# Patient Record
Sex: FEMALE | Race: WHITE
[De-identification: names, ages, dates, MRNs, and addresses within clinical notes are randomized per-mention and may not be internally consistent; named-entity substitution may affect disease eponyms.]

---

## 2017-11-15 ENCOUNTER — HOSPITAL ENCOUNTER (EMERGENCY)
Dept: HOSPITAL 92 - ERS | Age: 72
Discharge: HOME | End: 2017-11-15
Payer: MEDICARE

## 2017-11-15 DIAGNOSIS — E78.5: ICD-10-CM

## 2017-11-15 DIAGNOSIS — I10: ICD-10-CM

## 2017-11-15 DIAGNOSIS — N39.0: ICD-10-CM

## 2017-11-15 DIAGNOSIS — Z79.82: ICD-10-CM

## 2017-11-15 DIAGNOSIS — K21.9: ICD-10-CM

## 2017-11-15 DIAGNOSIS — K59.00: Primary | ICD-10-CM

## 2017-11-15 DIAGNOSIS — Z79.899: ICD-10-CM

## 2017-11-15 LAB
ALP SERPL-CCNC: 126 U/L (ref 40–150)
ALT SERPL W P-5'-P-CCNC: 10 U/L (ref 8–55)
ANION GAP SERPL CALC-SCNC: 9 MMOL/L (ref 10–20)
AST SERPL-CCNC: 14 U/L (ref 5–34)
BACTERIA UR QL AUTO: (no result) HPF
BASOPHILS # BLD AUTO: 0.1 THOU/UL (ref 0–0.2)
BASOPHILS NFR BLD AUTO: 0.7 % (ref 0–1)
BILIRUB SERPL-MCNC: 0.2 MG/DL (ref 0.2–1.2)
BUN SERPL-MCNC: 15 MG/DL (ref 9.8–20.1)
CALCIUM SERPL-MCNC: 8.6 MG/DL (ref 7.8–10.44)
CHLORIDE SERPL-SCNC: 104 MMOL/L (ref 98–107)
CO2 SERPL-SCNC: 30 MMOL/L (ref 23–31)
CREAT CL PREDICTED SERPL C-G-VRATE: 0 ML/MIN (ref 70–130)
EOSINOPHIL # BLD AUTO: 0.1 THOU/UL (ref 0–0.7)
EOSINOPHIL NFR BLD AUTO: 1 % (ref 0–10)
GLOBULIN SER CALC-MCNC: 4 G/DL (ref 2.4–3.5)
HCT VFR BLD CALC: 26.4 % (ref 36–47)
HYALINE CASTS #/AREA URNS LPF: (no result) LPF
LIPASE SERPL-CCNC: 15 U/L (ref 8–78)
LYMPHOCYTES # BLD: 2.3 THOU/UL (ref 1.2–3.4)
LYMPHOCYTES NFR BLD AUTO: 25.3 % (ref 21–51)
MICROCYTES BLD QL SMEAR: (no result) (100X)
MONOCYTES # BLD AUTO: 0.9 THOU/UL (ref 0.11–0.59)
MONOCYTES NFR BLD AUTO: 9.5 % (ref 0–10)
NEUTROPHILS # BLD AUTO: 5.7 THOU/UL (ref 1.4–6.5)
OVALOCYTES BLD QL SMEAR: (no result) (100X)
POLYCHROMASIA BLD QL SMEAR: (no result) (100X)
RBC # BLD AUTO: 3.86 MILL/UL (ref 4.2–5.4)
TROPONIN I SERPL DL<=0.01 NG/ML-MCNC: (no result) NG/ML (ref ?–0.03)
WBC # BLD AUTO: 9 THOU/UL (ref 4.8–10.8)

## 2017-11-15 PROCEDURE — 84484 ASSAY OF TROPONIN QUANT: CPT

## 2017-11-15 PROCEDURE — 87186 SC STD MICRODIL/AGAR DIL: CPT

## 2017-11-15 PROCEDURE — 87077 CULTURE AEROBIC IDENTIFY: CPT

## 2017-11-15 PROCEDURE — 74177 CT ABD & PELVIS W/CONTRAST: CPT

## 2017-11-15 PROCEDURE — 81001 URINALYSIS AUTO W/SCOPE: CPT

## 2017-11-15 PROCEDURE — 83605 ASSAY OF LACTIC ACID: CPT

## 2017-11-15 PROCEDURE — 51701 INSERT BLADDER CATHETER: CPT

## 2017-11-15 PROCEDURE — 82553 CREATINE MB FRACTION: CPT

## 2017-11-15 PROCEDURE — 85025 COMPLETE CBC W/AUTO DIFF WBC: CPT

## 2017-11-15 PROCEDURE — 87086 URINE CULTURE/COLONY COUNT: CPT

## 2017-11-15 PROCEDURE — 83690 ASSAY OF LIPASE: CPT

## 2017-11-15 PROCEDURE — 80053 COMPREHEN METABOLIC PANEL: CPT

## 2017-11-15 PROCEDURE — A4353 INTERMITTENT URINARY CATH: HCPCS

## 2017-11-15 PROCEDURE — 93005 ELECTROCARDIOGRAM TRACING: CPT

## 2017-11-15 NOTE — CT
CT ABDOMEN AND PELVIS WITH CONTRAST:

 

HISTORY: 

Abdominal pain.  Diarrhea.  History of bowel obstruction 6 months ago.

 

COMPARISON: 

CT abdomen and pelvis 4/19/17.

 

FINDINGS: 

There are some atelectatic changes in the lung bases.  No pericardial effusion.

 

Cholelithiasis is present.  There is extensive stool throughout the colon.  The rectum is enlarged me
asuring nearly 10 cm in size.  The wall is very thin with some areas that do have concern for pneumat
osis.  Moderate diverticular disease of the sigmoid colon without active inflammation.

 

IVC filter is in place.  No free intraperitoneal gas or fluid.  Hypodensity in the liver segment 4A a
nd B is similar as well as hepatic segment 2.

 

Portal vein is patent.  There is abnormal urothelial thickening of the right renal pelvis with a calc
ulus in the regional pelvis measuring less than 2 mm.  Bones are osteopenic.  Atrophy of the right gl
uteus musculature.  Multiple superior end plate deformities throughout the lumbar spine.

 

There is some ossification along the bilateral ischiofemoral space with a suggestion of ischial femor
al impingement with atrophy of the quadratus femoris muscle.  This is bilateral.

 

There is a healing posterior right 11th, 10th, rib fracture.  No acute fracture is appreciated.

 

IMPRESSION: 

1.  Very similar examination to 4/19/17.  There is severe distention of the rectal vault to approxima
tely 10 cm with some thinning of the wall and possible areas of pneumatosis, although there is no nishi
e intraperitoneal gas or fluid.  Disimpaction may be necessary.

2.  Abnormal thickening of the urothelium of the right renal pelvis with a punctate interpolar calcul
us.  Followup CT urogram may be beneficial to exclude a urothelial-based neoplasm.  Urinalysis correl
ation is also recommended.  This can be sequelae of pyelitis and chronic inflammation from calculi.

 

POS: [place]

## 2018-01-04 ENCOUNTER — HOSPITAL ENCOUNTER (INPATIENT)
Dept: HOSPITAL 92 - ERS | Age: 73
LOS: 5 days | Discharge: HOME | DRG: 394 | End: 2018-01-09
Attending: INTERNAL MEDICINE | Admitting: INTERNAL MEDICINE
Payer: MEDICARE

## 2018-01-04 VITALS — BODY MASS INDEX: 34.8 KG/M2

## 2018-01-04 DIAGNOSIS — K57.30: ICD-10-CM

## 2018-01-04 DIAGNOSIS — E78.5: ICD-10-CM

## 2018-01-04 DIAGNOSIS — Z79.82: ICD-10-CM

## 2018-01-04 DIAGNOSIS — E87.6: ICD-10-CM

## 2018-01-04 DIAGNOSIS — E66.9: ICD-10-CM

## 2018-01-04 DIAGNOSIS — Z74.01: ICD-10-CM

## 2018-01-04 DIAGNOSIS — Z87.820: ICD-10-CM

## 2018-01-04 DIAGNOSIS — I25.10: ICD-10-CM

## 2018-01-04 DIAGNOSIS — D50.9: ICD-10-CM

## 2018-01-04 DIAGNOSIS — K55.20: Primary | ICD-10-CM

## 2018-01-04 DIAGNOSIS — K56.41: ICD-10-CM

## 2018-01-04 DIAGNOSIS — N30.00: ICD-10-CM

## 2018-01-04 DIAGNOSIS — Z66: ICD-10-CM

## 2018-01-04 DIAGNOSIS — I10: ICD-10-CM

## 2018-01-04 LAB
ACANTHOCYTES BLD QL SMEAR: (no result) (100X)
ALBUMIN SERPL BCG-MCNC: 3.5 G/DL (ref 3.4–4.8)
ALP SERPL-CCNC: 120 U/L (ref 40–150)
ALT SERPL W P-5'-P-CCNC: 9 U/L (ref 8–55)
ANION GAP SERPL CALC-SCNC: 13 MMOL/L (ref 10–20)
ANISOCYTOSIS BLD QL SMEAR: (no result) (100X)
AST SERPL-CCNC: 15 U/L (ref 5–34)
BASOPHILS # BLD AUTO: 0.1 THOU/UL (ref 0–0.2)
BASOPHILS NFR BLD AUTO: 0.8 % (ref 0–1)
BILIRUB SERPL-MCNC: 0.3 MG/DL (ref 0.2–1.2)
BUN SERPL-MCNC: 11 MG/DL (ref 9.8–20.1)
CALCIUM SERPL-MCNC: 8.9 MG/DL (ref 7.8–10.44)
CHLORIDE SERPL-SCNC: 104 MMOL/L (ref 98–107)
CO2 SERPL-SCNC: 28 MMOL/L (ref 23–31)
CREAT CL PREDICTED SERPL C-G-VRATE: 0 ML/MIN (ref 70–130)
EOSINOPHIL # BLD AUTO: 0.1 THOU/UL (ref 0–0.7)
EOSINOPHIL NFR BLD AUTO: 0.8 % (ref 0–10)
GLOBULIN SER CALC-MCNC: 3.8 G/DL (ref 2.4–3.5)
GLUCOSE SERPL-MCNC: 105 MG/DL (ref 83–110)
HGB BLD-MCNC: 6.7 G/DL (ref 12–16)
LYMPHOCYTES NFR BLD AUTO: 20 % (ref 21–51)
MCH RBC QN AUTO: 18 PG (ref 27–31)
MCV RBC AUTO: 63.4 FL (ref 81–99)
MDIFF COMPLETE?: YES
MICROCYTES BLD QL SMEAR: (no result) (100X)
MONOCYTES # BLD AUTO: 0.8 THOU/UL (ref 0.11–0.59)
MONOCYTES NFR BLD AUTO: 9.2 % (ref 0–10)
NEUTROPHILS # BLD AUTO: 5.7 THOU/UL (ref 1.4–6.5)
NEUTROPHILS NFR BLD AUTO: 69.2 % (ref 42–75)
OVALOCYTES BLD QL SMEAR: (no result) (100X)
PLATELET # BLD AUTO: 429 THOU/UL (ref 130–400)
PLATELET BLD QL SMEAR: (no result)
POTASSIUM SERPL-SCNC: 3.7 MMOL/L (ref 3.5–5.1)
RBC # BLD AUTO: 3.69 MILL/UL (ref 4.2–5.4)
SODIUM SERPL-SCNC: 141 MMOL/L (ref 136–145)
WBC # BLD AUTO: 8.2 THOU/UL (ref 4.8–10.8)

## 2018-01-04 PROCEDURE — 96374 THER/PROPH/DIAG INJ IV PUSH: CPT

## 2018-01-04 PROCEDURE — 85060 BLOOD SMEAR INTERPRETATION: CPT

## 2018-01-04 PROCEDURE — 82274 ASSAY TEST FOR BLOOD FECAL: CPT

## 2018-01-04 PROCEDURE — 74177 CT ABD & PELVIS W/CONTRAST: CPT

## 2018-01-04 PROCEDURE — S0028 INJECTION, FAMOTIDINE, 20 MG: HCPCS

## 2018-01-04 PROCEDURE — 36430 TRANSFUSION BLD/BLD COMPNT: CPT

## 2018-01-04 PROCEDURE — 36415 COLL VENOUS BLD VENIPUNCTURE: CPT

## 2018-01-04 PROCEDURE — 96361 HYDRATE IV INFUSION ADD-ON: CPT

## 2018-01-04 PROCEDURE — 80053 COMPREHEN METABOLIC PANEL: CPT

## 2018-01-04 PROCEDURE — P9016 RBC LEUKOCYTES REDUCED: HCPCS

## 2018-01-04 PROCEDURE — 86850 RBC ANTIBODY SCREEN: CPT

## 2018-01-04 PROCEDURE — 86901 BLOOD TYPING SEROLOGIC RH(D): CPT

## 2018-01-04 PROCEDURE — 86900 BLOOD TYPING SEROLOGIC ABO: CPT

## 2018-01-04 PROCEDURE — 85025 COMPLETE CBC W/AUTO DIFF WBC: CPT

## 2018-01-04 PROCEDURE — 80048 BASIC METABOLIC PNL TOTAL CA: CPT

## 2018-01-04 PROCEDURE — A4216 STERILE WATER/SALINE, 10 ML: HCPCS

## 2018-01-04 PROCEDURE — 74022 RADEX COMPL AQT ABD SERIES: CPT

## 2018-01-04 NOTE — CT
EXAM:

ABDOMEN CT WITH CONTRAST

PELVIC CT WITH CONTRAST

1/4/18

 

HISTORY: 

Abdominal pain. Abdominal distention. Possible constipation. 

 

COMPARISON:  

11/15/17

 

TECHNIQUE:  

An abdomen and pelvic CT are performed with IV contrast. Enteric contrast was not administered. Coron
al reformatted images are submitted for interpretation. 

 

FINDINGS:  

 

ABDOMEN CT:

Chronic changes in the lung bases. Heart size is within normal limits. No pericardial effusion. The d
escending thoracic aorta and abdominal aorta have a normal caliber. No periaortic fat stranding. Intr
a and extrahepatic portal vein is patent. 

 

CT evidence of cholelithiasis. Findings are equivocal cholecystitis. Gallbladder ultrasound if clinic
ally warranted. Visualized common bile duct is unremarkable. 

 

Stable hypodensity in the liver, compatible with a cyst. Hepatic parenchyma is otherwise unremarkable
. Possible small cyst in the hepatic dome is also noted. 

 

Spleen, pancreas and adrenal glands have stable enhancement. Mild pancreatic atrophy.

 

Symmetric enhancement of the kidneys. Bilaterally, no obstructive uropathy. Symmetric attenuation of 
the psoas muscles.

 

Note is made of an IVC filter.  No gastrohepatic, retrocrural or periportal lymphadenopathy.

 

No mesenteric mass, lymphadenopathy, free air or free fluid. 

 

Limited evaluation of the alimentary canal due to the lack of oral contrast administration and due to
 motion degradation. Grossly, the gastric mucosa and duodenum are unremarkable. Multiple normal calib
er small bowel loops. The expected region of the ileocecal junction is unremarkable. Cecal apex appea
rs to be unremarkable for inflammatory change. The visualized appendix is unremarkable. There is mini
mal fecal material in the colon. The majority of the colon is opacified with fluid attenuation. Mild 
colonic distention. There is fluid attenuation involving the sigmoid colon and rectum. There is air i
n the nondependent portion of the distal sigmoid colon and rectum. Small amount of air is noted on th
e left lateral wall of the rectum. Significant of this air is uncertain. Possibility of pneumatosis c
annot be completely excluded given thinning of the rectal vault and mild distention. The rectal vault
 measures 10.9 x 10.0 cm. 

 

PELVIC CT:

Urinary bladder is unremarkable. No pelvic mass, lymphadenopathy, free air or free fluid. 

 

No lytic or blastic lesion in the osseous structures. 

 

IMPRESSION:  

1.      No evidence of constipation. The majority of the colon has a fluid attenuation. 

2.      There is distention of the distal sigmoid colon and rectum with thinning of the rectal wall. 
A small foci of air noted along the left lateral aspect. Possibility of pneumatosis cannot be evaluat
ion. No evidence if free air in the pelvis or abdomen. 

3.      Normal caliber appendix. 

 

 

POS: [facility]

## 2018-01-04 NOTE — RAD
EXAM:

ONE VIEW CHEST

ABDOMEN TWO VIEWS

1/4/18

 

HISTORY: 

Evaluate for bowel obstruction. Constipation and abdominal distention. 

 

COMPARISON:  

One view abdomen 4/21/17.

 

FINDINGS:  

Chest one view. 

 

Slight elongation of the aorta. Normal cardiac silhouette. Pulmonary vessels and hilum are normal. Co
stophrenic angles are clear. Linear opacities in the left lung base likely represent atelectasis. No 
consolidation or mass. No pneumothorax. No osseous abnormalities.

 

ABDOMEN TWO VIEWS: 

An abdomen radiograph demonstrates multiple nondistended air filled loops of small bowel. There is ai
r attenuation throughout the entire colon without definite colonic distention. Air attenuation is not
ed down to the level of the rectum. Minimal fecal material is present. IVC filter is redemonstrated. 


 

IMPRESSION:  

1.      No acute cardiopulmonary process.

2.      Scarring atelectasis in the left lung base. 

3.      Air attenuation throughout a nondistended, nondilated colon. No evidence of constipation. 

4.      Left lateral decubitus views are suboptimal to assess for pneumoperitoneum. An additional lef
t lateral decubitus  view has been requested. Report will be addended when that becomes available. 

 

 

 

POS: [facility]

## 2018-01-05 LAB
ACANTHOCYTES BLD QL SMEAR: (no result) (100X)
ANISOCYTOSIS BLD QL SMEAR: (no result) (100X)
BASOPHILS # BLD AUTO: 0.1 THOU/UL (ref 0–0.2)
BASOPHILS NFR BLD AUTO: 0.5 % (ref 0–1)
EOSINOPHIL # BLD AUTO: 0 THOU/UL (ref 0–0.7)
EOSINOPHIL NFR BLD AUTO: 0.4 % (ref 0–10)
HGB BLD-MCNC: 6.5 G/DL (ref 12–16)
LYMPHOCYTES # BLD: 2 THOU/UL (ref 1.2–3.4)
LYMPHOCYTES NFR BLD AUTO: 19.4 % (ref 21–51)
MCH RBC QN AUTO: 18.1 PG (ref 27–31)
MCV RBC AUTO: 63.5 FL (ref 81–99)
MDIFF COMPLETE?: YES
MONOCYTES # BLD AUTO: 1.1 THOU/UL (ref 0.11–0.59)
MONOCYTES NFR BLD AUTO: 10.2 % (ref 0–10)
NEUTROPHILS # BLD AUTO: 7.2 THOU/UL (ref 1.4–6.5)
NEUTROPHILS NFR BLD AUTO: 69.4 % (ref 42–75)
PLATELET # BLD AUTO: 435 THOU/UL (ref 130–400)
PLATELET BLD QL SMEAR: (no result)
RBC # BLD AUTO: 3.58 MILL/UL (ref 4.2–5.4)
TARGETS BLD QL SMEAR: (no result) (100X)
WBC # BLD AUTO: 10.4 THOU/UL (ref 4.8–10.8)

## 2018-01-05 RX ADMIN — FAMOTIDINE SCH MG: 10 INJECTION, SOLUTION INTRAVENOUS at 21:45

## 2018-01-05 RX ADMIN — FAMOTIDINE SCH: 10 INJECTION, SOLUTION INTRAVENOUS at 17:21
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
## 2018-01-06 LAB
ALBUMIN SERPL BCG-MCNC: 3 G/DL (ref 3.4–4.8)
ALP SERPL-CCNC: 109 U/L (ref 40–150)
ALT SERPL W P-5'-P-CCNC: 8 U/L (ref 8–55)
ANION GAP SERPL CALC-SCNC: 17 MMOL/L (ref 10–20)
AST SERPL-CCNC: 16 U/L (ref 5–34)
BASOPHILS # BLD AUTO: 0 THOU/UL (ref 0–0.2)
BASOPHILS NFR BLD AUTO: 0.4 % (ref 0–1)
BILIRUB SERPL-MCNC: 0.6 MG/DL (ref 0.2–1.2)
BUN SERPL-MCNC: 6 MG/DL (ref 9.8–20.1)
CALCIUM SERPL-MCNC: 8.3 MG/DL (ref 7.8–10.44)
CHLORIDE SERPL-SCNC: 107 MMOL/L (ref 98–107)
CO2 SERPL-SCNC: 24 MMOL/L (ref 23–31)
CREAT CL PREDICTED SERPL C-G-VRATE: 112 ML/MIN (ref 70–130)
EOSINOPHIL # BLD AUTO: 0.2 THOU/UL (ref 0–0.7)
EOSINOPHIL NFR BLD AUTO: 2.9 % (ref 0–10)
GLOBULIN SER CALC-MCNC: 3.4 G/DL (ref 2.4–3.5)
GLUCOSE SERPL-MCNC: 127 MG/DL (ref 83–110)
HGB BLD-MCNC: 7.6 G/DL (ref 12–16)
LYMPHOCYTES # BLD: 2.2 THOU/UL (ref 1.2–3.4)
LYMPHOCYTES NFR BLD AUTO: 26.9 % (ref 21–51)
MCH RBC QN AUTO: 19.8 PG (ref 27–31)
MCV RBC AUTO: 66.5 FL (ref 81–99)
MDIFF COMPLETE?: YES
MICROCYTES BLD QL SMEAR: (no result) (100X)
MONOCYTES # BLD AUTO: 1 THOU/UL (ref 0.11–0.59)
MONOCYTES NFR BLD AUTO: 11.8 % (ref 0–10)
NEUTROPHILS # BLD AUTO: 4.8 THOU/UL (ref 1.4–6.5)
NEUTROPHILS NFR BLD AUTO: 58.1 % (ref 42–75)
PLATELET # BLD AUTO: 415 THOU/UL (ref 130–400)
PLATELET BLD QL SMEAR: (no result)
POTASSIUM SERPL-SCNC: 3.6 MMOL/L (ref 3.5–5.1)
RBC # BLD AUTO: 3.83 MILL/UL (ref 4.2–5.4)
SODIUM SERPL-SCNC: 144 MMOL/L (ref 136–145)
WBC # BLD AUTO: 8.3 THOU/UL (ref 4.8–10.8)

## 2018-01-06 RX ADMIN — FAMOTIDINE SCH: 10 INJECTION, SOLUTION INTRAVENOUS at 08:20

## 2018-01-06 RX ADMIN — FAMOTIDINE SCH: 10 INJECTION, SOLUTION INTRAVENOUS at 20:11

## 2018-01-06 NOTE — OP
PREOPERATIVE DIAGNOSES:

1.  Iron deficiency anemia.

2.  Chronic constipation.

3.  Abnormal CT scan.

 

PROCEDURE IN DETAIL:  After informed consent was obtained, the patient was placed in the left lateral
 decubitus position.  Anesthesia was administered per the Anesthesia Department.  Forward-viewing end
oscope was inserted into the esophagus under direct visualization with ease and passed to the second 
portion of the duodenum with ease.  In the second portion of the duodenum, there was an AVM.  This AV
M was ablated with a 7 Setswana BICAP electrocautery probe at a setting of 20 ly 2 seconds.  Good he
mostasis was achieved.  The remainder of the duodenum was normal.  The duodenal bulb, pylorus, antrum
, body, fundus, and cardia were all normal.  Retroflexion in the stomach was normal.  Esophagus was n
ormal throughout.

 

ASSESSMENT:

1.  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

2.  Otherwise normal esophagogastroduodenoscopy.

 

RECOMMENDATIONS:

1.  Proton pump inhibitor x2 weeks.

2.  Proceed with colonoscopy.

 

DESCRIPTION OF PROCEDURE:  After informed consent was obtained, the patient was placed in the left la
teral decubitus position.  Anesthesia was administered per the Anesthesia Department.  Forward-viewin
g endoscope was inserted into the rectum after perianal inspection and rectal exam were normal.  It w
as passed to the cecum with much difficulty secondary to looping.  The cecum, ileocecal valve, and ap
pendiceal orifice were normal.  The prep was good.  In the ascending, 2 lipomas were noted.  Diffuse 
diverticula noted as well.  The remainder of the ascending, transverse, descending, sigmoid and rectu
m were normal except for some small slightly dilated colon.

 

ASSESSMENT:

1.  Diffusely dilated colon.

2.  Diffuse diverticulosis coli.

3.  Two ascending colon lipomas.

4.  Otherwise normal colonoscopy.

 

RECOMMENDATION:

1.  Dulcolax suppositories daily with digital examination.

2.  Stable from GI standpoint for discharge.

3.  Iron replacement therapy.

## 2018-01-06 NOTE — PDOC.PN
- Subjective


Encounter Start Date: 01/06/18


Encounter Start Time: 11:27





Patient seen and examined, family at bedside, all questions answered.





- Objective


Resuscitation Status: 


 











Resuscitation Status           FULL:Full Resuscitation














Vital Signs & Weight: 


 Vital Signs (12 hours)











  Temp Pulse Pulse Resp BP BP Pulse Ox


 


 01/06/18 10:58  97.4 F L  77   18   138/81  95


 


 01/06/18 08:00  97.3 F L  77   18   


 


 01/06/18 04:00  97.3 F L  77   18   176/79 H  94 L


 


 01/06/18 00:20  97.5 F L   78  18  164/77 H   93 L


 


 01/06/18 00:00  97.4 F L  93   20   144/65 H  94 L








 Weight











Weight                         215 lb 11.2 oz














I&O: 


 











 01/05/18 01/06/18 01/07/18





 06:59 06:59 06:59


 


Intake Total  4862 


 


Output Total  400 


 


Balance  4462 











Result Diagrams: 


 01/06/18 05:28





 01/06/18 04:34





Phys Exam





- Physical Examination


Constitutional: NAD


HEENT: PERRLA, moist MMs


Neck: no nodes, no JVD


Respiratory: no wheezing, no rales


Cardiovascular: RRR, no significant murmur


Gastrointestinal: soft, non-tender, no distention


Musculoskeletal: no edema, pulses present


Neurological: non-focal, normal sensation





Dx/Plan


(1) Anemia


Code(s): D64.9 - ANEMIA, UNSPECIFIED   Status: Acute   





(2) GI bleed


Code(s): K92.2 - GASTROINTESTINAL HEMORRHAGE, UNSPECIFIED   Status: Acute   





(3) Fecal impaction


Code(s): K56.41 - FECAL IMPACTION   Status: Acute   





(4) HTN (hypertension)


Code(s): I10 - ESSENTIAL (PRIMARY) HYPERTENSION   Status: Chronic   


Qualifiers: 


 





(5) Obesity (BMI 30.0-34.9)


Code(s): E66.9 - OBESITY, UNSPECIFIED   Status: Chronic   





- Plan





* Endoscopy for today


* will monitor Hgb over the next 24 hours


* DC plans in AM if Hgb stable and ok from GI perspective


* continue all other medical management for now without change


* case and plan d/w patient's family at length, they understand and agree with 

this plan

## 2018-01-07 LAB
BASOPHILS # BLD AUTO: 0 THOU/UL (ref 0–0.2)
BASOPHILS NFR BLD AUTO: 0.3 % (ref 0–1)
EOSINOPHIL # BLD AUTO: 0.2 THOU/UL (ref 0–0.7)
EOSINOPHIL NFR BLD AUTO: 3.1 % (ref 0–10)
HGB BLD-MCNC: 6.9 G/DL (ref 12–16)
LYMPHOCYTES # BLD: 2.4 THOU/UL (ref 1.2–3.4)
LYMPHOCYTES NFR BLD AUTO: 29.8 % (ref 21–51)
MCH RBC QN AUTO: 19.6 PG (ref 27–31)
MCV RBC AUTO: 67.6 FL (ref 81–99)
MONOCYTES # BLD AUTO: 0.7 THOU/UL (ref 0.11–0.59)
MONOCYTES NFR BLD AUTO: 9 % (ref 0–10)
NEUTROPHILS # BLD AUTO: 4.6 THOU/UL (ref 1.4–6.5)
NEUTROPHILS NFR BLD AUTO: 57.8 % (ref 42–75)
PLATELET # BLD AUTO: 414 THOU/UL (ref 130–400)
RBC # BLD AUTO: 3.54 MILL/UL (ref 4.2–5.4)
WBC # BLD AUTO: 8 THOU/UL (ref 4.8–10.8)

## 2018-01-07 NOTE — PRG
DATE OF SERVICE:  01/07/2018

 

SUBJECTIVE:  The patient is without complaints.  She denies any abdominal pain.  She is eating well, 
ate 100% of her breakfast.

 

OBJECTIVE:

VITAL SIGNS:  Temperature 98.6, pulse 69, respiratory rate 18, blood pressure 126/74.

CHEST:  Clear.

CARDIOVASCULAR:  Regular rate and rhythm.

ABDOMEN:  Distended, nontender, tympanitic diffusely.

 

LABORATORY DATA:  Shows hemoglobin 6.9, hematocrit 23.9, white blood cell count of 8.0.  Chemistries 
show a BUN of 6, glucose 127.  Albumin 3.0.

 

ASSESSMENT:

1.  Duodenal arteriovenous malformation.

2.  Chronic pseudo-obstruction - fairly asymptomatic at this time.

3.  Anemia.

 

RECOMMENDATIONS:

1.  Continue oral iron.

2.  Consider transfusion.

3.  Continue proton-pump inhibitor.

4.  Daily Dulcolax with digital stimulation.
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
## 2018-01-07 NOTE — PDOC.PN
- Subjective


Encounter Start Date: 01/07/18


Encounter Start Time: 12:16





Patient seen and examined, no new issues, all questions answered. No family at 

bedside. 





- Objective


Resuscitation Status: 


 











Resuscitation Status           FULL:Full Resuscitation














Vital Signs & Weight: 


 Vital Signs (12 hours)











  Temp Pulse Resp BP Pulse Ox


 


 01/07/18 08:00  98.1 F  77  20  141/87 H  94 L


 


 01/07/18 04:00  98.6 F  69  18  126/74  92 L








 Weight











Weight                         215 lb 11.2 oz














I&O: 


 











 01/06/18 01/07/18 01/08/18





 06:59 06:59 06:59


 


Intake Total 4862 240 180


 


Output Total 400  


 


Balance 4462 240 180











Result Diagrams: 


 01/07/18 04:40





 01/06/18 04:34





Phys Exam





- Physical Examination


Constitutional: NAD


HEENT: PERRLA, moist MMs


Neck: no nodes, no JVD


Respiratory: no wheezing, no rales, no rhonchi


Cardiovascular: RRR, no significant murmur


Gastrointestinal: soft, non-tender, no distention


Musculoskeletal: no edema, pulses present


Neurological: non-focal, normal sensation





Dx/Plan


(1) Anemia


Code(s): D64.9 - ANEMIA, UNSPECIFIED   Status: Acute   





(2) GI bleed


Code(s): K92.2 - GASTROINTESTINAL HEMORRHAGE, UNSPECIFIED   Status: Acute   





(3) Fecal impaction


Code(s): K56.41 - FECAL IMPACTION   Status: Acute   





(4) HTN (hypertension)


Code(s): I10 - ESSENTIAL (PRIMARY) HYPERTENSION   Status: Chronic   


Qualifiers: 


 





(5) Obesity (BMI 30.0-34.9)


Code(s): E66.9 - OBESITY, UNSPECIFIED   Status: Chronic   





- Plan





* Hgb 6.9, will transfuse 1 unit of blood


* venofer 300mg IV x 1 dose as well


* repeat Hgb in AM


* DC plans in AM if Hgb stable, will need po iron as well at time of discharge


* case and plan d/w patient at length, she understands and agrees with this plan

## 2018-01-08 LAB
ALBUMIN SERPL BCG-MCNC: 2.8 G/DL (ref 3.4–4.8)
ALP SERPL-CCNC: 81 U/L (ref 40–150)
ALT SERPL W P-5'-P-CCNC: 9 U/L (ref 8–55)
ANION GAP SERPL CALC-SCNC: 11 MMOL/L (ref 10–20)
AST SERPL-CCNC: 13 U/L (ref 5–34)
BASOPHILS # BLD AUTO: 0.1 THOU/UL (ref 0–0.2)
BASOPHILS NFR BLD AUTO: 0.8 % (ref 0–1)
BILIRUB SERPL-MCNC: 0.3 MG/DL (ref 0.2–1.2)
BUN SERPL-MCNC: 5 MG/DL (ref 9.8–20.1)
CALCIUM SERPL-MCNC: 8.1 MG/DL (ref 7.8–10.44)
CHLORIDE SERPL-SCNC: 106 MMOL/L (ref 98–107)
CO2 SERPL-SCNC: 30 MMOL/L (ref 23–31)
CREAT CL PREDICTED SERPL C-G-VRATE: 131 ML/MIN (ref 70–130)
EOSINOPHIL # BLD AUTO: 0.1 THOU/UL (ref 0–0.7)
EOSINOPHIL NFR BLD AUTO: 1.5 % (ref 0–10)
GLOBULIN SER CALC-MCNC: 2.8 G/DL (ref 2.4–3.5)
GLUCOSE SERPL-MCNC: 96 MG/DL (ref 83–110)
HGB BLD-MCNC: 6.7 G/DL (ref 12–16)
LYMPHOCYTES # BLD: 1.7 THOU/UL (ref 1.2–3.4)
LYMPHOCYTES NFR BLD AUTO: 23.8 % (ref 21–51)
MCH RBC QN AUTO: 19.6 PG (ref 27–31)
MCV RBC AUTO: 66.6 FL (ref 81–99)
MDIFF COMPLETE?: YES
MICROCYTES BLD QL SMEAR: (no result) (100X)
MONOCYTES # BLD AUTO: 0.7 THOU/UL (ref 0.11–0.59)
MONOCYTES NFR BLD AUTO: 10.1 % (ref 0–10)
NEUTROPHILS # BLD AUTO: 4.5 THOU/UL (ref 1.4–6.5)
NEUTROPHILS NFR BLD AUTO: 63.8 % (ref 42–75)
OVALOCYTES BLD QL SMEAR: (no result) (100X)
PLATELET # BLD AUTO: 397 THOU/UL (ref 130–400)
POLYCHROMASIA BLD QL SMEAR: (no result) (100X)
POTASSIUM SERPL-SCNC: 3 MMOL/L (ref 3.5–5.1)
RBC # BLD AUTO: 3.43 MILL/UL (ref 4.2–5.4)
SODIUM SERPL-SCNC: 144 MMOL/L (ref 136–145)
WBC # BLD AUTO: 7 THOU/UL (ref 4.8–10.8)

## 2018-01-08 PROCEDURE — 30233N1 TRANSFUSION OF NONAUTOLOGOUS RED BLOOD CELLS INTO PERIPHERAL VEIN, PERCUTANEOUS APPROACH: ICD-10-PCS | Performed by: INTERNAL MEDICINE

## 2018-01-08 NOTE — PRG
DATE OF SERVICE:  01/08/2018

 

SUBJECTIVE:  The patient is doing about the same.  No specific GI complaints at this time.

 

OBJECTIVE:

VITAL SIGNS:  Shows temperature 97.9, pulse 79, respiratory 16, blood pressure 112/69.

CHEST:  Clear.

CARDIOVASCULAR:  Regular rate and rhythm.

ABDOMEN:  Slightly distended, slightly tympanitic, but nontender and soft.

 

LABORATORY DATA:  Shows hemoglobin of 6.7, hematocrit 22.9.  Chemistries show potassium 3.0, albumin 
2.8.

 

ASSESSMENT:

1.  Anemia.

2.  Small bowel arteriovenous malformations.

3.  Hypokalemia.

 

RECOMMENDATIONS:

1.  Agree with transfusion.

2.  The patient has received iron infusion.

3.  Continue present treatment.

4.  We will sign off.

## 2018-01-08 NOTE — PDOC.PN
- Subjective


Encounter Start Date: 01/08/18


Encounter Start Time: 08:40


-: old records requested/rev





Patient seen and examined. No new complaints. No overnight events





- Objective


MAR Reviewed: Yes


Vital Signs & Weight: 


 Vital Signs (12 hours)











  Temp Pulse Resp BP Pulse Ox


 


 01/08/18 08:03  97.9 F  79  16  112/69  97


 


 01/08/18 07:39  98.2 F  80  18  











I&O: 


 











 01/07/18 01/08/18 01/09/18





 06:59 06:59 06:59


 


Intake Total   180


 


Balance   180











Result Diagrams: 


 01/08/18 08:41





 01/08/18 08:41





Phys Exam





- Physical Examination


Constitutional: NAD


HEENT: PERRLA, moist MMs, sclera anicteric


Neck: no JVD, supple


Respiratory: no wheezing, no rales, no rhonchi


Cardiovascular: RRR, no significant murmur, no rub


Gastrointestinal: soft, non-tender, no distention, positive bowel sounds


Musculoskeletal: no edema, pulses present


Neurological: non-focal, normal sensation


Lymphatic: no nodes


Psychiatric: normal affect


Skin: no rash, normal turgor





Dx/Plan


(1) Anemia


Code(s): D64.9 - ANEMIA, UNSPECIFIED   Status: Acute   





(2) GI bleed


Code(s): K92.2 - GASTROINTESTINAL HEMORRHAGE, UNSPECIFIED   Status: Acute   





(3) Fecal impaction


Code(s): K56.41 - FECAL IMPACTION   Status: Acute   





(4) UTI (urinary tract infection)


Status: Acute   


Qualifiers: 


   Urinary tract infection type: acute cystitis   Hematuria presence: without 

hematuria   Qualified Code(s): N30.00 - Acute cystitis without hematuria   





(5) Dyslipidemia


Code(s): E78.5 - HYPERLIPIDEMIA, UNSPECIFIED   Status: Chronic   





(6) H/O traumatic brain injury


Code(s): Z87.820 - PERSONAL HISTORY OF TRAUMATIC BRAIN INJURY   Status: Chronic

   





(7) HTN (hypertension)


Code(s): I10 - ESSENTIAL (PRIMARY) HYPERTENSION   Status: Chronic   


Qualifiers: 


 





(8) Obesity (BMI 30.0-34.9)


Code(s): E66.9 - OBESITY, UNSPECIFIED   Status: Chronic   





(9) Nausea & vomiting


Code(s): R11.2 - NAUSEA WITH VOMITING, UNSPECIFIED   Status: Resolved   


Qualifiers: 


   Vomiting type: unspecified 





(10) Hypokalemia


Code(s): E87.6 - HYPOKALEMIA   Status: Acute   





- Plan


cont current plan of care





* replace potassium


* transfuse 1 unit prbc


* medication reviewed as below


* symptomatic treatment


* repeat labs tomorrow.








Review of Systems





- Review of Systems


ENT: negative: Ear Pain, Ear Discharge, Nose Pain, Nose Discharge, Nose 

Congestion, Mouth Pain, Mouth Swelling, Throat Pain, Throat Swelling, Other


Respiratory: negative: Cough, Dry, Shortness of Breath, Hemoptysis, SOB with 

Excertion, Pleuritic Pain, Sputum, Wheezing


Cardiovascular: negative: chest pain, palpitations, orthopnea, paroxysmal 

nocturnal dyspnea, edema, light headedness, other


Gastrointestinal: negative: Nausea, Vomiting, Abdominal Pain, Diarrhea, 

Constipation, Melena, Hematochezia, Other


Genitourinary: negative: Dysuria, Frequency, Incontinence, Hematuria, Retention

, Other


Musculoskeletal: negative: Neck Pain, Shoulder Pain, Arm Pain, Back Pain, Hand 

Pain, Leg Pain, Foot Pain, Other


Skin: negative: Rash, Lesions, Everton, Bruising, Other





- Medications/Allergies


Allergies/Adverse Reactions: 


 Allergies











Allergy/AdvReac Type Severity Reaction Status Date / Time


 


No Known Drug Allergies Allergy   Verified 08/09/16 10:46











Medications: 


 Current Medications





Acetaminophen (Tylenol)  650 mg PO Q4H PRN


   PRN Reason: Headache/Fever or Pain


   Last Admin: 01/08/18 02:08 Dose:  650 mg


Artificial Tears (Tears Naturale)  0 drop EA EYE PRN PRN


   PRN Reason: Dry Eyes


Bisacodyl (Dulcolax)  10 mg [place] DAILY [facility]


   Last Admin: 01/08/18 09:00 Dose:  10 mg


Cholecalciferol (Vitamin D3)  1,000 units PO DAILY [facility]


   Last Admin: 01/08/18 09:00 Dose:  1,000 units


Famotidine (Pepcid)  20 mg PO BID [facility]


   Last Admin: 01/08/18 09:00 Dose:  20 mg


[facility] ([facility])  [facility]


Guaifenesin (Robitussin Sf)  200 mg PO Q4H PRN


   PRN Reason: Cough


Hydralazine HCl (Apresoline)  10 mg SLOW IVP Q4H PRN


   PRN Reason: Systolic BP > 180


Loratadine (Claritin)  10 mg PO DAILYPRN PRN


   PRN Reason: Sinus Symptoms


Magnesium Hydroxide (Milk Of Magnesium)  30 ml PO DAILYPRN PRN


   PRN Reason: Constipation


Mineral Oil/White Petrolatum (Eucerin Cream)  0 gm TOP BIDPRN PRN


   PRN Reason: Dry Skin


Ondansetron HCl (Zofran Odt)  4 mg PO Q6H PRN


   PRN Reason: Nausea/Vomiting


Ondansetron HCl (Zofran)  4 mg IVP Q6H PRN


   PRN Reason: Nausea/Vomiting


   Last Admin: 01/06/18 01:48 Dose:  4 mg


Polyethylene Glycol (Miralax)  17 gm PO DAILY [facility]


   Last Admin: 01/08/18 08:59 Dose:  17 gm


Simvastatin (Zocor)  10 mg PO HS [facility]


   Last Admin: 01/07/18 20:07 Dose:  10 mg


Sodium Chloride (Ocean Nasal Spray 0.65%)  0 ml EA NARE QIDPRN PRN


   PRN Reason: Nasal Congestion

## 2018-01-09 VITALS — DIASTOLIC BLOOD PRESSURE: 77 MMHG | TEMPERATURE: 98 F | SYSTOLIC BLOOD PRESSURE: 136 MMHG

## 2018-01-09 LAB
ANION GAP SERPL CALC-SCNC: 12 MMOL/L (ref 10–20)
ANISOCYTOSIS BLD QL SMEAR: (no result) (100X)
BASOPHILS # BLD AUTO: 0 THOU/UL (ref 0–0.2)
BASOPHILS NFR BLD AUTO: 0.3 % (ref 0–1)
BUN SERPL-MCNC: 7 MG/DL (ref 9.8–20.1)
CALCIUM SERPL-MCNC: 8.5 MG/DL (ref 7.8–10.44)
CHLORIDE SERPL-SCNC: 108 MMOL/L (ref 98–107)
CO2 SERPL-SCNC: 30 MMOL/L (ref 23–31)
CREAT CL PREDICTED SERPL C-G-VRATE: 127 ML/MIN (ref 70–130)
EOSINOPHIL # BLD AUTO: 0.3 THOU/UL (ref 0–0.7)
EOSINOPHIL NFR BLD AUTO: 3.6 % (ref 0–10)
GLUCOSE SERPL-MCNC: 97 MG/DL (ref 83–110)
HGB BLD-MCNC: 8.1 G/DL (ref 12–16)
LYMPHOCYTES # BLD: 2.4 THOU/UL (ref 1.2–3.4)
LYMPHOCYTES NFR BLD AUTO: 34.2 % (ref 21–51)
MCH RBC QN AUTO: 20.7 PG (ref 27–31)
MCV RBC AUTO: 70.4 FL (ref 81–99)
MDIFF COMPLETE?: YES
MICROCYTES BLD QL SMEAR: (no result) (100X)
MONOCYTES # BLD AUTO: 0.8 THOU/UL (ref 0.11–0.59)
MONOCYTES NFR BLD AUTO: 11.2 % (ref 0–10)
NEUTROPHILS # BLD AUTO: 3.6 THOU/UL (ref 1.4–6.5)
NEUTROPHILS NFR BLD AUTO: 50.7 % (ref 42–75)
OVALOCYTES BLD QL SMEAR: (no result) (100X)
PLATELET # BLD AUTO: 385 THOU/UL (ref 130–400)
PLATELET BLD QL SMEAR: (no result)
POLYCHROMASIA BLD QL SMEAR: (no result) (100X)
POTASSIUM SERPL-SCNC: 3.7 MMOL/L (ref 3.5–5.1)
RBC # BLD AUTO: 3.92 MILL/UL (ref 4.2–5.4)
SODIUM SERPL-SCNC: 146 MMOL/L (ref 136–145)
WBC # BLD AUTO: 7 THOU/UL (ref 4.8–10.8)

## 2018-01-09 PROCEDURE — 0DJD8ZZ INSPECTION OF LOWER INTESTINAL TRACT, VIA NATURAL OR ARTIFICIAL OPENING ENDOSCOPIC: ICD-10-PCS

## 2018-01-09 PROCEDURE — 0D598ZZ DESTRUCTION OF DUODENUM, VIA NATURAL OR ARTIFICIAL OPENING ENDOSCOPIC: ICD-10-PCS

## 2018-01-09 NOTE — DIS
DATE OF ADMISSION:  01/04/2018

 

DATE OF DISCHARGE:  01/09/2018

 

PRIMARY CARE PHYSICIAN:  Mekhi Bradley M.D.

 

DISCHARGE DISPOSITION:  Home.

 

PRIMARY DISCHARGE DIAGNOSES:  Symptomatic iron deficiency anemia, abdominal distention.

 

SECONDARY DISCHARGE DIAGNOSES:  Chronic constipation, hypertension, coronary artery disease, obesity,
 history of traumatic brain injury, degenerative joint disease, physical deconditioning, dyslipidemia
, and nephrolithiasis.

 

PRIMARY PROCEDURE/OPERATION:  The patient underwent upper endoscopy which showed duodenal AV malforma
tions, status post ablation.  The patient underwent colonoscopy which showed diverticulosis and ascen
ding colon lipoma.

 

SIGNIFICANT LABORATORY DATA:  Hemoglobin 8.1, creatinine 0.60.

 

RADIOLOGICAL INVESTIGATION:  Abdomen and pelvis CT scan.

 

DISCHARGE MEDICATIONS:  Aspirin 81 mg p.o. daily, Colace 100 mg p.o. b.i.d., ferrous sulfate 325 mg p
.o. b.i.d., probiotics 1 capsule p.o. b.i.d., Protonix 40 mg p.o. daily, pravastatin 20 mg p.o. at be
dtime.

 

CONTRAINDICATIONS:  None.

 

CODE STATUS:  DNR.

 

INPATIENT CONSULTANTS:  Dr. Taylor was consulted while in hospital.  Dr. Goldstein was consulted while in
 hospital.

 

TEST RESULTS PENDING ON DISCHARGE:  None.

 

ALLERGIES:  No known drug allergy.

 

DISCHARGE PLAN:  Post hospital, the patient will follow up with primary care physician.

 

HOSPITAL COURSE:  A 72-year-old female who was admitted by me.  Please see my HPI for further details
.  This patient was having abdominal distention, that abdominal distention was related with dilatatio
n of sigmoid and rectal colon.  We consulted GI and the patient underwent upper and lower endoscopic 
evaluation.  This patient also had symptomatic anemia.  During this admission, she was given blood tr
ansfusion of 2 units.  She was also given iron infusion and upon discharge, we prescribed oral iron s
upplement and her upper endoscopy showed AV malformation in the duodenum, which was ablated and colon
oscopy showed diverticulosis and colon lipomas.

 

Patient was hydrated with IV fluids while in hospital.  She was treated with stool softener and we co
ntinued all her home medication and on the day of discharge, the patient was tolerating p.o. well.  H
er hemoglobin was stable and the patient was up to her baseline level.  The patient is seen and exami
wanda at bedside today.  Please see my progress note from today for further details.

## 2018-01-09 NOTE — PDOC.PN
- Subjective


Encounter Start Date: 01/09/18


Encounter Start Time: 07:25





Patient seen and examined. No new complaints. No overnight events





- Objective


MAR Reviewed: Yes


Vital Signs & Weight: 


 Vital Signs (12 hours)











  Temp Pulse Resp BP Pulse Ox


 


 01/09/18 08:00  98 F  70  16  136/77  92 L


 


 01/09/18 07:32  98 F  70  16  136/77  92 L


 


 01/09/18 01:39      93 L











I&O: 


 











 01/08/18 01/09/18 01/10/18





 06:59 06:59 06:59


 


Intake Total  1020 


 


Output Total  4 


 


Balance  1016 











Result Diagrams: 


 01/09/18 06:00





 01/09/18 06:00





Phys Exam





- Physical Examination


Constitutional: NAD


HEENT: PERRLA, moist MMs, sclera anicteric


Neck: no JVD, supple


Respiratory: no wheezing, no rales, no rhonchi


Cardiovascular: RRR, no significant murmur, no rub


Gastrointestinal: soft, non-tender, no distention, positive bowel sounds


Musculoskeletal: no edema, pulses present


residual weakness


Lymphatic: no nodes


Psychiatric: normal affect


Skin: no rash, normal turgor





Dx/Plan


(1) Anemia


Code(s): D64.9 - ANEMIA, UNSPECIFIED   Status: Acute   





(2) GI bleed


Code(s): K92.2 - GASTROINTESTINAL HEMORRHAGE, UNSPECIFIED   Status: Acute   





(3) Fecal impaction


Code(s): K56.41 - FECAL IMPACTION   Status: Acute   





(4) UTI (urinary tract infection)


Status: Acute   


Qualifiers: 


   Urinary tract infection type: acute cystitis   Hematuria presence: without 

hematuria   Qualified Code(s): N30.00 - Acute cystitis without hematuria   





(5) Dyslipidemia


Code(s): E78.5 - HYPERLIPIDEMIA, UNSPECIFIED   Status: Chronic   





(6) H/O traumatic brain injury


Code(s): Z87.820 - PERSONAL HISTORY OF TRAUMATIC BRAIN INJURY   Status: Chronic

   





(7) HTN (hypertension)


Code(s): I10 - ESSENTIAL (PRIMARY) HYPERTENSION   Status: Chronic   


Qualifiers: 


 





(8) Obesity (BMI 30.0-34.9)


Code(s): E66.9 - OBESITY, UNSPECIFIED   Status: Chronic   





(9) Hypokalemia


Code(s): E87.6 - HYPOKALEMIA   Status: Acute   





- Plan


cont current plan of care





* medication reviewed as below


* medically stable for discharge


* see discharge summery


* outpt follow up.








Review of Systems





- Review of Systems


ENT: negative: Ear Pain, Ear Discharge, Nose Pain, Nose Discharge, Nose 

Congestion, Mouth Pain, Mouth Swelling, Throat Pain, Throat Swelling, Other


Respiratory: negative: Cough, Dry, Shortness of Breath, Hemoptysis, SOB with 

Excertion, Pleuritic Pain, Sputum, Wheezing


Cardiovascular: negative: chest pain, palpitations, orthopnea, paroxysmal 

nocturnal dyspnea, edema, light headedness, other


Gastrointestinal: negative: Nausea, Vomiting, Abdominal Pain, Diarrhea, 

Constipation, Melena, Hematochezia, Other


Genitourinary: negative: Dysuria, Frequency, Incontinence, Hematuria, Retention

, Other


Musculoskeletal: negative: Neck Pain, Shoulder Pain, Arm Pain, Back Pain, Hand 

Pain, Leg Pain, Foot Pain, Other


Skin: negative: Rash, Lesions, Everton, Bruising, Other


Other: 





not reliable due to traumatic brain injury in past





- Medications/Allergies


Allergies/Adverse Reactions: 


 Allergies











Allergy/AdvReac Type Severity Reaction Status Date / Time


 


No Known Drug Allergies Allergy   Verified 08/09/16 10:46











Medications: 


 Current Medications





Acetaminophen (Tylenol)  650 mg PO Q4H PRN


   PRN Reason: Headache/Fever or Pain


   Last Admin: 01/08/18 20:16 Dose:  650 mg


Hydrocodone Bitart/Acetaminophen (Norco 5/325)  1 tab PO Q4H PRN


   PRN Reason: Moderate Pain (4-6)


Al Hydroxide/Mg Hydroxide (Maalox)  15 ml PO Q4H PRN


   PRN Reason: Heartburn  or Indigestion


Artificial Tears (Tears Naturale)  0 drop EA EYE PRN PRN


   PRN Reason: Dry Eyes


Bisacodyl (Dulcolax)  10 mg [place] DAILY [facility]


   Last Admin: 01/09/18 07:56 Dose:  Not Given


Calcium Carbonate (Tums)  1,000 mg PO Q4H PRN


   PRN Reason: Heartburn  or Indigestion


Cholecalciferol (Vitamin D3)  1,000 units PO DAILY [facility]


   Last Admin: 01/09/18 07:55 Dose:  1,000 units


Famotidine (Pepcid)  20 mg PO BID [facility]


   Last Admin: 01/09/18 07:56 Dose:  20 mg


Ferrous Sulfate (Feosol)  325 mg PO BID[facility]


   Last Admin: 01/09/18 07:56 Dose:  325 mg


Guaifenesin (Robitussin Sf)  200 mg PO Q4H PRN


   PRN Reason: Cough


Hydralazine HCl (Apresoline)  10 mg SLOW IVP Q4H PRN


   PRN Reason: Systolic BP > 180


Loperamide HCl (Imodium)  2 mg PO PRN PRN


   PRN Reason: Diarrhea/Loose Stools


Loratadine (Claritin)  10 mg PO DAILYPRN PRN


   PRN Reason: Sinus Symptoms


Magnesium Hydroxide (Milk Of Magnesium)  30 ml PO DAILYPRN PRN


   PRN Reason: Constipation


Mineral Oil/White Petrolatum (Eucerin Cream)  0 gm TOP BIDPRN PRN


   PRN Reason: Dry Skin


Ondansetron HCl (Zofran Odt)  4 mg PO Q6H PRN


   PRN Reason: Nausea/Vomiting


Ondansetron HCl (Zofran)  4 mg IVP Q6H PRN


   PRN Reason: Nausea/Vomiting


   Last Admin: 01/06/18 01:48 Dose:  4 mg


Phenol (Chloraseptic Spray 180 Ml Bot)  0 ml PO PRN PRN


   PRN Reason: Sore Throat


Polyethylene Glycol (Miralax)  17 gm PO DAILY [facility]


   Last Admin: 01/09/18 07:56 Dose:  17 gm


Simvastatin (Zocor)  10 mg PO [facility]


   Last Admin: 01/08/18 20:15 Dose:  10 mg


Sodium Biphosphate/Sodium Phosphate (Fleet Enema)  133 ml [place] ONE PRN


   PRN Reason: Constipation


   Stop: 01/11/18 12:19


Sodium Chloride (Ocean Nasal Spray 0.65%)  0 ml EA NARE QIDPRN PRN


   PRN Reason: Nasal Congestion


Temazepam (Restoril)  15 mg PO HSPRN PRN


   PRN Reason: Insomnia

## 2018-07-15 ENCOUNTER — HOSPITAL ENCOUNTER (EMERGENCY)
Dept: HOSPITAL 92 - SCSER | Age: 73
Discharge: HOME | End: 2018-07-15
Payer: MEDICARE

## 2018-07-15 DIAGNOSIS — I25.2: ICD-10-CM

## 2018-07-15 DIAGNOSIS — Z79.82: ICD-10-CM

## 2018-07-15 DIAGNOSIS — K21.9: ICD-10-CM

## 2018-07-15 DIAGNOSIS — N30.01: Primary | ICD-10-CM

## 2018-07-15 DIAGNOSIS — B02.9: ICD-10-CM

## 2018-07-15 DIAGNOSIS — E78.5: ICD-10-CM

## 2018-07-15 DIAGNOSIS — Z79.899: ICD-10-CM

## 2018-07-15 DIAGNOSIS — G81.91: ICD-10-CM

## 2018-07-15 DIAGNOSIS — I10: ICD-10-CM

## 2018-07-15 LAB
ALBUMIN SERPL BCG-MCNC: 3.4 G/DL (ref 3.4–4.8)
ALP SERPL-CCNC: 77 U/L (ref 40–150)
ALT SERPL W P-5'-P-CCNC: 12 U/L (ref 8–55)
ANION GAP SERPL CALC-SCNC: 15 MMOL/L (ref 10–20)
AST SERPL-CCNC: 26 U/L (ref 5–34)
BACTERIA UR QL AUTO: (no result) HPF
BILIRUB SERPL-MCNC: 0.5 MG/DL (ref 0.2–1.2)
BUN SERPL-MCNC: 17 MG/DL (ref 9.8–20.1)
CALCIUM SERPL-MCNC: 8.5 MG/DL (ref 7.8–10.44)
CHLORIDE SERPL-SCNC: 110 MMOL/L (ref 98–107)
CO2 SERPL-SCNC: 23 MMOL/L (ref 23–31)
CREAT CL PREDICTED SERPL C-G-VRATE: 0 ML/MIN (ref 70–130)
GLOBULIN SER CALC-MCNC: 3.5 G/DL (ref 2.4–3.5)
GLUCOSE SERPL-MCNC: 113 MG/DL (ref 83–110)
HGB BLD-MCNC: 13.4 G/DL (ref 12–16)
HYALINE CASTS #/AREA URNS LPF: (no result) LPF
LIPASE SERPL-CCNC: 23 U/L (ref 8–78)
MCH RBC QN AUTO: 30.3 PG (ref 27–31)
MCV RBC AUTO: 87.4 FL (ref 78–98)
MDIFF COMPLETE?: YES
PLATELET # BLD AUTO: 232 THOU/UL (ref 130–400)
POTASSIUM SERPL-SCNC: 4.5 MMOL/L (ref 3.5–5.1)
RBC # BLD AUTO: 4.42 MILL/UL (ref 4.2–5.4)
RBC UR QL AUTO: (no result) HPF (ref 0–3)
SODIUM SERPL-SCNC: 143 MMOL/L (ref 136–145)
SP GR UR STRIP: 1.02 (ref 1–1.03)
WBC # BLD AUTO: 10 THOU/UL (ref 4.8–10.8)

## 2018-07-15 PROCEDURE — 87186 SC STD MICRODIL/AGAR DIL: CPT

## 2018-07-15 PROCEDURE — A4353 INTERMITTENT URINARY CATH: HCPCS

## 2018-07-15 PROCEDURE — 85025 COMPLETE CBC W/AUTO DIFF WBC: CPT

## 2018-07-15 PROCEDURE — 51701 INSERT BLADDER CATHETER: CPT

## 2018-07-15 PROCEDURE — 83690 ASSAY OF LIPASE: CPT

## 2018-07-15 PROCEDURE — 81003 URINALYSIS AUTO W/O SCOPE: CPT

## 2018-07-15 PROCEDURE — 83605 ASSAY OF LACTIC ACID: CPT

## 2018-07-15 PROCEDURE — 87086 URINE CULTURE/COLONY COUNT: CPT

## 2018-07-15 PROCEDURE — 80053 COMPREHEN METABOLIC PANEL: CPT

## 2018-07-15 PROCEDURE — 81015 MICROSCOPIC EXAM OF URINE: CPT

## 2018-07-15 PROCEDURE — 87077 CULTURE AEROBIC IDENTIFY: CPT

## 2018-07-15 PROCEDURE — 96365 THER/PROPH/DIAG IV INF INIT: CPT

## 2018-11-06 ENCOUNTER — HOSPITAL ENCOUNTER (INPATIENT)
Dept: HOSPITAL 92 - ERS | Age: 73
LOS: 3 days | Discharge: HOME HEALTH | DRG: 854 | End: 2018-11-09
Attending: FAMILY MEDICINE | Admitting: FAMILY MEDICINE
Payer: MEDICARE

## 2018-11-06 VITALS — BODY MASS INDEX: 31.9 KG/M2

## 2018-11-06 DIAGNOSIS — I10: ICD-10-CM

## 2018-11-06 DIAGNOSIS — E66.9: ICD-10-CM

## 2018-11-06 DIAGNOSIS — I25.2: ICD-10-CM

## 2018-11-06 DIAGNOSIS — A41.51: Primary | ICD-10-CM

## 2018-11-06 DIAGNOSIS — N20.2: ICD-10-CM

## 2018-11-06 DIAGNOSIS — Z87.820: ICD-10-CM

## 2018-11-06 DIAGNOSIS — Z79.899: ICD-10-CM

## 2018-11-06 DIAGNOSIS — K21.9: ICD-10-CM

## 2018-11-06 DIAGNOSIS — R41.0: ICD-10-CM

## 2018-11-06 DIAGNOSIS — Z87.442: ICD-10-CM

## 2018-11-06 DIAGNOSIS — E78.5: ICD-10-CM

## 2018-11-06 LAB
ALBUMIN SERPL BCG-MCNC: 3.5 G/DL (ref 3.4–4.8)
ALP SERPL-CCNC: 103 U/L (ref 40–150)
ALT SERPL W P-5'-P-CCNC: 7 U/L (ref 8–55)
ANION GAP SERPL CALC-SCNC: 11 MMOL/L (ref 10–20)
AST SERPL-CCNC: 9 U/L (ref 5–34)
BACTERIA UR QL AUTO: (no result) HPF
BASOPHILS # BLD AUTO: 0 THOU/UL (ref 0–0.2)
BASOPHILS NFR BLD AUTO: 0.1 % (ref 0–1)
BILIRUB SERPL-MCNC: 0.5 MG/DL (ref 0.2–1.2)
BUN SERPL-MCNC: 14 MG/DL (ref 9.8–20.1)
CALCIUM SERPL-MCNC: 8.7 MG/DL (ref 7.8–10.44)
CHLORIDE SERPL-SCNC: 107 MMOL/L (ref 98–107)
CO2 SERPL-SCNC: 26 MMOL/L (ref 23–31)
CREAT CL PREDICTED SERPL C-G-VRATE: 0 ML/MIN (ref 70–130)
CRYSTAL-AUWI FLAG: 0.3 (ref 0–15)
EOSINOPHIL # BLD AUTO: 0.1 THOU/UL (ref 0–0.7)
EOSINOPHIL NFR BLD AUTO: 0.9 % (ref 0–10)
GLOBULIN SER CALC-MCNC: 3.4 G/DL (ref 2.4–3.5)
GLUCOSE SERPL-MCNC: 114 MG/DL (ref 83–110)
HEV IGM SER QL: 0.3 (ref 0–7.99)
HGB BLD-MCNC: 13.8 G/DL (ref 12–16)
HYALINE CASTS #/AREA URNS LPF: (no result) LPF
LIPASE SERPL-CCNC: 19 U/L (ref 8–78)
LYMPHOCYTES # BLD: 2.2 THOU/UL (ref 1.2–3.4)
LYMPHOCYTES NFR BLD AUTO: 18.4 % (ref 21–51)
MCH RBC QN AUTO: 30.2 PG (ref 27–31)
MCV RBC AUTO: 95 FL (ref 78–98)
MONOCYTES # BLD AUTO: 0.8 THOU/UL (ref 0.11–0.59)
MONOCYTES NFR BLD AUTO: 6.4 % (ref 0–10)
NEUTROPHILS # BLD AUTO: 8.7 THOU/UL (ref 1.4–6.5)
NEUTROPHILS NFR BLD AUTO: 74.2 % (ref 42–75)
PATHC CAST-AUWI FLAG: 9.25 (ref 0–2.49)
PLATELET # BLD AUTO: 313 THOU/UL (ref 130–400)
POTASSIUM SERPL-SCNC: 3.5 MMOL/L (ref 3.5–5.1)
PROT UR STRIP.AUTO-MCNC: 100 MG/DL
RBC # BLD AUTO: 4.57 MILL/UL (ref 4.2–5.4)
RBC UR QL AUTO: (no result) HPF (ref 0–3)
SODIUM SERPL-SCNC: 140 MMOL/L (ref 136–145)
SP GR UR STRIP: 1.02 (ref 1–1.04)
SPERM-AUWI FLAG: 0 (ref 0–9.9)
WBC # BLD AUTO: 11.7 THOU/UL (ref 4.8–10.8)
YEAST-AUWI FLAG: 107.4 (ref 0–25)

## 2018-11-06 PROCEDURE — 87186 SC STD MICRODIL/AGAR DIL: CPT

## 2018-11-06 PROCEDURE — 93005 ELECTROCARDIOGRAM TRACING: CPT

## 2018-11-06 PROCEDURE — C1758 CATHETER, URETERAL: HCPCS

## 2018-11-06 PROCEDURE — 74177 CT ABD & PELVIS W/CONTRAST: CPT

## 2018-11-06 PROCEDURE — 51701 INSERT BLADDER CATHETER: CPT

## 2018-11-06 PROCEDURE — 74420 UROGRAPHY RTRGR +-KUB: CPT

## 2018-11-06 PROCEDURE — 83605 ASSAY OF LACTIC ACID: CPT

## 2018-11-06 PROCEDURE — 96365 THER/PROPH/DIAG IV INF INIT: CPT

## 2018-11-06 PROCEDURE — 80053 COMPREHEN METABOLIC PANEL: CPT

## 2018-11-06 PROCEDURE — 87077 CULTURE AEROBIC IDENTIFY: CPT

## 2018-11-06 PROCEDURE — G0008 ADMIN INFLUENZA VIRUS VAC: HCPCS

## 2018-11-06 PROCEDURE — A4353 INTERMITTENT URINARY CATH: HCPCS

## 2018-11-06 PROCEDURE — 96375 TX/PRO/DX INJ NEW DRUG ADDON: CPT

## 2018-11-06 PROCEDURE — 90662 IIV NO PRSV INCREASED AG IM: CPT

## 2018-11-06 PROCEDURE — 36415 COLL VENOUS BLD VENIPUNCTURE: CPT

## 2018-11-06 PROCEDURE — 81015 MICROSCOPIC EXAM OF URINE: CPT

## 2018-11-06 PROCEDURE — 87086 URINE CULTURE/COLONY COUNT: CPT

## 2018-11-06 PROCEDURE — 90471 IMMUNIZATION ADMIN: CPT

## 2018-11-06 PROCEDURE — 83690 ASSAY OF LIPASE: CPT

## 2018-11-06 PROCEDURE — 85025 COMPLETE CBC W/AUTO DIFF WBC: CPT

## 2018-11-06 PROCEDURE — 81001 URINALYSIS AUTO W/SCOPE: CPT

## 2018-11-06 PROCEDURE — 87040 BLOOD CULTURE FOR BACTERIA: CPT

## 2018-11-06 NOTE — CT
CT OF THE ABDOMEN AND PELVIS WITH IV CONTRAST:

11/6/18

 

INDICATION:

History of abdominal pain with kidney stones.

 

FINDINGS:  

There is a 1 cm stone within the proximal right ureter. There is prominent enhancement of the urothel
ium of the mid to upper right ureter extending into the right pelvis. There are numerous stones seen 
stacked within the right renal pelvis extending into an inferior calyx of the right kidney. There is 
mild right hydronephrosis. 

 

There is a prominent amount of retained stool within the rectal vault and distal sigmoid colon. The b
ladder is decompressed. No left sided ureteral or renal calculus is evident. There is an infrarenal I
VC filter. There are numerous gallstones. 

 

The adrenal glands, pancreas and spleen appear within normal limits. There is diffuse osteopenia. The
re is scattered degenerative and osteoarthritic changes. There is stable mild superior end plate comp
ression abnormalities of L4, L2, and L1. 

 

IMPRESSION:  

1.      Interval development of numerous stacked stones within the right kidney, right renal pelvis, 
with a 1 cm stone seen within the proximal right ureter. There is mild right hydronephrosis. There is
 prominent urothelial enhancement involving the mid to upper right ureter extending to the right mikki
l pelvis may be inflammatory and related to the stone disease or possibly related to superimposed inf
ection. No solid renal lesion is demonstrated. 

2.      Prominent amount of retained stool within the rectum and distal sigmoid colon. 

 

POS: FOSTER

## 2018-11-06 NOTE — PDOC.FPRHP
- History of Present Illness


Chief Complaint: Right flank pain


History of Present Illness: 





This is a 74 yo female with a pmh of GERD, HLD, HTN, MI, TBI in 2014 who 

presents to the ED with a cc of right flank pain. She states the pain has been 

going on for ~2 days. She reports having kidney stones removed in the past.. 

She states that the pain is worse with movement. Pts daughter reports a recent 

UTI. She denies nausea, vomiting, diarrhea, hematuria, or dysuria.





- Allergies/Adverse Reactions


 Allergies











Allergy/AdvReac Type Severity Reaction Status Date / Time


 


No Known Drug Allergies Allergy   Verified 08/09/16 10:46














- Home Medications


 











 Medication  Instructions  Recorded  Confirmed  Type


 


Lactobacillus Acidophilus 1 capsule PO BID 06/28/16 11/06/18 History





[Probiotic]    


 


Pravastatin Sodium 20 mg PO HS 06/28/16 11/06/18 History


 


Pantoprazole [Protonix] 40 mg PO DAILY #30 tab 04/22/17 11/06/18 Rx


 


Aspirin [Aspirin EC] 81 mg PO DAILY 01/06/18 11/06/18 History


 


Cholecalciferol [Vitamin D3] 1 tab PO DAILY 11/06/18 11/06/18 History


 


Ferrous Sulfate 325 mg PO DAILY 11/06/18 11/06/18 History


 


Sertraline HCl 50 mg PO DAILY 11/06/18 11/06/18 History














- History


PMHx:HTN, MI 20 years ago, HLD, GERD, kidney stones, TBI in 2014


 


PSHx: Kidney stone removal, hysterectomy





FHx: noncontributory


 


Social: Denies D/A/T


 








- Vital signs


BP: 135/79  HR: 105 RR: 22 Tmax: 100.1 Pox: 94% on RA  Wt: 113.4kg   








FMR H&P: Results





- Labs


Result Diagrams: 


 11/06/18 17:37





 11/06/18 17:59


Lab results: 


 











WBC  11.7 thou/uL (4.8-10.8)  H  11/06/18  17:37    


 


Hgb  13.8 g/dL (12.0-16.0)   11/06/18  17:37    


 


Hct  43.4 % (36.0-47.0)   11/06/18  17:37    


 


MCV  95.0 fL (78.0-98.0)   11/06/18  17:37    


 


Plt Count  313 thou/uL (130-400)   11/06/18  17:37    


 


Neutrophils %  74.2 % (42.0-75.0)   11/06/18  17:37    


 


Sodium  140 mmol/L (136-145)   11/06/18  17:59    


 


Potassium  3.5 mmol/L (3.5-5.1)   11/06/18  17:59    


 


Chloride  107 mmol/L ()   11/06/18  17:59    


 


Carbon Dioxide  26 mmol/L (23-31)   11/06/18  17:59    


 


BUN  14 mg/dL (9.8-20.1)   11/06/18  17:59    


 


Creatinine  0.78 mg/dL (0.6-1.1)   11/06/18  17:59    


 


Glucose  114 mg/dL ()  H  11/06/18  17:59    


 


Lactic Acid  2.1 mmol/L (0.5-2.2)   11/06/18  19:35    


 


Calcium  8.7 mg/dL (7.8-10.44)   11/06/18  17:59    


 


Total Bilirubin  0.5 mg/dL (0.2-1.2)   11/06/18  17:59    


 


AST  9 U/L (5-34)   11/06/18  17:59    


 


ALT  7 U/L (8-55)  L  11/06/18  17:59    


 


Alkaline Phosphatase  103 U/L ()   11/06/18  17:59    


 


Serum Total Protein  6.9 g/dL (6.0-8.3)   11/06/18  17:59    


 


Albumin  3.5 g/dL (3.4-4.8)   11/06/18  17:59    


 


Lipase  19 U/L (8-78)   11/06/18  17:59    


 


Urine Ketones  Trace mg/dL (Negative)  H  11/06/18  17:41    


 


Urine Blood  Large  (Negative)  H  11/06/18  17:41    


 


Urine Nitrite  Positive  (Negative)  H  11/06/18  17:41    


 


Ur Leukocyte Esterase  Large  (Negative)  H  11/06/18  17:41    


 


Urine RBC  GREATER THAN 50-TNTC HPF (0-3)  H  11/06/18  17:41    


 


Urine WBC  Greater Than 50-TNTC HPF (0-3)  H  11/06/18  17:41    


 


Ur Squamous Epith Cells  11-20 HPF (0-3)  H  11/06/18  17:41    


 


Urine Bacteria  4+ HPF (None Seen)  H  11/06/18  17:41    














FMR H&P: A/P





- Problem List


(1) Nephrolithiasis


Current Visit: Yes   Status: Acute   





(2) Sepsis


Current Visit: Yes   Status: Acute   Code(s): A41.9 - SEPSIS, UNSPECIFIED 

ORGANISM   





(3) UTI (urinary tract infection)


Current Visit: No   Status: Acute   


Qualifiers: 


 





(4) Dyslipidemia


Current Visit: No   Status: Chronic   Code(s): E78.5 - HYPERLIPIDEMIA, 

UNSPECIFIED   





(5) H/O traumatic brain injury


Current Visit: No   Status: Chronic   Code(s): Z87.820 - PERSONAL HISTORY OF 

TRAUMATIC BRAIN INJURY   





(6) HTN (hypertension)


Current Visit: No   Status: Chronic   Code(s): I10 - ESSENTIAL (PRIMARY) 

HYPERTENSION   


Qualifiers: 


 





(7) Obesity (BMI 30.0-34.9)


Current Visit: No   Status: Chronic   Code(s): E66.9 - OBESITY, UNSPECIFIED   





- Plan





This is a 74 yo female with a pmh of GERD, HLD, HTN, MI, TBI in 2014 





Sepsis secondary to UTI 


-Admit to medical. Pt has 10 mm stone in her right ureter. She meets sepsis 

criteria by pulse of 108 and respirations of 23 Pt has positive UA and slight 

WBC of 11.7. Urology has been consulted and plans to place a stent in the 

morning. Pt. is receiving levoquin and rocephin for abx coverage.. We are 

giving LR at 150 and using morphine PRN for pain management





HTN 


-Continue home medications 





HLD 


-Continue home medications 





GERD 


-Continue home medications 








Code: Full


Prophylaxis: SCDs 


Family: daughter at bedside


Disposition: home in 2-3 days 





FMR H&P: Upper Level





- Pertinent history





74 yo female here for right flank pain. Patient has history of memory loss and 

family is not at bedside to provide history, but she reports right sided flank 

pain, not sure how long and it is not present right now. Records show history 

of HTN, TBI, DJD, dyslipidemia, nephrolithiasis, CAD, chronic constipation, 

chronic anemia. 





In the ER patient was given, morphine 2mg, zofran 4mg, rocephin 1gm, levaquin 

500mg





- Pertinent findings





135/79


HR: 105


Temp: 100.1


RR: 22


O2sat: 94% on RA





CTabd/pelvis: 1cm stone in right proximal ureter; mild right hydronephrosis; 

prominent urothelial enhancement involving mid to upper R ureter, inflammatory 

vs infectious





Lactate: 2.1


BUN: 14


Cr: 0.78


UA: blood large, RBC TNTC, WBC TNTC, bacteria: 4+, squam epithel: 11-20; leuk 

large; nitrates positive





WBC: 11.7





GEN: NAD, poor historian


CARD: RRR


PULM: CTAB


ABD: BSx4, soft, nontender, right CVA tenderness


MS: right arm contracture at elbow and wrist





- Plan


Date/Time: 11/06/18 2202








IEver DO, have evaluated this patient and agree with findings/plan as 

outlined by intern resident. Pertinent changes/additions are listed here.





1. sepsis: patient is hemodynamically stable at this time, has been started on 

abx, blood and urine cultures pending; lactate 2.1 with repeat coming back


2. ureteral stone: urology was consulted from the ED, plan is to place stent in 

the AM, admit, keep NPO, morphine for pain


3. pyelonephritis: per urology recs, patient was started on rocephin and 

levaquin in the ER, will continue with these and monitor


4. HTN: will continue to monitor and restart home meds; lowest bp in the ER was 

121/61


5. hx of anemia: normal range today; continue with medications


6. hx of TBI: right arm contracture, appears to be stable per patient

## 2018-11-07 LAB
ALBUMIN SERPL BCG-MCNC: 3 G/DL (ref 3.4–4.8)
ALP SERPL-CCNC: 80 U/L (ref 40–150)
ALT SERPL W P-5'-P-CCNC: (no result) U/L (ref 8–55)
ANION GAP SERPL CALC-SCNC: 10 MMOL/L (ref 10–20)
AST SERPL-CCNC: 13 U/L (ref 5–34)
BASOPHILS # BLD AUTO: 0 THOU/UL (ref 0–0.2)
BASOPHILS NFR BLD AUTO: 0.3 % (ref 0–1)
BILIRUB SERPL-MCNC: 0.5 MG/DL (ref 0.2–1.2)
BUN SERPL-MCNC: 12 MG/DL (ref 9.8–20.1)
CALCIUM SERPL-MCNC: 8.2 MG/DL (ref 7.8–10.44)
CHLORIDE SERPL-SCNC: 109 MMOL/L (ref 98–107)
CO2 SERPL-SCNC: 24 MMOL/L (ref 23–31)
CREAT CL PREDICTED SERPL C-G-VRATE: 103 ML/MIN (ref 70–130)
EOSINOPHIL # BLD AUTO: 0.1 THOU/UL (ref 0–0.7)
EOSINOPHIL NFR BLD AUTO: 0.6 % (ref 0–10)
GLOBULIN SER CALC-MCNC: 3 G/DL (ref 2.4–3.5)
GLUCOSE SERPL-MCNC: 95 MG/DL (ref 83–110)
HGB BLD-MCNC: 12.1 G/DL (ref 12–16)
LYMPHOCYTES # BLD: 1.6 THOU/UL (ref 1.2–3.4)
LYMPHOCYTES NFR BLD AUTO: 17.3 % (ref 21–51)
MCH RBC QN AUTO: 31.2 PG (ref 27–31)
MCV RBC AUTO: 95.5 FL (ref 78–98)
MONOCYTES # BLD AUTO: 0.9 THOU/UL (ref 0.11–0.59)
MONOCYTES NFR BLD AUTO: 9.6 % (ref 0–10)
NEUTROPHILS # BLD AUTO: 6.8 THOU/UL (ref 1.4–6.5)
NEUTROPHILS NFR BLD AUTO: 72.2 % (ref 42–75)
PLATELET # BLD AUTO: 281 THOU/UL (ref 130–400)
POTASSIUM SERPL-SCNC: 3.8 MMOL/L (ref 3.5–5.1)
RBC # BLD AUTO: 3.86 MILL/UL (ref 4.2–5.4)
SODIUM SERPL-SCNC: 139 MMOL/L (ref 136–145)
WBC # BLD AUTO: 9.4 THOU/UL (ref 4.8–10.8)

## 2018-11-07 PROCEDURE — 0T768DZ DILATION OF RIGHT URETER WITH INTRALUMINAL DEVICE, VIA NATURAL OR ARTIFICIAL OPENING ENDOSCOPIC: ICD-10-PCS | Performed by: UROLOGY

## 2018-11-07 RX ADMIN — Medication SCH: at 08:33

## 2018-11-07 RX ADMIN — Medication SCH: at 20:15

## 2018-11-07 RX ADMIN — CEFTRIAXONE SCH MLS: 2 INJECTION, POWDER, FOR SOLUTION INTRAMUSCULAR; INTRAVENOUS at 20:13

## 2018-11-07 NOTE — PDOC.FM
- Subjective


Subjective: 


Pt reports feeling well today. She reports good rest and excellent pain 

control. Denies back pain/dysuria whatsoever. Reports feeling ready for surgery.





No fevers/chills, no cp, no palpitations, no dysuria, no nausea no vomiting








- Objective


MAR Reviewed: Yes


Vital Signs & Weight: 


 Vital Signs (12 hours)











  Temp Pulse Resp BP Pulse Ox


 


 11/07/18 03:23  97.3 F L  74  16  102/62  92 L


 


 11/06/18 23:23  98.8 F  80  18  103/54 L  95


 


 11/06/18 22:30      96


 


 11/06/18 22:00  98.6 F  99  16  116/79  96








 Weight











Weight                         89.9 kg














Result Diagrams: 


 11/07/18 05:40





 11/07/18 04:16





<Vinicio Mitchell - Last Filed: 11/07/18 08:25>





- Objective


Vital Signs & Weight: 


 Vital Signs (12 hours)











  Temp Pulse Resp BP Pulse Ox


 


 11/07/18 08:00   72    96


 


 11/07/18 07:00  98.6 F  68  18  107/64  91 L


 


 11/07/18 03:23  97.3 F L  74  16  102/62  92 L








 Weight











Weight                         89.9 kg














Result Diagrams: 


 11/07/18 05:40





 11/07/18 04:16





<Salvador Mccord - Last Filed: 11/07/18 14:30>





Phys Exam





- Physical Examination


Constitutional: NAD


HEENT: moist MMs, sclera anicteric


Neck: no JVD, supple


Respiratory: no wheezing, clear to auscultation bilateral


Cardiovascular: RRR, no significant murmur


Gastrointestinal: soft, non-tender


Musculoskeletal: no edema, pulses present


Neurological: normal sensation, moves all 4 limbs


Psychiatric: normal affect


Deviation from normal: A&O to person and place


Skin: no rash, normal turgor





<Vinicio Mitchell - Last Filed: 11/07/18 08:25>





Dx/Plan


(1) Nephrolithiasis


Status: Acute   





(2) Sepsis


Code(s): A41.9 - SEPSIS, UNSPECIFIED ORGANISM   Status: Acute   





(3) H/O traumatic brain injury


Code(s): Z87.820 - PERSONAL HISTORY OF TRAUMATIC BRAIN INJURY   Status: Chronic

   





(4) HTN (hypertension)


Code(s): I10 - ESSENTIAL (PRIMARY) HYPERTENSION   Status: Chronic   





- Plan


Plan: 


This is a 72 yo female with a pmh of GERD, HLD, HTN, MI, TBI in 2014 





Sepsis 2/2 pyelonephritis and uretal stone


A- Vitals now WNL, met sepsis criteria by pulse of 108 and respirations of 23 

Pt has positive UA on admission.


P- levoquin and rocephin per urology recs


- LR at 150 


- morphine PRN


- stent placement this AM


- await BCx and UCx





HTN 


A- BP on low side of normal limits


P- will hold home meds for now





HLD 


- home medications 





GERD 


- home medications 





Hx of anemia 


- h/h stable and wnl





Hx of TBI 


- MD aware





Delirium


A- pt oriented to person and place this AM upon waking up. Pt has known UTI.


P- frequent re-orientation, family at bedside recommended. Open window shades 

every morning.





Code: Full


Prophylaxis: SCDs 


Disposition: home in 2-3 days 








<Vinicio Mitchell - Last Filed: 11/07/18 08:25>





Attending Addendum





- Attending Addendum


Date/Time: 11/07/18 6435





I personally evaluated the patient and discussed the management with Dr. Dias 

and Paula.


I agree with and repeated the History, Examination, Assessment and Plan 

documented above with any addition or exceptions noted below.





+R CVAT.








<Salvador Mccord - Last Filed: 11/07/18 14:30>

## 2018-11-07 NOTE — CON
DATE OF CONSULTATION:  11/07/2018

 

This is a 73-year-old white female I have taken care of both here and at [facility] wit
h a history of recurrent renal stones and sepsis.  She is coming in now with a partial obstructing ri
ght proximal ureteral stone, she has got number of renal stones on the right also.  She had pyuria an
d bacteriuria.  Her vital signs are currently stable.  She is on the floor, she started to receive an
tibiotics.  Cultures have been done.  A CAT scan showed the above findings.  Because of the infection
, I think she needs a stent.  We will plan on passing a stent today and then treating the stones once
 her infection is cleared perhaps next week.  She has a very complicated medical history.  She has a 
history of brain injury.  She has a history of hypertension, myocardial infarction, reflux disease an
d recurrent stones.  She has had a hysterectomy also treated for stones with shockwave and ureterosco
py in the past.  I think that was done both here and I think some of it may have been done also at [facility].  She has been n.p.o. since midnight.  She is on antibiotics.  I have posted for the OR
 for late this morning.  We will get this procedure done.

## 2018-11-07 NOTE — RAD
RETROGRADE RIGHT UROGRAM:

11/7/18

 

HISTORY: 

Stone removal and right ureteral stent placement.

 

COMPARISON:  

CT abdomen on 11/6/18 as well as prior retrograde study on 8/10/16.

 

FINDINGS/IMPRESSION:  

 image demonstrates IVC filter in place. There is motion on the  image which limits evaluat
ion and the renal shadows are mostly obscured. Subsequent imaging demonstrates ureteral catheter in p
lace with guide wire in position in the right ureter and overlying right renal collecting system. Rig
ht retrograde urogram does demonstrate mild right hydronephrosis. There is a filling defect seen in t
he region of the right renal pelvis which may be related to residual calculus or possibly debris or h
emorrhage. Final image demonstrates right ureteral stent in place. There is a large amount of retaine
d fecal material in the region of the rectum. Correlation with intraoperative findings is recommended
. 

 

 

 

 

 

POS: FOSTER

## 2018-11-07 NOTE — OP
DATE OF PROCEDURE:  11/06/2018

 

PREOPERATIVE DIAGNOSIS:  Right ureteral stone with urinary tract infection.

 

POSTOPERATIVE DIAGNOSES:  Right ureteral stone with urinary tract infection.

 

PROCEDURES PERFORMED:  Cystoscopy, right retrograde and right stent placement.

 

SURGEON:  Dileep Vegas M.D.

 

ANESTHESIA:  General.

 

ESTIMATED BLOOD LOSS:  Minimal.

 

DRAINS PLACED:  A 6 x 24 Polaris double-J stent without a string attached.

 

FINDINGS:  A proximal right ureteral stone and filling defects in the right renal pelvis.

 

OPERATIVE TECHNIQUE:  After obtaining written and verbal consent from the patient, she was taken to [facility] operating suite.  She was placed in the supine position on treatment table.  PlexiPulses were plac
ed on lower extremities and turned on.  She was given a general anesthetic and oral obturator intubat
ion.  She was placed in the dorsal lithotomy position, sterilely prepped and draped.  Cystoscopy was 
performed with a 22-Peruvian sheath.  This was well lubricated and passed under direct vision through [facility] female urethra and into the urinary bladder with aid of 30-degree lens, a video camera and monitor
.  There was some cloudiness to the urine as her bladder was filled and emptied a number of times unt
il it was cleared.  Fluoroscopy unit was used and a  KUB was taken with it.  We located the [facility]
t ureteral orifice and fed a 5-Peruvian Pollack catheter into this and a guidewire up into the region o
f the renal pelvis, advancing the open-ended catheter up to this region and removed then a guidewire 
injecting about 5 mL of contrast to fill out the proximal collecting system and ureter.  She then had
 the guidewire replaced.  The open-ended catheter removed then a stent placed over the guidewire and 
pushed up in place with aid of a pusher so its proximal end coiled in the renal pelvis and its distal
 end coiled in the bladder when the wire was removed.  The bladder straining instruments were removed
.  She was awakened, extubated and taken out of dorsal lithotomy position and taken by stretcher to [facility] recovery room.

## 2018-11-08 RX ADMIN — CEFTRIAXONE SCH MLS: 2 INJECTION, POWDER, FOR SOLUTION INTRAMUSCULAR; INTRAVENOUS at 20:08

## 2018-11-08 RX ADMIN — Medication SCH ML: at 08:50

## 2018-11-08 RX ADMIN — Medication SCH: at 21:07

## 2018-11-08 NOTE — PDOC.FM
- Subjective


Subjective: 


Pt reports good rest overnight and is feeling well. Reports good pain controll 

and has no complaints at this time.





No fever/chills, no sob no cough, no dysuria, no cp








- Objective


MAR Reviewed: Yes


Vital Signs & Weight: 


 Vital Signs (12 hours)











  Temp Pulse Resp BP Pulse Ox


 


 11/08/18 04:00  98.5 F  74  18  113/80  92 L


 


 11/08/18 00:00  98.5 F  69  18  142/67 H  92 L


 


 11/07/18 20:10      97


 


 11/07/18 20:00  99.4 F  64  16  134/76  97


 


 11/07/18 18:45   80  18  134/63  95


 


 11/07/18 18:15   68  18  130/75  96








 Weight











Weight                         89.9 kg














I&O: 


 











 11/06/18 11/07/18 11/08/18





 06:59 06:59 06:59


 


Intake Total   1500


 


Balance   1500











Result Diagrams: 


 11/07/18 05:40





 11/07/18 04:16





<Vinicio Mitchell - Last Filed: 11/08/18 09:06>





- Objective


Vital Signs & Weight: 


 Vital Signs (12 hours)











  Temp Pulse Resp BP Pulse Ox


 


 11/08/18 11:03  98.5 F  97  94 H  115/51 L  18 L


 


 11/08/18 09:00      95


 


 11/08/18 08:21  97.5 F L  72  20  141/77 H  95


 


 11/08/18 04:00  98.5 F  74  18  113/80  92 L








 Weight











Weight                         89.9 kg














I&O: 


 











 11/07/18 11/08/18 11/09/18





 06:59 06:59 06:59


 


Intake Total  3350 


 


Balance  3350 











Result Diagrams: 


 11/07/18 05:40





 11/07/18 04:16





<Salvador Mccord - Last Filed: 11/08/18 12:52>





Phys Exam





- Physical Examination


Constitutional: NAD


HEENT: moist MMs, sclera anicteric


Neck: supple, full ROM


Respiratory: no wheezing, no rhonchi


Cardiovascular: RRR, no significant murmur


Gastrointestinal: soft, non-tender


Musculoskeletal: no edema, pulses present


Neurological: normal sensation


does not move R arm per hx of TBI


Psychiatric: normal affect


Skin: no rash, normal turgor





<Vinicio Mitchell Filed: 11/08/18 09:06>





Dx/Plan


(1) Nephrolithiasis


Status: Acute   





(2) Sepsis


Code(s): A41.9 - SEPSIS, UNSPECIFIED ORGANISM   Status: Acute   





(3) H/O traumatic brain injury


Code(s): Z87.820 - PERSONAL HISTORY OF TRAUMATIC BRAIN INJURY   Status: Chronic

   





(4) HTN (hypertension)


Code(s): I10 - ESSENTIAL (PRIMARY) HYPERTENSION   Status: Chronic   





- Plan


Plan: 


This is a 74 yo female with a pmh of GERD, HLD, HTN, MI, TBI in 2014 





Sepsis 2/2 pyelonephritis and uretal stone


A- Vitals  WNL though pt on 2L O2 since procedure. pt is s/p stent placement.,


P- levoquin and rocephin per urology recs


- LR at 150 


- morphine PRN, will plan to wean to PO pain mgmt


- await BCx and UCx





HTN 


A- BP on low side of normal limits


P- will hold home meds for now





HLD 


- home medications 





GERD 


- home medications 





Hx of anemia 


- h/h stable and wnl





Hx of TBI 


- MD aware





Delirium


A- Pt has UTI as risk factor


P- frequent re-orientation, family at bedside recommended. Open window shades 

every morning.





Code: Full


Prophylaxis: SCDs 


Disposition: home in 2-3 days 








<Vinicio Mitchell - Last Filed: 11/08/18 09:06>





Attending Addendum





- Attending Addendum


Date/Time: 11/08/18 1252





I personally evaluated the patient and discussed the management with Dr. Mitchell.


I agree with and repeated the History, Examination, Assessment and Plan 

documented above with any addition or exceptions noted below.





Still mild R CVAT.  Await sensitivities.  Discharge pending.








<Salvador Mccord - Last Filed: 11/08/18 12:52>

## 2018-11-09 VITALS — SYSTOLIC BLOOD PRESSURE: 148 MMHG | DIASTOLIC BLOOD PRESSURE: 78 MMHG | TEMPERATURE: 98.2 F

## 2018-11-09 LAB
BASOPHILS # BLD AUTO: 0 THOU/UL (ref 0–0.2)
BASOPHILS NFR BLD AUTO: 0.5 % (ref 0–1)
EOSINOPHIL # BLD AUTO: 0.2 THOU/UL (ref 0–0.7)
EOSINOPHIL NFR BLD AUTO: 2.7 % (ref 0–10)
HGB BLD-MCNC: 12.3 G/DL (ref 12–16)
LYMPHOCYTES # BLD: 1.2 THOU/UL (ref 1.2–3.4)
LYMPHOCYTES NFR BLD AUTO: 19.6 % (ref 21–51)
MCH RBC QN AUTO: 31.3 PG (ref 27–31)
MCV RBC AUTO: 96.4 FL (ref 78–98)
MONOCYTES # BLD AUTO: 0.5 THOU/UL (ref 0.11–0.59)
MONOCYTES NFR BLD AUTO: 7.5 % (ref 0–10)
NEUTROPHILS # BLD AUTO: 4.4 THOU/UL (ref 1.4–6.5)
NEUTROPHILS NFR BLD AUTO: 69.7 % (ref 42–75)
PLATELET # BLD AUTO: 273 THOU/UL (ref 130–400)
RBC # BLD AUTO: 3.93 MILL/UL (ref 4.2–5.4)
WBC # BLD AUTO: 6.3 THOU/UL (ref 4.8–10.8)

## 2018-11-09 RX ADMIN — Medication SCH ML: at 08:23

## 2018-11-09 NOTE — PDOC.FM
- Subjective


Subjective: 


Pt reports continued good control of pain, good rest overnight. Pt reports 

feeling good about inpatient rehab.





No fever/chills, no cp no palpitations, no dysuria








- Objective


MAR Reviewed: Yes


Vital Signs & Weight: 


 Vital Signs (12 hours)











  Temp Pulse Resp BP Pulse Ox


 


 11/09/18 04:00  98.7 F  77  18  129/79  94 L


 


 11/09/18 00:00  97.9 F  85  18  116/77  93 L


 


 11/08/18 20:05      94 L


 


 11/08/18 20:00  98.7 F  81  20  117/68  94 L








 Weight











Weight                         89.9 kg














I&O: 


 











 11/07/18 11/08/18 11/09/18





 06:59 06:59 06:59


 


Intake Total  3350 3620


 


Balance  3350 3620











Result Diagrams: 


 11/07/18 05:40





 11/07/18 04:16





<Vinicio Mitchell - Last Filed: 11/09/18 08:59>





- Objective


Vital Signs & Weight: 


 Vital Signs (12 hours)











  Temp Pulse Resp BP Pulse Ox


 


 11/09/18 08:24      95


 


 11/09/18 08:19  98.0 F  67  15  138/78  95


 


 11/09/18 04:00  98.7 F  77  18  129/79  94 L








 Weight











Weight                         89.9 kg














I&O: 


 











 11/08/18 11/09/18 11/10/18





 06:59 06:59 06:59


 


Intake Total 3350 3620 


 


Balance 3350 3620 











Result Diagrams: 


 11/09/18 09:18





 11/07/18 04:16





<Salvador Mccord - Last Filed: 11/09/18 13:51>





Phys Exam





- Physical Examination


Constitutional: NAD


HEENT: moist MMs, sclera anicteric


Neck: no JVD, full ROM


Respiratory: no wheezing, no rales


Cardiovascular: RRR, no significant murmur


Gastrointestinal: soft, non-tender


Musculoskeletal: no edema, pulses present


Neurological: non-focal, normal sensation


Lymphatic: no nodes


Psychiatric: normal affect


Skin: no rash, normal turgor





<Vinicio Mitchell - Last Filed: 11/09/18 08:59>





Dx/Plan


(1) Nephrolithiasis


Status: Acute   





(2) Sepsis


Code(s): A41.9 - SEPSIS, UNSPECIFIED ORGANISM   Status: Acute   





(3) H/O traumatic brain injury


Code(s): Z87.820 - PERSONAL HISTORY OF TRAUMATIC BRAIN INJURY   Status: Chronic

   





(4) HTN (hypertension)


Code(s): I10 - ESSENTIAL (PRIMARY) HYPERTENSION   Status: Chronic   





- Plan


Plan: 


This is a 72 yo female with a pmh of GERD, HLD, HTN, MI, TBI in 2014 





Sepsis 2/2 pyelonephritis and uretal stone


A- Vitals  WNL though pt on 2L O2 since procedure. pt is s/p stent placement., 

sensitivities show sensitivity to fosfamycin


P- levoquin and rocephin per urology recs, will call urology and consider 

switching to fosfamycin


- morphine PRN, will plan to wean to PO pain mgmt





HTN 


A- BP on low side of normal limits


P- will hold home meds for now





HLD 


- home medications 





GERD 


- home medications 





Hx of anemia 


- h/h stable and wnl





Hx of TBI 


- MD aware





Delirium


A- Pt has UTI as risk factor


P- frequent re-orientation, family at bedside recommended. Open window shades 

every morning.





Code: Full


Prophylaxis: SCDs 


Disposition: DC to inpatient rehab today or tomorrow per urology recs.








<Vinicio Mitchell - Last Filed: 11/09/18 08:59>





Attending Addendum





- Attending Addendum


Date/Time: 11/09/18 1351





I personally evaluated the patient and discussed the management with Dr. Mitchell.


I agree with and repeated the History, Examination, Assessment and Plan 

documented above with any addition or exceptions noted below.





Pt doing well this AM.  Plan for d/c antiplatelets and for placement.  SWLT 

next Wednesday with Dr. Cheney.








<Salvador Mccord - Last Filed: 11/09/18 13:51>

## 2018-11-09 NOTE — PRG
DATE OF SERVICE:  11/09/2018

 

OBJECTIVE:  She has been afebrile with stable vital signs.  She feels fine.  No pain.  Her urinary cu
lture showed an E. coli with resistance to quinolones and the Proteus was resistant to quinolones, Ba
ctrim, ampicillin, and ampicillin sulbactam.  Blood cultures have been negative.

 

I can likely do shock wave lithotripsy on her next Wednesday.  She will need to be on antibiotics unt
il then and for a few days after.  The question is what antibiotic.  It is possible that maybe she co
uld go home on p.o.  Omnicef 300 mg p.o. b.i.d. and I think it looked like the cephalosporins would p
robably cover this.  Another option would be to check with Dr. Gonsales of Infectious Disease to see if 
he felt it would be safer for her to have a PICC line and be on IV antibiotics.  

 

I would very much appreciated it if she could be off of all blood thinners and aspirin products adelina diallo starting now in preparation for potential shock with lithotripsy next Wednesday.  My office will 
arrange for this procedure.

## 2018-11-11 NOTE — DIS-2
DATE OF ADMISSION:  11/06/2018

 

DATE OF DISCHARGE:  11/09/2018

 

RESIDENT:  Vinicio Mitchell.

 

ADMITTING ATTENDING:  Vinicio Bee.

 

DISCHARGE ATTENDING:  Salvador Mccord.

 

CONSULTATION:  Urology, Dr. Dileep Cheney.

 

PROCEDURES:

1.  On 11/06/2018, abdomen and pelvis CT.  Impression:  Interval development of numerous tract stones
 within right kidney.  Right renal pelvis with 1 cm stone within the proximal right ureter.  There is
 mild right hydronephrosis.  There is prominent urothelial enhancement involving the mid to upper rig
ht ureter extending to the right renal pelvis may be inflammatory and related to stone disease or pos
sibly related to superimposed infection.  No solid renal lesion is demonstrated.  Prominent amount of
 retained stool within the rectum and rectosigmoid colon.

2.  On 11/07/2018, retrograde pyelogram.  Impression:   image demonstrates IVC filter in place. 
 There is motion on  image which limits evaluation and renal shadows are mostly obscured.  Subse
quent imaging demonstrates ureteral catheter is in place with guidewire in position in the right uret
er and overlying right renal collecting system.  Right retrograde urogram does demonstrate mild right
 hydronephrosis.  There is a filling defect seen in the region of the right renal pelvis, which may b
e related to the residual calculus or possibly debris or hemorrhage.  Final image demonstrates right 
ureteral stent in place.  There is a large amount of retained fecal material in the region of the rec
rajendra.  Correlation with intraoperative findings is recommended.

3.  On 11/07/2018, cystoscopy, right retrograde and right stent placement.

 

PRIMARY DIAGNOSIS:  Sepsis secondary to pyelonephritis with right urolithiasis.

 

SECONDARY DIAGNOSES:  Hypertension, hyperlipidemia, gastroesophageal reflux disease.

 

DISCHARGE MEDICATIONS:

1.  Pravastatin sodium 20 mg p.o. at bedtime.

2.  Probiotic 1 capsule p.o. b.i.d.

3.  Pantoprazole 40 mg p.o. daily.

4.  Sertraline 50 mg p.o. daily.

5.  Vitamin D 1000 units 1 tab p.o. daily.

6.  Ferrous sulfate 325 mg p.o. daily.

7.  Flavoxate 100 mg p.o. t.i.d.

8.  Ibuprofen 600 mg p.o. q.6 hours p.r.n.

9.  Cefdinir 300 mg p.o. q.12 hours twenty capsules.

 

DISCONTINUED MEDICATION:  Aspirin 81 mg p.o. daily.

 

HISTORY OF PRESENT ILLNESS AND HOSPITAL COURSE:  This is a 73-year-old female who presented to the ED
 with chief complaint of right flank pain that had been going on for 2 days.  Patient had history of 
stones removed in the past and kidney stones were found on CT as well as urothelial thickening sugges
tive of possible infection.  The patient was tachycardic and tachypneic on presentation and had a pos
itive UA.  So the patient was admitted for sepsis secondary to pyelonephritis with evidence of right 
ureteral stone and had plans arranged for ureteral stent placement after Urology was consulted.  Luciana
ent tolerated surgery well and had postop recovery period to be uneventful.  Pyelonephritis was treat
ed with Rocephin and Levaquin per Urology recommendations until discharge when the patient was prescr
ibed cefdinir per recommendations of pharmacy by sensitivities on urine culture.  Once the patient wa
s found to be stable after surgery, patient was discharged home where the patient had home health alr
brook established and waiting for them.  Other health issues such as hypertension, hyperlipidemia, RAYMOND
D, and history of anemia were managed with home medications during hospital stay.

 

DISPOSITION:  Stable.

 

DISCHARGE INSTRUCTIONS:

1.  Location:  Home with home health.

2.  Diet:  No restrictions.

3.  Activity:  As tolerated.

4.  Follow up with PCP, Mekhi Bradley in 7 days and Urology, Dileep Cheney in 3 days.

## 2018-11-21 ENCOUNTER — HOSPITAL ENCOUNTER (OUTPATIENT)
Dept: HOSPITAL 92 - SDC | Age: 73
Discharge: HOME | End: 2018-11-21
Attending: UROLOGY
Payer: MEDICARE

## 2018-11-21 VITALS — BODY MASS INDEX: 33.9 KG/M2

## 2018-11-21 DIAGNOSIS — Z79.899: ICD-10-CM

## 2018-11-21 DIAGNOSIS — I10: ICD-10-CM

## 2018-11-21 DIAGNOSIS — N20.2: Primary | ICD-10-CM

## 2018-11-21 DIAGNOSIS — K21.9: ICD-10-CM

## 2018-11-21 LAB
ANION GAP SERPL CALC-SCNC: 11 MMOL/L (ref 10–20)
BASOPHILS # BLD AUTO: 0 THOU/UL (ref 0–0.2)
BASOPHILS NFR BLD AUTO: 0.3 % (ref 0–1)
BUN SERPL-MCNC: 12 MG/DL (ref 9.8–20.1)
CALCIUM SERPL-MCNC: 8.9 MG/DL (ref 7.8–10.44)
CHLORIDE SERPL-SCNC: 106 MMOL/L (ref 98–107)
CO2 SERPL-SCNC: 28 MMOL/L (ref 23–31)
CREAT CL PREDICTED SERPL C-G-VRATE: 106 ML/MIN (ref 70–130)
EOSINOPHIL # BLD AUTO: 0.1 THOU/UL (ref 0–0.7)
EOSINOPHIL NFR BLD AUTO: 1 % (ref 0–10)
GLUCOSE SERPL-MCNC: 146 MG/DL (ref 83–110)
HGB BLD-MCNC: 14.8 G/DL (ref 12–16)
LYMPHOCYTES # BLD: 1.1 THOU/UL (ref 1.2–3.4)
LYMPHOCYTES NFR BLD AUTO: 13.9 % (ref 21–51)
MCH RBC QN AUTO: 30.9 PG (ref 27–31)
MCV RBC AUTO: 96.7 FL (ref 78–98)
MONOCYTES # BLD AUTO: 0.4 THOU/UL (ref 0.11–0.59)
MONOCYTES NFR BLD AUTO: 4.9 % (ref 0–10)
NEUTROPHILS # BLD AUTO: 6.6 THOU/UL (ref 1.4–6.5)
NEUTROPHILS NFR BLD AUTO: 79.9 % (ref 42–75)
PLATELET # BLD AUTO: 268 THOU/UL (ref 130–400)
PLATELET # BLD: 284 THOU/UL (ref 130–400)
POTASSIUM SERPL-SCNC: 3.7 MMOL/L (ref 3.5–5.1)
RBC # BLD AUTO: 4.77 MILL/UL (ref 4.2–5.4)
SODIUM SERPL-SCNC: 141 MMOL/L (ref 136–145)
WBC # BLD AUTO: 8.2 THOU/UL (ref 4.8–10.8)

## 2018-11-21 PROCEDURE — 0TF6XZZ FRAGMENTATION IN RIGHT URETER, EXTERNAL APPROACH: ICD-10-PCS | Performed by: UROLOGY

## 2018-11-21 PROCEDURE — 0TF3XZZ FRAGMENTATION IN RIGHT KIDNEY PELVIS, EXTERNAL APPROACH: ICD-10-PCS | Performed by: UROLOGY

## 2018-11-21 PROCEDURE — 80048 BASIC METABOLIC PNL TOTAL CA: CPT

## 2018-11-21 PROCEDURE — 85025 COMPLETE CBC W/AUTO DIFF WBC: CPT

## 2018-11-21 PROCEDURE — 85576 BLOOD PLATELET AGGREGATION: CPT

## 2018-11-21 NOTE — OP
DATE OF PROCEDURE:  11/21/2018

 

PREOPERATIVE DIAGNOSIS:  Right renal upper ureteral stones.

 

POSTOPERATIVE DIAGNOSIS:  Right renal upper ureteral stones.

 

PROCEDURE PERFORMED:  Right ESWL.

 

SURGEON:  Dr. Dileep Vegas.

 

ANESTHETIC:  General.

 

ESTIMATED BLOOD LOSS:  Not recorded.

 

FINDINGS:  There are a number of stones in the proximal right ureter extending up into the renal pelv
is, the lowest 2 of these were treated with a total of 2500 shocks at maximum KV level of 4.  There a
ppeared to be soft stones and broken up well.  The stent was already indwelling.  We did not replace 
it.  We will probably look at replacing it on next treatment as she will require more than 1 treatmen
t for sure.

 

OPERATIVE TECHNIQUE:  After obtaining written and verbal consent from the patient, documenting normal
 preoperative blood work and receiving IV antibiotics, she was taken to the operating suite.  She was
 placed in the supine position on the treatment table.  PlexiPulses were placed on her lower extremit
ies and turned on.  She was given a general anesthetic and oral obturator intubation.  She was couple
d to the lithotripsy unit and the stone was placed in treatment focal point.  Shockwave therapy was c
ommenced and started a low kV and brought to level 4.  We did not have to go by level 4 as it appeare
d that it was breaking up the stones.  Fluoroscopy was used intermittently to document stone fragment
ation and reposition as necessary.  After 2500 shocks, the procedure was terminated.  She was awakene
d, extubated, and taken by stretcher to the recovery room.

## 2018-12-12 ENCOUNTER — HOSPITAL ENCOUNTER (OUTPATIENT)
Dept: HOSPITAL 92 - SDC | Age: 73
Discharge: HOME | End: 2018-12-12
Attending: UROLOGY
Payer: MEDICARE

## 2018-12-12 VITALS — BODY MASS INDEX: 33.9 KG/M2

## 2018-12-12 DIAGNOSIS — N20.0: Primary | ICD-10-CM

## 2018-12-12 DIAGNOSIS — Z79.899: ICD-10-CM

## 2018-12-12 LAB
ANION GAP SERPL CALC-SCNC: 14 MMOL/L (ref 10–20)
BASOPHILS # BLD AUTO: 0.1 THOU/UL (ref 0–0.2)
BASOPHILS NFR BLD AUTO: 0.6 % (ref 0–1)
BUN SERPL-MCNC: 16 MG/DL (ref 9.8–20.1)
CALCIUM SERPL-MCNC: 9.3 MG/DL (ref 7.8–10.44)
CHLORIDE SERPL-SCNC: 107 MMOL/L (ref 98–107)
CO2 SERPL-SCNC: 23 MMOL/L (ref 23–31)
CREAT CL PREDICTED SERPL C-G-VRATE: 114 ML/MIN (ref 70–130)
EOSINOPHIL # BLD AUTO: 0.1 THOU/UL (ref 0–0.7)
EOSINOPHIL NFR BLD AUTO: 1 % (ref 0–10)
GLUCOSE SERPL-MCNC: 119 MG/DL (ref 83–110)
HGB BLD-MCNC: 14.7 G/DL (ref 12–16)
LYMPHOCYTES # BLD: 1.7 THOU/UL (ref 1.2–3.4)
LYMPHOCYTES NFR BLD AUTO: 17.8 % (ref 21–51)
MCH RBC QN AUTO: 30.6 PG (ref 27–31)
MCV RBC AUTO: 95.3 FL (ref 78–98)
MONOCYTES # BLD AUTO: 0.7 THOU/UL (ref 0.11–0.59)
MONOCYTES NFR BLD AUTO: 7.2 % (ref 0–10)
NEUTROPHILS # BLD AUTO: 7.2 THOU/UL (ref 1.4–6.5)
NEUTROPHILS NFR BLD AUTO: 73.5 % (ref 42–75)
PLATELET # BLD AUTO: 292 THOU/UL (ref 130–400)
POTASSIUM SERPL-SCNC: 4.3 MMOL/L (ref 3.5–5.1)
RBC # BLD AUTO: 4.82 MILL/UL (ref 4.2–5.4)
SODIUM SERPL-SCNC: 140 MMOL/L (ref 136–145)
WBC # BLD AUTO: 9.8 THOU/UL (ref 4.8–10.8)

## 2018-12-12 PROCEDURE — 0TP98DZ REMOVAL OF INTRALUMINAL DEVICE FROM URETER, VIA NATURAL OR ARTIFICIAL OPENING ENDOSCOPIC: ICD-10-PCS | Performed by: UROLOGY

## 2018-12-12 PROCEDURE — C1758 CATHETER, URETERAL: HCPCS

## 2018-12-12 PROCEDURE — 0TF3XZZ FRAGMENTATION IN RIGHT KIDNEY PELVIS, EXTERNAL APPROACH: ICD-10-PCS | Performed by: UROLOGY

## 2018-12-12 PROCEDURE — 80048 BASIC METABOLIC PNL TOTAL CA: CPT

## 2018-12-12 PROCEDURE — 50590 FRAGMENTING OF KIDNEY STONE: CPT

## 2018-12-12 PROCEDURE — 0T768DZ DILATION OF RIGHT URETER WITH INTRALUMINAL DEVICE, VIA NATURAL OR ARTIFICIAL OPENING ENDOSCOPIC: ICD-10-PCS | Performed by: UROLOGY

## 2018-12-12 PROCEDURE — 52332 CYSTOSCOPY AND TREATMENT: CPT

## 2018-12-12 PROCEDURE — 36415 COLL VENOUS BLD VENIPUNCTURE: CPT

## 2018-12-12 PROCEDURE — 85025 COMPLETE CBC W/AUTO DIFF WBC: CPT

## 2018-12-12 NOTE — OP
DATE OF PROCEDURE:  12/12/2018



PREOPERATIVE DIAGNOSIS:  Right double J-stent, right renal stones.



POSTOPERATIVE DIAGNOSIS:  Right double J-stent, right renal stones.



PROCEDURES PERFORMED:  Cystoscopy, exchange of right stent and right ESWL.



ANESTHESIA:  General.



ESTIMATED BLOOD LOSS:  Not recorded.



FINDINGS:  She has what looked to be good fragmentation of the prior ureteral

stones.  She actually wanted to me take the stent out as some small fragments that

came out adjacent to it.  I did not see any obvious stone in the ureter.  She still

got some stones in the right pelvis, we treated 2 of those with the maximum KUB of

4, we treated with 2500 shocks, they did appear to fragment well.  The stent was

exchanged with a 6 x 22 Polaris with no string attached. 



OPERATIVE TECHNIQUE:  We obtained written and verbal consent from the patient after

receiving IV antibiotics, she was taken to the operating suite.  She was placed in

the supine position on the treatment table.  PlexiPulses were placed on her lower

extremities and turned on.  She was given a general anesthetic oral obturator

intubation.  She was placed in the dorsal lithotomy position, she was sterilely

prepped and draped.  Cystoscopy was performed with a 22-Romanian sheath, this was well

lubricated and passed under direct vision through the female urethra and into the

urinary bladder with an aid of 30-degree lens video camera and monitor.  The bladder

was filled and emptied couple of times and then the distal end of the double J stent

was grasped and brought out through the urethral meatus, the guidewire was fed up

through this, the stent was removed over the guidewire and discarded.  A 5-Romanian

Pollack catheter was then advanced over the guidewire, removing the guidewire after

the stent was up in the proximal ureter, injecting contrast, filling up the proximal

ureter and renal pelvis.  There were no obvious stones in the proximal ureter.  No

obstruction and no stones were visualized in the renal pelvis.  The guidewire was

replaced.  The guidewire was then back loaded through the cystoscope and a 6 x 22

Polaris double J-stent was placed up over the guidewire and pushed up into place, so

that its proximal end coiled in the renal pelvis and its distal end coiled in the

bladder after the wire was removed.  The bladder was drained and instruments were

removed.  She was returned to the supine position.  She was coupled to the

lithotripsy unit.  The stone in the renal pelvis was placed in treatment focal

point.  Shockwave therapy was commenced after a couple of hundred shocks and pause

was given, we slowly went up to KUB level 4, we stayed at rate of 60.  We treated a

second stone in the renal pelvis probably incompletely, but a total of 2500 shocks

were given.  The patient was awakened, extubated, and taken by stretcher to the

recovery room. 







Job ID:  [id number]

## 2019-01-14 ENCOUNTER — HOSPITAL ENCOUNTER (OUTPATIENT)
Dept: HOSPITAL 92 - SDC | Age: 74
Discharge: HOME | End: 2019-01-14
Attending: UROLOGY
Payer: MEDICARE

## 2019-01-14 VITALS — BODY MASS INDEX: 32.3 KG/M2

## 2019-01-14 DIAGNOSIS — I25.10: ICD-10-CM

## 2019-01-14 DIAGNOSIS — I25.2: ICD-10-CM

## 2019-01-14 DIAGNOSIS — Z79.899: ICD-10-CM

## 2019-01-14 DIAGNOSIS — G82.20: ICD-10-CM

## 2019-01-14 DIAGNOSIS — N20.0: Primary | ICD-10-CM

## 2019-01-14 DIAGNOSIS — Z79.2: ICD-10-CM

## 2019-01-14 PROCEDURE — 82365 CALCULUS SPECTROSCOPY: CPT

## 2019-01-14 PROCEDURE — 74420 UROGRAPHY RTRGR +-KUB: CPT

## 2019-01-14 PROCEDURE — C1758 CATHETER, URETERAL: HCPCS

## 2019-01-14 PROCEDURE — 0TF38ZZ FRAGMENTATION IN RIGHT KIDNEY PELVIS, VIA NATURAL OR ARTIFICIAL OPENING ENDOSCOPIC: ICD-10-PCS | Performed by: UROLOGY

## 2019-01-14 PROCEDURE — 88300 SURGICAL PATH GROSS: CPT

## 2019-01-14 PROCEDURE — 0T768DZ DILATION OF RIGHT URETER WITH INTRALUMINAL DEVICE, VIA NATURAL OR ARTIFICIAL OPENING ENDOSCOPIC: ICD-10-PCS | Performed by: UROLOGY

## 2019-01-14 PROCEDURE — 52356 CYSTO/URETERO W/LITHOTRIPSY: CPT

## 2019-01-14 NOTE — OP
DATE OF PROCEDURE:  01/14/2019



PREOPERATIVE DIAGNOSIS:  Right renal stones.



POSTOPERATIVE DIAGNOSIS:  Right renal stones.



PROCEDURE PERFORMED:  Right rigid and flexible ureteroscopy with laser lithotripsy

of stones and stone retrieval. 



ANESTHETIC:  General.



ESTIMATED BLOOD LOSS:  Less than 50.



DRAINS PLACED:  A 6 x 22 polaris double-J stent with a string attached.



FINDINGS:  There are multiple stone fragments and small stones in the right renal

pelvis and upper calyceal system that were grasped and removed or broken up into

very small pieces with the laser and the use of a Nitinol basket. 



DESCRIPTION OF PROCEDURE:  Obtained written and verbal consent from the patient.

After receiving IV Rocephin and then being taken to the OR and then started there 1

g of IV vancomycin slowly, she was placed in a supine position on the treatment

table.  PlexiPulses were placed on her lower extremities.  She was given a general

anesthetic and oral intubation.  She was placed in the dorsal lithotomy position and

sterilely prepped and draped.  Cystoscopy was performed with a 22-Colombian sheath.

This was well lubricated and passed under direct vision through the female urethra

and into the bladder with aid of a 30-degree lens and a video camera and monitor.

The bladder was filled and emptied number of times and the distal end of the

indwelling double-J stent was grasped and brought out through the urethral meatus,

and a guidewire was fed up through this removing the stent and discarding it.  We

then brought in a dual lumen catheter, placed over this guidewire up to the area

where the proximal ureter, then injected contrast through the other port to show the

collecting system and ureter.  I then fed a second guidewire up and then removed the

dual lumen catheter.  At this point, we brought in a small caliber, graduated, rigid

ureteroscope and passed this under direct vision adjacent to one of the guidewires

up to the renal pelvis.  There were no stones in the ureter.  There were number of

stones in the renal pelvis that we would not be able to easily get with the rigid

scope.  At this point, a ureteral stent was brought in and over a stiff blue

guidewire, the obturator was passed and then the obturator with the sheath were

passed up to the region of the UPJ.  The obturator and the guidewire were removed

leaving one additional guidewire in place outside of the sheath.  We then went ahead

and brought in our flexible ureteroscope and passed it through the guidewire and

under direct vision up into the area of the renal pelvis and calyceal system.  Using

a small holmium laser fiber, we fragmented some of the stone pieces into smaller and

smaller pieces.  Once they were small enough, a few of them were basketed and

removed.  We did this to clear the renal pelvis and to clear the upper two calyceal

systems.  We did not look in the mid or lower pole calyceal system as those stones

we felt would not pose her significant risk to passage.  Once this was completed, we

went ahead and removed the sheath under direct vision with the aid of the rigid

scope.  There were no stones in the ureter.  We then passed a 5-Colombian pollack

catheter over existing guidewire, injecting contrast and there was no evidence of

any extravasation.  There were some filling defects, most likely some blood clots in

the proximal ureter, but nothing that would suggest stones.  We then over our

guidewire fed a 6 x 22 polaris double-J stent pushing up into place with the aid of

a pusher, so that its proximal end coiled in the renal pelvis and its distal end

coiled in the bladder when the wires removed.  At this point, the patient's bladder

was drained.  The instruments were removed.  She was taken out of the dorsal

lithotomy position.  She was awakened.  She was extubated, and she was taken by

teddy to recovery room. 







Job ID:  [id number]

## 2019-01-14 NOTE — RAD
RETROGRADE PYELOGRAM:

 

HISTORY:

Right ureteral stones.

 

FINDINGS:

A right ureteral stent is initially noted in place.  Injection demonstrates numerous filling defects 
within the upper right ureter and right upper collecting system.  Additional imaging demonstrates man
ipulation to remove these stones.

 

Final image demonstrates replacement of a right ureteral stent.

 

IMPRESSION:

1.  Multiple renal calculi in the right upper collecting system and upper ureter.

 

2.  Replacement of right ureteral stent following manipulation to remove stones.

 

POS: [facility]

## 2019-01-17 LAB
AMM URATE MFR STONE: 25 %
BLD.DRIED MFR STONE: (no result) %
CA BILIRUB MFR STONE: (no result) %
CA CARBONATE MFR STONE: (no result) %
CA H2 PHOS DIHYD MFR STONE: (no result) %
CA PHOS MFR STONE: 60 %
CALCIUM OXALATE DIHYDRATE MFR STONE IR: (no result) %
CELL MATERIAL STONE EST-MCNT: (no result) %
CHOLEST MFR STONE: (no result) %
COM MFR STONE: 5 %
COMMENT: (no result)
CYSTINE MFR STONE: (no result) %
NA URATE MFR STONE IR: (no result) %
NEWBERYITE MFR STONE: (no result) %
TRI-PHOS MFR STONE: 10 %
TRIAMTERENE MFR STONE: (no result) %
URATE DIHYD MFR STONE: (no result) %
URATE MFR STONE: (no result) %
WT STONE: 249.1 MG

## 2019-01-22 ENCOUNTER — HOSPITAL ENCOUNTER (INPATIENT)
Dept: HOSPITAL 92 - ERS | Age: 74
LOS: 1 days | Discharge: HOME | DRG: 758 | End: 2019-01-23
Attending: FAMILY MEDICINE | Admitting: FAMILY MEDICINE
Payer: MEDICARE

## 2019-01-22 VITALS — BODY MASS INDEX: 30.7 KG/M2

## 2019-01-22 DIAGNOSIS — T83.123A: ICD-10-CM

## 2019-01-22 DIAGNOSIS — E66.9: ICD-10-CM

## 2019-01-22 DIAGNOSIS — N20.0: ICD-10-CM

## 2019-01-22 DIAGNOSIS — I25.2: ICD-10-CM

## 2019-01-22 DIAGNOSIS — B37.49: Primary | ICD-10-CM

## 2019-01-22 DIAGNOSIS — I10: ICD-10-CM

## 2019-01-22 DIAGNOSIS — K59.09: ICD-10-CM

## 2019-01-22 DIAGNOSIS — K56.41: ICD-10-CM

## 2019-01-22 DIAGNOSIS — Z84.1: ICD-10-CM

## 2019-01-22 DIAGNOSIS — E86.0: ICD-10-CM

## 2019-01-22 DIAGNOSIS — E87.6: ICD-10-CM

## 2019-01-22 DIAGNOSIS — Z79.899: ICD-10-CM

## 2019-01-22 DIAGNOSIS — E78.5: ICD-10-CM

## 2019-01-22 DIAGNOSIS — F03.90: ICD-10-CM

## 2019-01-22 DIAGNOSIS — K21.9: ICD-10-CM

## 2019-01-22 DIAGNOSIS — Z87.820: ICD-10-CM

## 2019-01-22 DIAGNOSIS — Z74.01: ICD-10-CM

## 2019-01-22 DIAGNOSIS — Z98.890: ICD-10-CM
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
## 2019-01-22 LAB
ALBUMIN SERPL BCG-MCNC: 4 G/DL (ref 3.4–4.8)
ALP SERPL-CCNC: 109 U/L (ref 40–150)
ALT SERPL W P-5'-P-CCNC: 14 U/L (ref 8–55)
ANION GAP SERPL CALC-SCNC: 16 MMOL/L (ref 10–20)
AST SERPL-CCNC: 14 U/L (ref 5–34)
BACTERIA UR QL AUTO: (no result) HPF
BASOPHILS # BLD AUTO: 0 THOU/UL (ref 0–0.2)
BASOPHILS NFR BLD AUTO: 0.1 % (ref 0–1)
BILIRUB SERPL-MCNC: 0.7 MG/DL (ref 0.2–1.2)
BUN SERPL-MCNC: 13 MG/DL (ref 9.8–20.1)
CALCIUM SERPL-MCNC: 9.3 MG/DL (ref 7.8–10.44)
CHLORIDE SERPL-SCNC: 105 MMOL/L (ref 98–107)
CO2 SERPL-SCNC: 23 MMOL/L (ref 23–31)
CREAT CL PREDICTED SERPL C-G-VRATE: 0 ML/MIN (ref 70–130)
CRYSTAL-AUWI FLAG: 10.5 (ref 0–15)
CRYSTALS URNS QL MICRO: (no result) HPF
EOSINOPHIL # BLD AUTO: 0.1 THOU/UL (ref 0–0.7)
EOSINOPHIL NFR BLD AUTO: 0.9 % (ref 0–10)
GLOBULIN SER CALC-MCNC: 3.7 G/DL (ref 2.4–3.5)
GLUCOSE SERPL-MCNC: 180 MG/DL (ref 83–110)
HEV IGM SER QL: 5.6 (ref 0–7.99)
HGB BLD-MCNC: 14.4 G/DL (ref 12–16)
HYALINE CASTS #/AREA URNS LPF: (no result) LPF
LYMPHOCYTES # BLD: 1.5 THOU/UL (ref 1.2–3.4)
LYMPHOCYTES NFR BLD AUTO: 17.6 % (ref 21–51)
MANUAL MICROSCOPIC REVIEWED?: (no result)
MCH RBC QN AUTO: 31.3 PG (ref 27–31)
MCV RBC AUTO: 97 FL (ref 78–98)
MONOCYTES # BLD AUTO: 0.4 THOU/UL (ref 0.11–0.59)
MONOCYTES NFR BLD AUTO: 4.9 % (ref 0–10)
NEUTROPHILS # BLD AUTO: 6.5 THOU/UL (ref 1.4–6.5)
NEUTROPHILS NFR BLD AUTO: 76.5 % (ref 42–75)
PATHC CAST-AUWI FLAG: 78.97 (ref 0–2.49)
PLATELET # BLD AUTO: 352 THOU/UL (ref 130–400)
POTASSIUM SERPL-SCNC: 3.4 MMOL/L (ref 3.5–5.1)
PROT UR STRIP.AUTO-MCNC: 100 MG/DL
RBC # BLD AUTO: 4.6 MILL/UL (ref 4.2–5.4)
RBC UR QL AUTO: (no result) HPF (ref 0–3)
SODIUM SERPL-SCNC: 141 MMOL/L (ref 136–145)
SP GR UR STRIP: 1.02 (ref 1–1.04)
SPERM-AUWI FLAG: 0 (ref 0–9.9)
TROPONIN I SERPL DL<=0.01 NG/ML-MCNC: 0.02 NG/ML (ref ?–0.03)
WBC # BLD AUTO: 8.6 THOU/UL (ref 4.8–10.8)
YEAST FLD HPF-#/AREA: (no result) HPF
YEAST-AUWI FLAG: 986.7 (ref 0–25)

## 2019-01-22 PROCEDURE — 84484 ASSAY OF TROPONIN QUANT: CPT

## 2019-01-22 PROCEDURE — 83605 ASSAY OF LACTIC ACID: CPT

## 2019-01-22 PROCEDURE — 74176 CT ABD & PELVIS W/O CONTRAST: CPT

## 2019-01-22 PROCEDURE — 71045 X-RAY EXAM CHEST 1 VIEW: CPT

## 2019-01-22 PROCEDURE — A4353 INTERMITTENT URINARY CATH: HCPCS

## 2019-01-22 PROCEDURE — 74019 RADEX ABDOMEN 2 VIEWS: CPT

## 2019-01-22 PROCEDURE — 87804 INFLUENZA ASSAY W/OPTIC: CPT

## 2019-01-22 PROCEDURE — 87086 URINE CULTURE/COLONY COUNT: CPT

## 2019-01-22 PROCEDURE — 81015 MICROSCOPIC EXAM OF URINE: CPT

## 2019-01-22 PROCEDURE — 0TP97DZ REMOVAL OF INTRALUMINAL DEVICE FROM URETER, VIA NATURAL OR ARTIFICIAL OPENING: ICD-10-PCS | Performed by: EMERGENCY MEDICINE

## 2019-01-22 PROCEDURE — 36415 COLL VENOUS BLD VENIPUNCTURE: CPT

## 2019-01-22 PROCEDURE — 96375 TX/PRO/DX INJ NEW DRUG ADDON: CPT

## 2019-01-22 PROCEDURE — 81003 URINALYSIS AUTO W/O SCOPE: CPT

## 2019-01-22 PROCEDURE — 96365 THER/PROPH/DIAG IV INF INIT: CPT

## 2019-01-22 PROCEDURE — 96366 THER/PROPH/DIAG IV INF ADDON: CPT

## 2019-01-22 PROCEDURE — 85025 COMPLETE CBC W/AUTO DIFF WBC: CPT

## 2019-01-22 PROCEDURE — 93005 ELECTROCARDIOGRAM TRACING: CPT

## 2019-01-22 PROCEDURE — 80048 BASIC METABOLIC PNL TOTAL CA: CPT

## 2019-01-22 PROCEDURE — 51701 INSERT BLADDER CATHETER: CPT

## 2019-01-22 PROCEDURE — 80053 COMPREHEN METABOLIC PANEL: CPT

## 2019-01-22 PROCEDURE — 87040 BLOOD CULTURE FOR BACTERIA: CPT
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
## 2019-01-22 NOTE — RAD
PORTABLE CHEST:

 

Comparison: 6-28-16

 

History: Fever. 

 

FINDINGS: 

Heart size within normal limits. There are arthrosclerotic changes of the aorta. Lungs show chronic a
ppearing change. Some linear scarring in the left base. 

 

IMPRESSION: 

Chronic lung change. No active intrathoracic disease. 

 

POS: [facility]

## 2019-01-22 NOTE — CT
CT ABDOMEN AND PELVIS 

1/22/19

 

COMPARISON:  

12/27/18

 

HISTORY: 

Ureteral stent dislodged, weakness.

 

TECHNIQUE:  

Axial CT imaging at 5 mm intervals from lung bases through pubic symphysis without contrast. Coronal 
reformatted imaging obtained. 

 

FINDINGS:  

The imaged lung bases appear unremarkable. The lack of contrast limits assessment of the viscera, bow
el, vascular structures and for lymphadenopathy. Minimal hazy density in the right lower lobe posteri
christiane on image 5 and the lateral aspect of the lingula and left lower lobe on image 4 suggests volume 
loss. 

 

Limited assessment of the liver appears unremarkable. Foci of calcification within the region of the 
gallbladder fundus and medial aspect of the gallbladder suggests cholelithiasis which could be better
 assessed via followup ultrasound. The spleen, pancreas, and adrenal glands demonstrate no acute find
ings. 

 

There is no evidence for nephrolithiasis or obstructive uropathy on the left. 

 

There are numerous stones within the right kidney including multiple up per mid and lower pole stones
. Stone disease measures up to 1.2 cm transverse dimension in right upper pole, up to 1.7 cm transver
se dimension in right mid pole and up to 8-9 mm transverse dimension right lower pole. The degree of 
stone burden has decreased since the 12/27/18 study on the right. There is gas within the renal colle
cting system anteriorly on the right and within the proximal right ureter. The proximal right ureter 
is distended. The double-J ureteral stent present on the prior examination within the right kidney ha
s been dislodged distally. The proximal aspect of the stent now lies within the mid right ureter at t
he axial level of the L5-S1 intervertebral disc space. The distal aspect of the stent extends outside
 the urinary bladder through the urethra and is likely visible. 

 

The rectum is markedly distended and filled with stool, a stable finding, evidence of fecal impaction
. The stool filled and expanded rectum measures 10.4 x 12.6 cm. 

 

There is scattered diverticulosis without evidence for diverticulitis, primarily involving the descen
ding colon and sigmoid colon. 

 

There is an IVC filter in place. 

 

Review of the osseous structures demonstrates diffuse osteopenia. There is significant multilevel deg
enerative change noted throughout the imaged spine with multifocal disc space narrowing, degenerative
 end plate changes and osteophyte formation.

 

IMPRESSION:  

1.      There are numerous stones within the right kidney, the stone burden decreased when compared t
o the prior exam. There is gas within a proximal dilated right ureter and within the right renal darin
ecting system consistent with recent instrumentation. The double-J ureteral stent on the right has be
en significantly distally dislodged as detailed above. 

2.      Evidence of fecal impaction. 

 

POS: [facility]

## 2019-01-22 NOTE — RAD
TWO VIEW ABDOMEN:

 

HISTORY: 

Weakness.  The patient is not eating.

 

FINDINGS: 

Supine and left decubitus views of the abdomen obtained.

 

Images demonstrate an inferior vena caval filter in place.  A right ureteral stent is in place.

 

A large amount of stool is seen throughout the colon and in the rectum compatible with severe constip
ation.  No evidence of bowel obstruction is seen.

 

IMPRESSION: 

A large amount of stool in the colon.  Could this be the cause of the patient's not eating?

 

POS: [facility]

## 2019-01-23 VITALS — TEMPERATURE: 97.9 F | DIASTOLIC BLOOD PRESSURE: 60 MMHG | SYSTOLIC BLOOD PRESSURE: 141 MMHG

## 2019-01-23 LAB
ANION GAP SERPL CALC-SCNC: 12 MMOL/L (ref 10–20)
BASOPHILS # BLD AUTO: 0 THOU/UL (ref 0–0.2)
BASOPHILS NFR BLD AUTO: 0.2 % (ref 0–1)
BUN SERPL-MCNC: 10 MG/DL (ref 9.8–20.1)
CALCIUM SERPL-MCNC: 8.3 MG/DL (ref 7.8–10.44)
CHLORIDE SERPL-SCNC: 108 MMOL/L (ref 98–107)
CO2 SERPL-SCNC: 26 MMOL/L (ref 23–31)
CREAT CL PREDICTED SERPL C-G-VRATE: 112 ML/MIN (ref 70–130)
EOSINOPHIL # BLD AUTO: 0.1 THOU/UL (ref 0–0.7)
EOSINOPHIL NFR BLD AUTO: 1.1 % (ref 0–10)
GLUCOSE SERPL-MCNC: 107 MG/DL (ref 83–110)
HGB BLD-MCNC: 12.5 G/DL (ref 12–16)
LYMPHOCYTES # BLD: 1.3 THOU/UL (ref 1.2–3.4)
LYMPHOCYTES NFR BLD AUTO: 13.6 % (ref 21–51)
MCH RBC QN AUTO: 30.8 PG (ref 27–31)
MCV RBC AUTO: 94.4 FL (ref 78–98)
MONOCYTES # BLD AUTO: 0.8 THOU/UL (ref 0.11–0.59)
MONOCYTES NFR BLD AUTO: 7.9 % (ref 0–10)
NEUTROPHILS # BLD AUTO: 7.3 THOU/UL (ref 1.4–6.5)
NEUTROPHILS NFR BLD AUTO: 77.2 % (ref 42–75)
PLATELET # BLD AUTO: 212 THOU/UL (ref 130–400)
POTASSIUM SERPL-SCNC: 3.5 MMOL/L (ref 3.5–5.1)
RBC # BLD AUTO: 4.05 MILL/UL (ref 4.2–5.4)
SODIUM SERPL-SCNC: 142 MMOL/L (ref 136–145)
WBC # BLD AUTO: 9.5 THOU/UL (ref 4.8–10.8)

## 2019-01-23 NOTE — PDOC.FM
- Subjective


Subjective: 





NAEO as reported by nursing staff.  Had multiple small BM's overnight and one 

very large BM this am.  Pt is resting comfortably and reports no complaints.  

Denies abd pain, dysuria, n/v.  





- Objective


MAR Reviewed: Yes


Vital Signs & Weight: 


 Vital Signs (12 hours)











  Temp Pulse Resp BP Pulse Ox


 


 01/23/19 03:06  97.3 F L  61  20  131/61  93 L


 


 01/22/19 23:51  97.5 F L  67  19  142/66 H  94 L


 


 01/22/19 20:25  98.1 F  77  18  154/67 H  94 L








 Weight











Weight                         86.409 kg














Result Diagrams: 


 01/23/19 05:07





 01/23/19 05:07





Phys Exam





- Physical Examination


Constitutional: NAD


HEENT: moist MMs


Respiratory: no wheezing, no rales, clear to auscultation bilateral


Cardiovascular: RRR, no significant murmur


Gastrointestinal: soft, non-tender, no distention, positive bowel sounds


Musculoskeletal: no edema


RUE and RLE paralysis, no other weakness noted


Deviation from normal: AOx1 (baseline)


Skin: no rash





Dx/Plan


(1) Fecal impaction


Code(s): K56.41 - FECAL IMPACTION   Status: Acute   





(2) UTI (urinary tract infection)


Status: Acute   


Qualifiers: 


 





(3) Dyslipidemia


Code(s): E78.5 - HYPERLIPIDEMIA, UNSPECIFIED   Status: Chronic   





(4) H/O traumatic brain injury


Code(s): Z87.820 - PERSONAL HISTORY OF TRAUMATIC BRAIN INJURY   Status: Chronic

   





(5) HTN (hypertension)


Code(s): I10 - ESSENTIAL (PRIMARY) HYPERTENSION   Status: Chronic   


Qualifiers: 


 





(6) Obesity (BMI 30.0-34.9)


Code(s): E66.9 - OBESITY, UNSPECIFIED   Status: Chronic   





(7) Chronic constipation


Code(s): K59.09 - OTHER CONSTIPATION   Status: Chronic   





(8) History of MI (myocardial infarction)


Code(s): I25.2 - OLD MYOCARDIAL INFARCTION   Status: Acute   





- Plan


Plan: 











Ureteral Stent Dislodgement with Renal Calculi


Likely happened in ED after repositioning and cleaning.  Seen on CTabd/pelvis.  

Still with multiple calculi in renal pelvis.


- Discussed with Dr. Vegas who recommended removal of stent and Cefepime 1g.  

Plans to follow with pt tomorrow. 


- Monitor for worsening s/sx.  





Asymptomatic Bacteruria vs UTI


UA with nitrites and bacteria.  No elevation in WBC or LA.  Pt asymptomatic but 

also poor historian. 


- Urine cx pending, prelim shows yeast species


- s/p Rocephin x1 in ED, will start cefepime per Uro recs





Fecal Impaction, resolved


Pt with decreased PO intake and abd pain on exam. Failed home miralax.  Shown 

on KUB and CT 


- s/p golytely and colace


- cont IVF until we are sure she is tolerating PO


- discuss chronic bowel regimen with dtr to prevent future recurrence





Mild Dehydration, resolved


Initially with tachycardia and 2 days of decreased PO intake.  


- s/p 500ml bolus in ED, tachycardia now resolved


- cont maitenance fluids LR at 120ml/hr until tolerating PO. 





Chronic Constipation


Discuss bowel regimen going home with dtr.  


- likely need daily regimen of colace and metamucil with prn miralax





Hypokalemia, resolved





GERD


- continue home meds





TBI with resultant Dementia and RUE, RLE Weakness


- At baseline per dtr





HLD


- continue home meds





DVT Ppx: Lovenox, IVC filter in place 


GI Ppx: none


Code status: Full per MPOA





Dispo: DC pending urology recs.  





Addendum - Attending





- Attending Attestation


Date/Time: 01/31/19 1502





I personally evaluated the patient and discussed the management with Dr. Martin 

on 1/23/19.


I agree with the History, Examination, Assessment and Plan documented above 

with any addition or exceptions noted below.


+BM's today and feeling better.  D/C on bowel maintenance regime.  Plan 

regarding nephrolithiasis and stent per Urology,  Dr. Cheney's rec's 

appreciated and pt. to be d/c'd with f/u with him.
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
## 2019-01-24 NOTE — DIS
DATE OF ADMISSION:  2019



Admitting Attending: Dr. Salvador Mccord



Resident: Marimar Martin



DATE OF DISCHARGE:  2019



Discharge Attending: Dr. Vinicio Bee



IMAGIN. Chest x-ray on  shows chronic lung changes, no active intrathoracic 
disease.

2. Abdominal x-ray on , which shows large amount of stool in the colon.

3. Abdomen and pelvis CT scan on  showed fecal impaction, right upper mid 
and

lower pole of the left kidney with stones measuring up to 1.7 cm, and a 
dislodged

ureteral stent. 



PROCEDURES:  None current.



CONSULTATIONS:  Dr. Vegas, Urology.



PRIMARY DIAGNOSES:  

1. Fecal impaction, resolved.

2. Mild dehydration.

3. Dislodged ureteral stent.

4. Chronic renal calculi of the right kidney.

5. Urinary tract infection.



SECONDARY DIAGNOSES:  

1. Hypertension.

2. History of traumatic brain injury with residual weakness.

3. Dementia.

4. Gastroesophageal reflux disease.



DISCHARGE MEDICATIONS:  

1. Ferrous sulfate 325 mg p.o. at bedtime.

2. Sertraline 50 mg p.o. at bedtime.

3. Protonix 40 mg p.o. at bedtime.

4. Lactobacillus probiotic 1 capsule at bedtime.

5. MiraLaX 17 g p.o. daily.

6. Vitamin D3 1000 units p.o. daily.

7. Diflucan 100 mg x2 doses  by 3 days.



DISCONTINUED MEDICATION:  Macrobid.



HISTORY OF PRESENT ILLNESS/HOSPITAL COURSE:  The patient is a 73-year-old female

with baseline dementia and recent renal calculi status post lithotripsy and 
calculi

retrieval on  by Dr. Vegas who presented to the ED for generalized 
weakness

and decreased p.o. intake.  She also reported no bowel movement x7 days.  She 
has a

history of chronic constipation what seems to be the awaiting diagnosis.  The

patient with evidence of fecal impaction on CT and KUB.  During the course of 
her

evaluation, it did appear that the right ureteral stent looking dislodged 
confirmed

on CT.  Dr. Vegas was called and recommended removal of the stent entirely 
with the

CT exam to evaluate stone burden.  Stone burden appeared improved from prior 
exam.

She was given cefepime x1 IV for coverage and urine cx and UA was collected.

Urine was dirty with leukocyte esterase, positive nitrites and 2+ bacteria and 
urine

culture growing yeast species.  Dr. Vegas recommended discontinuing of IV

antibiotics with p.o. diflucan and followup in the clinic in 1 day.  The 
patient had

a large bowel movement after having GoLYTELY and pain resolved.  The patient was

tolerating p.o. on the day of discharge.  No signs or symptoms of sepsis were 
noted

or even infection, with normal white count, lactic acid and vital signs. 



DISCHARGE DISPOSITION:  Stable.



DISCHARGE INSTRUCTION:  Location:  Home. 

Diet:  Regular. 

Activity:  As tolerated. 

Followup:  With [facility] in 1 day and PCP Dr. Bradley in 1 week.







Job ID:  [id number]



MTDD

## 2019-01-26 NOTE — EKG
Test Reason : 

Blood Pressure : ***/*** mmHG

Vent. Rate : 120 BPM     Atrial Rate : 120 BPM

   P-R Int : 156 ms          QRS Dur : 090 ms

    QT Int : 328 ms       P-R-T Axes : 009 -45 106 degrees

   QTc Int : 463 ms

 

Sinus tachycardia with frequent Premature ventricular complexes

Left anterior fascicular block

Left ventricular hypertrophy with repolarization abnormality

Abnormal ECG

 

Confirmed by JERSON ALVAREZ D.O. ([id number]),  KIN JACKSON ([id number]) on 1/26/2019 8:45:41 PM

 

Referred By:             Confirmed By:JERSON ALVAREZ D.O.

## 2019-04-11 ENCOUNTER — HOSPITAL ENCOUNTER (INPATIENT)
Dept: HOSPITAL 92 - ERS | Age: 74
LOS: 8 days | Discharge: NURSING, REHAB OR LONG-TERM CARE | DRG: 853 | End: 2019-04-19
Attending: FAMILY MEDICINE | Admitting: FAMILY MEDICINE
Payer: MEDICARE

## 2019-04-11 VITALS — BODY MASS INDEX: 33.8 KG/M2

## 2019-04-11 DIAGNOSIS — E83.42: ICD-10-CM

## 2019-04-11 DIAGNOSIS — D69.6: ICD-10-CM

## 2019-04-11 DIAGNOSIS — F03.90: ICD-10-CM

## 2019-04-11 DIAGNOSIS — J44.0: ICD-10-CM

## 2019-04-11 DIAGNOSIS — Z87.820: ICD-10-CM

## 2019-04-11 DIAGNOSIS — T14.90XS: ICD-10-CM

## 2019-04-11 DIAGNOSIS — N13.6: ICD-10-CM

## 2019-04-11 DIAGNOSIS — Z87.442: ICD-10-CM

## 2019-04-11 DIAGNOSIS — E87.4: ICD-10-CM

## 2019-04-11 DIAGNOSIS — I25.2: ICD-10-CM

## 2019-04-11 DIAGNOSIS — E87.0: ICD-10-CM

## 2019-04-11 DIAGNOSIS — E66.9: ICD-10-CM

## 2019-04-11 DIAGNOSIS — K21.9: ICD-10-CM

## 2019-04-11 DIAGNOSIS — J44.9: ICD-10-CM

## 2019-04-11 DIAGNOSIS — D50.9: ICD-10-CM

## 2019-04-11 DIAGNOSIS — R65.21: ICD-10-CM

## 2019-04-11 DIAGNOSIS — E87.6: ICD-10-CM

## 2019-04-11 DIAGNOSIS — A41.51: Primary | ICD-10-CM

## 2019-04-11 DIAGNOSIS — K59.09: ICD-10-CM

## 2019-04-11 DIAGNOSIS — E78.5: ICD-10-CM

## 2019-04-11 DIAGNOSIS — J96.01: ICD-10-CM

## 2019-04-11 DIAGNOSIS — J90: ICD-10-CM

## 2019-04-11 DIAGNOSIS — G81.91: ICD-10-CM

## 2019-04-11 DIAGNOSIS — I10: ICD-10-CM

## 2019-04-11 LAB
ALBUMIN SERPL BCG-MCNC: 2.9 G/DL (ref 3.4–4.8)
ALP SERPL-CCNC: 94 U/L (ref 40–150)
ALT SERPL W P-5'-P-CCNC: 20 U/L (ref 8–55)
ANALYZER IN CARDIO: (no result)
ANION GAP SERPL CALC-SCNC: 18 MMOL/L (ref 10–20)
AST SERPL-CCNC: 28 U/L (ref 5–34)
BACTERIA UR QL AUTO: (no result) HPF
BASE EXCESS STD BLDA CALC-SCNC: -12.3 MEQ/L
BICARBONATE (HCO3V): 18.2 MMOL/L (ref 22–28)
BILIRUB SERPL-MCNC: 0.6 MG/DL (ref 0.2–1.2)
BUN SERPL-MCNC: 20 MG/DL (ref 9.8–20.1)
CA-I BLD-SCNC: 1.15 MMOL/L
CA-I BLDA-SCNC: 1.09 MMOL/L (ref 1.12–1.3)
CALCIUM SERPL-MCNC: 7.9 MG/DL (ref 7.8–10.44)
CHLORIDE SERPL-SCNC: 111 MMOL/L (ref 98–107)
CHLORIDE SERPL-SCNC: 112 MMOL/L (ref 98–107)
CO2 BLDV CALC-SCNC: 19.7 MMOL/L (ref 22–28)
CO2 SERPL-SCNC: 16 MMOL/L (ref 23–31)
CO2 TENSION (PVCO2): 50.7 MMHG (ref 40–50)
CREAT CL PREDICTED SERPL C-G-VRATE: 0 ML/MIN (ref 70–130)
CRYSTAL-AUWI FLAG: 0 (ref 0–15)
GLOBULIN SER CALC-MCNC: 3 G/DL (ref 2.4–3.5)
GLUCOSE SERPL-MCNC: 154 MG/DL (ref 83–110)
HCO3 BLDA-SCNC: 14.6 MEQ/L (ref 22–28)
HCT VFR BLD CALC: 43 % (ref 36–47)
HCT VFR BLDA CALC: 34 % (ref 36–47)
HEMOGLOBIN - CALC: 14.7 G/DL (ref 12–16)
HEV IGM SER QL: 0 (ref 0–7.99)
HGB BLD-MCNC: 13.3 G/DL (ref 12–16)
HGB BLDA-MCNC: 11.7 G/DL (ref 12–16)
HYALINE CASTS #/AREA URNS LPF: (no result) LPF
MANUAL MICROSCOPIC REVIEWED?: (no result)
MCH RBC QN AUTO: 31 PG (ref 27–31)
MCV RBC AUTO: 95.4 FL (ref 78–98)
MDIFF COMPLETE?: YES
O2 TENSION (PVO2): 30.9 MMHG (ref 35–45)
PATHC CAST-AUWI FLAG: 8.25 (ref 0–2.49)
PCO2 BLDA: 37 MMHG (ref 35–45)
PH BLDA: 7.22 [PH] (ref 7.35–7.45)
PLATELET # BLD AUTO: 229 THOU/UL (ref 130–400)
PO2 BLDA: 101.4 MMHG (ref 70–?)
POLYCHROMASIA BLD QL SMEAR: (no result) (100X)
POTASSIUM BLD-SCNC: 2.6 MMOL/L (ref 3.7–5.3)
POTASSIUM SERPL-SCNC: 2.9 MMOL/L (ref 3.5–5.1)
POTASSIUM SERPL-SCNC: 3.1 MMOL/L (ref 3.5–5.1)
PROT UR STRIP.AUTO-MCNC: 30 MG/DL
RBC # BLD AUTO: 4.3 MILL/UL (ref 4.2–5.4)
RBC UR QL AUTO: (no result) HPF (ref 0–3)
REFLEX FOR REVIEW??: NO
SAO2 % BLDV FROM PO2: 43.4 % (ref 60–85)
SODIUM SERPL-SCNC: 143 MMOL/L (ref 136–145)
SODIUM SERPL-SCNC: 145 MMOL/L (ref 138–145)
SP GR UR STRIP: 1.02 (ref 1–1.04)
SPECIMEN DRAWN FROM PATIENT: (no result)
SPERM-AUWI FLAG: 0 (ref 0–9.9)
TOXIC GRANULES BLD QL SMEAR: SLIGHT
WBC # BLD AUTO: 16.4 THOU/UL (ref 4.8–10.8)
YEAST-AUWI FLAG: 65.9 (ref 0–25)

## 2019-04-11 PROCEDURE — 71045 X-RAY EXAM CHEST 1 VIEW: CPT

## 2019-04-11 PROCEDURE — 93005 ELECTROCARDIOGRAM TRACING: CPT

## 2019-04-11 PROCEDURE — 85730 THROMBOPLASTIN TIME PARTIAL: CPT

## 2019-04-11 PROCEDURE — 51701 INSERT BLADDER CATHETER: CPT

## 2019-04-11 PROCEDURE — 87186 SC STD MICRODIL/AGAR DIL: CPT

## 2019-04-11 PROCEDURE — 36415 COLL VENOUS BLD VENIPUNCTURE: CPT

## 2019-04-11 PROCEDURE — 5A1955Z RESPIRATORY VENTILATION, GREATER THAN 96 CONSECUTIVE HOURS: ICD-10-PCS | Performed by: ANESTHESIOLOGY

## 2019-04-11 PROCEDURE — 80053 COMPREHEN METABOLIC PANEL: CPT

## 2019-04-11 PROCEDURE — 93010 ELECTROCARDIOGRAM REPORT: CPT

## 2019-04-11 PROCEDURE — 96365 THER/PROPH/DIAG IV INF INIT: CPT

## 2019-04-11 PROCEDURE — 94640 AIRWAY INHALATION TREATMENT: CPT

## 2019-04-11 PROCEDURE — 84145 PROCALCITONIN (PCT): CPT

## 2019-04-11 PROCEDURE — 94003 VENT MGMT INPAT SUBQ DAY: CPT

## 2019-04-11 PROCEDURE — 82330 ASSAY OF CALCIUM: CPT

## 2019-04-11 PROCEDURE — C1758 CATHETER, URETERAL: HCPCS

## 2019-04-11 PROCEDURE — 87449 NOS EACH ORGANISM AG IA: CPT

## 2019-04-11 PROCEDURE — 80048 BASIC METABOLIC PNL TOTAL CA: CPT

## 2019-04-11 PROCEDURE — 87086 URINE CULTURE/COLONY COUNT: CPT

## 2019-04-11 PROCEDURE — 87493 C DIFF AMPLIFIED PROBE: CPT

## 2019-04-11 PROCEDURE — A4353 INTERMITTENT URINARY CATH: HCPCS

## 2019-04-11 PROCEDURE — 87077 CULTURE AEROBIC IDENTIFY: CPT

## 2019-04-11 PROCEDURE — 85007 BL SMEAR W/DIFF WBC COUNT: CPT

## 2019-04-11 PROCEDURE — 03HY32Z INSERTION OF MONITORING DEVICE INTO UPPER ARTERY, PERCUTANEOUS APPROACH: ICD-10-PCS | Performed by: ANESTHESIOLOGY

## 2019-04-11 PROCEDURE — 94002 VENT MGMT INPAT INIT DAY: CPT

## 2019-04-11 PROCEDURE — 02HV33Z INSERTION OF INFUSION DEVICE INTO SUPERIOR VENA CAVA, PERCUTANEOUS APPROACH: ICD-10-PCS | Performed by: ANESTHESIOLOGY

## 2019-04-11 PROCEDURE — P9045 ALBUMIN (HUMAN), 5%, 250 ML: HCPCS

## 2019-04-11 PROCEDURE — P9047 ALBUMIN (HUMAN), 25%, 50ML: HCPCS

## 2019-04-11 PROCEDURE — 87149 DNA/RNA DIRECT PROBE: CPT

## 2019-04-11 PROCEDURE — 82805 BLOOD GASES W/O2 SATURATION: CPT

## 2019-04-11 PROCEDURE — 36416 COLLJ CAPILLARY BLOOD SPEC: CPT

## 2019-04-11 PROCEDURE — 74177 CT ABD & PELVIS W/CONTRAST: CPT

## 2019-04-11 PROCEDURE — 74420 UROGRAPHY RTRGR +-KUB: CPT

## 2019-04-11 PROCEDURE — 74018 RADEX ABDOMEN 1 VIEW: CPT

## 2019-04-11 PROCEDURE — 87324 CLOSTRIDIUM AG IA: CPT

## 2019-04-11 PROCEDURE — 85379 FIBRIN DEGRADATION QUANT: CPT

## 2019-04-11 PROCEDURE — 81003 URINALYSIS AUTO W/O SCOPE: CPT

## 2019-04-11 PROCEDURE — 81015 MICROSCOPIC EXAM OF URINE: CPT

## 2019-04-11 PROCEDURE — C9113 INJ PANTOPRAZOLE SODIUM, VIA: HCPCS

## 2019-04-11 PROCEDURE — 87804 INFLUENZA ASSAY W/OPTIC: CPT

## 2019-04-11 PROCEDURE — 83605 ASSAY OF LACTIC ACID: CPT

## 2019-04-11 PROCEDURE — 85362 FIBRIN DEGRADATION PRODUCTS: CPT

## 2019-04-11 PROCEDURE — 85027 COMPLETE CBC AUTOMATED: CPT

## 2019-04-11 PROCEDURE — 87040 BLOOD CULTURE FOR BACTERIA: CPT

## 2019-04-11 PROCEDURE — 96367 TX/PROPH/DG ADDL SEQ IV INF: CPT

## 2019-04-11 PROCEDURE — 85384 FIBRINOGEN ACTIVITY: CPT

## 2019-04-11 PROCEDURE — 85610 PROTHROMBIN TIME: CPT

## 2019-04-11 PROCEDURE — 0T768DZ DILATION OF RIGHT URETER WITH INTRALUMINAL DEVICE, VIA NATURAL OR ARTIFICIAL OPENING ENDOSCOPIC: ICD-10-PCS | Performed by: UROLOGY

## 2019-04-11 PROCEDURE — 82803 BLOOD GASES ANY COMBINATION: CPT

## 2019-04-11 PROCEDURE — 85025 COMPLETE CBC W/AUTO DIFF WBC: CPT

## 2019-04-11 PROCEDURE — 3E043XZ INTRODUCTION OF VASOPRESSOR INTO CENTRAL VEIN, PERCUTANEOUS APPROACH: ICD-10-PCS | Performed by: ANESTHESIOLOGY

## 2019-04-11 PROCEDURE — 83735 ASSAY OF MAGNESIUM: CPT

## 2019-04-11 RX ADMIN — MEROPENEM AND SODIUM CHLORIDE SCH MLS: 1 INJECTION, SOLUTION INTRAVENOUS at 16:52
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
## 2019-04-11 NOTE — RAD
EXAM: Single view of the chest



HISTORY:  Line placement



COMPARISON: 4/11/2019 at 8:49 AM



FINDINGS: Single view of the chest shows a normal sized cardiomediastinal silhouette. An endotracheal
 tube is seen with its tip along the lower border of the clavicles. A left subclavian central

venous catheter seen with its tip in the superior vena cava. No pneumothorax is seen. There is no george
dence of consolidation, mass, or pleural effusion. Degenerative changes are seen in the spine.



IMPRESSION: Appropriate position of lines and tubes



Reported By: Arcenio Alfaro 

Electronically Signed:  4/11/2019 3:04 PM

## 2019-04-11 NOTE — CT
CT Abdomen Pelvis W Con: 4/11/2019 9:43 AM



CLINICAL INFORMATION: Sepsis and diffuse abdominal pain



COMPARISON: 11/6/2018



Procedure: 



Multiple contiguous axial images were obtained and a CT of the abdomen and pelvis with IV contrast. C
oronal reformats were performed.



FINDINGS:



Lower Chest: within normal limits.



Abdomen:

Liver: There are hypodensities in the liver which are stable and likely represent cysts.

Bile Ducts: Normal caliber.

Gallbladder: Calcified gallstones are seen in the gallbladder without gallbladder wall thickening

Pancreas: within normal limits.

Spleen: within normal limits.

Adrenals: within normal limits.

Kidneys: There is moderate right hydronephrosis. Multiple calcifications are seen within the calyces 
of the right kidney. There is enlargement of the right ureter. In the proximal right ureter there is 
a 9 mm calcification. In the distal right ureter, there is a 6 mm calcification. There is a slight de
lay in the right nephrogram. No calcifications are seen in the left kidney and there is no left hydro
nephrosis.



Pelvis:

Reproductive Organs: Surgically removed

Bladder: within normal limits.



Peritoneum: No ascites or free air, no fluid collection.

Bowel: Scattered diverticula are seen in the colon. There is apparent thickening of the wall the alvarado
sverse colon likely secondary to its decompressed state. A large amount of stool is seen in the recta
l vault.

Mesentery and Retroperitoneum: No enlarged mesenteric or retroperitoneal lymph nodes.

Vessels: An inferior vena cava filter is seen. Aorta is normal in caliber and demonstrates atheroscle
rotic calcifications. 

Abdominal Wall: within normal limits.

Bones: Degenerative changes are seen in the spine.  



IMPRESSION:



1. Right ureteral calcifications with moderate right hydronephrosis

2. Other additional right renal calcifications

3. Hepatic cysts

4. Cholelithiasis

5. Diverticulosis



Reported By: Arcenio Alfaro 

Electronically Signed:  4/11/2019 10:41 AM

## 2019-04-11 NOTE — RAD
XR Chest 1 View Portable



History: [Fever. Tachypnea.]



Comparison: Radiograph January 22, 2019



Findings: There is a right perihilar airspace opacity. The lungs are hypoinflated. No pneumothorax or
 large effusion. Remote right-sided fractures.



Catheter projects over the left shoulder.



Impression: Right perihilar airspace opacity concerning for infection. Follow-up after treatment brodie
mmended.



Reported By: Manan Dillon 

Electronically Signed:  4/11/2019 9:03 AM

## 2019-04-11 NOTE — RAD
XR IVP Retrograde



History: [Right stent placement.]



Comparison: CT abdomen and pelvis prior day



Findings: There appears be a proximal mid ureteral calculus. Moderate right-sided hydronephrosis. Int
erval placement of a double-J ureteral stent in good position. On the initial image there is

retained contrast within the dilated right renal collecting system limiting calculus evaluation.



Impression: Fluoroscopy for surgical use.



Reported By: Manan Dillon 

Electronically Signed:  4/11/2019 1:55 PM
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
## 2019-04-11 NOTE — OP
DATE OF PROCEDURE:  04/11/2019



PREOPERATIVE DIAGNOSES:  Urinary tract sepsis with obstructing right ureteral stones

x2. 



PROCEDURE PERFORMED:  Cysto, right retrograde and right ureteral stent.



ANESTHETIC:  General.



ESTIMATED BLOOD LOSS:  Minimal.



FINDINGS:  She had a purulent appearing urine.  She had about 20 mL of jvaed pus

drawn from the proximal right collecting system. 



OPERATIVE TECHNIQUE:  The patient was seen in the Day Stay area where she had been

moved from the OR.  ER contacted me that she appeared to be getting septic.  She had

a CT that showed two obstructing right ureteral stones.  I reviewed the CAT scan and

agreed with it.  She received Zosyn and vancomycin.  Dr. Allen saw her and felt

because of the gravity of her illness that he would intubate her and then keep her

intubated and take her to the ICU, also would put in a better line for IV fluid

replacement and an art line.  After obtaining a consent, she was taken to the

operating suite, placed on the cystoscopic table, an art line and central line were

placed and she was given a general anesthetic and then oral intubation.  She was

then placed in the dorsal lithotomy position, sterilely prepped and draped.

Cystoscopy was performed with a 22-Indian sheath.  This was well lubricated and

passed under direct vision through the female urethra into the urinary bladder with

aid of a 30-degree lens, video camera and monitor.  The bladder had evidence of

erythema and inflammation consistent with an active infection.  Urine was cloudy and

was filled and emptied number of times.  Right ureteral orifice was identified.  We

could not feed a guidewire up it probably because of an obstructing distal stone,

but we were able to get an angle tip Glidewire by it and then advanced this up in

the region of the renal pelvis and an open-ended catheter 5-Indian was placed across

this passed proximal to the more proximal stone and then we drained out the purulent

urine, then injecting about 5 mL of contrast to a nondilated system at that point,

we then replacing the guidewire, removing the open-ended catheter and then placing a

6 x 22 polaris double-J stent over the guidewire pushing up into place with aid of a

pusher, so its proximal end coiled in the right renal pelvis and its distal end

coiled in the bladder when the wire was removed.  Tellez catheter was then inserted.

She was taken out of the dorsal lithotomy position.  She was moved by stretcher to

ICU bed and then taken intubated and ventilated up to the intensive care unit. 







Job ID:  [id number]

## 2019-04-12 LAB
ALBUMIN SERPL BCG-MCNC: 2.9 G/DL (ref 3.4–4.8)
ALP SERPL-CCNC: 93 U/L (ref 40–150)
ALT SERPL W P-5'-P-CCNC: 21 U/L (ref 8–55)
ANALYZER IN CARDIO: (no result)
ANION GAP SERPL CALC-SCNC: 14 MMOL/L (ref 10–20)
ANION GAP SERPL CALC-SCNC: 16 MMOL/L (ref 10–20)
APTT PPP: 60 SEC (ref 22.9–36.1)
AST SERPL-CCNC: 28 U/L (ref 5–34)
BASE EXCESS STD BLDA CALC-SCNC: -12.5 MEQ/L
BILIRUB SERPL-MCNC: 0.7 MG/DL (ref 0.2–1.2)
BUN SERPL-MCNC: 22 MG/DL (ref 9.8–20.1)
BUN SERPL-MCNC: 24 MG/DL (ref 9.8–20.1)
BURR CELLS BLD QL SMEAR: (no result) (100X)
CA-I BLDA-SCNC: 0.99 MMOL/L (ref 1.12–1.3)
CALCIUM SERPL-MCNC: 6.8 MG/DL (ref 7.8–10.44)
CALCIUM SERPL-MCNC: 7.1 MG/DL (ref 7.8–10.44)
CHLORIDE SERPL-SCNC: 116 MMOL/L (ref 98–107)
CHLORIDE SERPL-SCNC: 117 MMOL/L (ref 98–107)
CO2 SERPL-SCNC: 12 MMOL/L (ref 23–31)
CO2 SERPL-SCNC: 14 MMOL/L (ref 23–31)
CREAT CL PREDICTED SERPL C-G-VRATE: 86 ML/MIN (ref 70–130)
CREAT CL PREDICTED SERPL C-G-VRATE: 92 ML/MIN (ref 70–130)
D DIMER PPP FEU-MCNC: (no result) *MCG/ML (ref 0.27–0.43)
FIBRINOGEN PPP-MCNC: 374 MG/DL (ref 253–463)
FSP-QUALITATIVE: (no result)
FSP-SEMIQUANTITATIVE: (no result) MCG/ML (ref ?–5)
GLOBULIN SER CALC-MCNC: 2.4 G/DL (ref 2.4–3.5)
GLUCOSE SERPL-MCNC: 109 MG/DL (ref 83–110)
GLUCOSE SERPL-MCNC: 74 MG/DL (ref 83–110)
HCO3 BLDA-SCNC: 12 MEQ/L (ref 22–28)
HCT VFR BLDA CALC: 33 % (ref 36–47)
HGB BLD-MCNC: 10.3 G/DL (ref 12–16)
HGB BLD-MCNC: 10.7 G/DL (ref 12–16)
HGB BLDA-MCNC: 11.2 G/DL (ref 12–16)
INR PPP: 1.9
MAGNESIUM SERPL-MCNC: 2.4 MG/DL (ref 1.6–2.6)
MCH RBC QN AUTO: 31 PG (ref 27–31)
MCH RBC QN AUTO: 31.7 PG (ref 27–31)
MCV RBC AUTO: 96.4 FL (ref 78–98)
MCV RBC AUTO: 96.5 FL (ref 78–98)
MDIFF COMPLETE?: YES
MDIFF COMPLETE?: YES
PCO2 BLDA: 24.2 MMHG (ref 35–45)
PH BLDA: 7.31 [PH] (ref 7.35–7.45)
PLATELET # BLD AUTO: 100 THOU/UL (ref 130–400)
PLATELET # BLD AUTO: 84 THOU/UL (ref 130–400)
PO2 BLDA: 87.7 MMHG (ref 70–?)
POLYCHROMASIA BLD QL SMEAR: (no result) (100X)
POTASSIUM BLD-SCNC: 3.25 MMOL/L (ref 3.7–5.3)
POTASSIUM SERPL-SCNC: 3.3 MMOL/L (ref 3.5–5.1)
POTASSIUM SERPL-SCNC: 3.5 MMOL/L (ref 3.5–5.1)
PROTHROMBIN TIME: 21.9 SEC (ref 12–14.7)
RBC # BLD AUTO: 3.32 MILL/UL (ref 4.2–5.4)
RBC # BLD AUTO: 3.38 MILL/UL (ref 4.2–5.4)
SODIUM SERPL-SCNC: 141 MMOL/L (ref 136–145)
SODIUM SERPL-SCNC: 141 MMOL/L (ref 136–145)
SPECIMEN DRAWN FROM PATIENT: (no result)
WBC # BLD AUTO: 29.9 THOU/UL (ref 4.8–10.8)
WBC # BLD AUTO: 32.3 THOU/UL (ref 4.8–10.8)

## 2019-04-12 RX ADMIN — MEROPENEM AND SODIUM CHLORIDE SCH MLS: 1 INJECTION, SOLUTION INTRAVENOUS at 00:07

## 2019-04-12 RX ADMIN — MEROPENEM AND SODIUM CHLORIDE SCH MLS: 1 INJECTION, SOLUTION INTRAVENOUS at 08:36

## 2019-04-12 RX ADMIN — POTASSIUM CHLORIDE PRN MLS: 29.8 INJECTION, SOLUTION INTRAVENOUS at 08:22

## 2019-04-12 RX ADMIN — ALBUMIN HUMAN SCH GM: 250 SOLUTION INTRAVENOUS at 17:36

## 2019-04-12 RX ADMIN — MEROPENEM AND SODIUM CHLORIDE SCH MLS: 1 INJECTION, SOLUTION INTRAVENOUS at 17:30

## 2019-04-12 RX ADMIN — POTASSIUM CHLORIDE PRN MLS: 29.8 INJECTION, SOLUTION INTRAVENOUS at 17:36

## 2019-04-12 RX ADMIN — ALBUMIN HUMAN SCH GM: 250 SOLUTION INTRAVENOUS at 10:48

## 2019-04-12 NOTE — RAD
Exam:

Chest one view:



HISTORY:

Respiratory insufficiency





COMPARISON:

4/11/2019



FINDINGS:

Life support tubes in place. Monitor leads overlie the chest. Patchy increased markings bilaterally a
nd some mild bilateral vascular congestion and small bilateral pleural effusions, stable.



IMPRESSION:

Stable bilateral minimal pleural and parenchymal opacity changes. No significant new process.



Reported By: Mariusz Watson 

Electronically Signed:  4/12/2019 8:01 AM
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
## 2019-04-12 NOTE — PRG
DATE OF SERVICE:  04/12/2019



SUBJECTIVE:  Ms. Matute is clinically improved. 



Her Levophed dosing has decreased significantly overnight with volume 
resuscitation.

 She received additional 2 L saline bolus last night. 



She is on propofol, so I have asked the nurse to wean her off propofol and 
consider

using fentanyl instead. 



OBJECTIVE:  LUNGS:  Clear anteriorly. 

HEART:  Regular rhythm. 

ABDOMEN:  Soft. 

EXTREMITIES:  Without edema.



LABORATORY DATA:  White count is 29.9, hemoglobin 10.7, platelets have dropped 
to

100,000.  She has 37% bands on her peripheral smear still.  Sodium 141, 
potassium

3.5, chloride 117, bicarb 12, BUN 24, and creatinine 0.81. 



IMPRESSION:  Urinary tract sepsis.  She has grown 2/2 gram-negatives out of her

blood.  This has been identified as Escherichia coli.  Her urine is also growing

gram-negative. 



With a low albumin, adding __________ albumin may help wean completely off 
pressors. 



Her acid-base disorder is mixed now, some of this is secondary to sepsis.  Her 
pH is

7.31, CO2 of 24, PO2 87, some of this hyperchloremic. 



We will continue to follow.



Critical care time is 30 minutes.



Job ID:  [id number]



MTDD

## 2019-04-12 NOTE — PDOC.FM
- Subjective


Subjective: 





Yael Saleem seen at bedside this morning.  She is intubated and sedated on 

mechanical ventilation.  Settings: SIMV- FiO2 40%, RR 24, Vt 450, PEEP 5.  

Overnight, she became tachycardic in the 130s-140s.  She required additional 

fluid boluses and was started on vasopressin gtt.  Her HR has gone down since 

that time and her BPs have been adequately maintained.  Magnesium is critically 

low this morning and we are replacing. 





- Objective


MAR Reviewed: Yes


Vital Signs & Weight: 


 Vital Signs (12 hours)











  Temp Pulse Resp Pulse Ox


 


 04/12/19 06:00    24 H 


 


 04/12/19 04:00  100.1 F H   


 


 04/12/19 02:05   125 H  


 


 04/12/19 02:00    24 H 


 


 04/12/19 00:00  99.0 F   24 H 


 


 04/11/19 22:15   161 H  


 


 04/11/19 22:00    27 H 


 


 04/11/19 20:00  98.6 F   24 H  100


 


 04/11/19 18:38   144 H  








 Weight











Weight                         91.3 kg











 Most Recent Monitor Data











Heart Rate from ECG            122


 


NIBP                           101/82


 


NIBP BP-Mean                   88


 


Respiration from ECG           24


 


SpO2                           100














I&O: 


 











 04/10/19 04/11/19 04/12/19





 06:59 06:59 06:59


 


Intake Total   7158.4


 


Output Total   680


 


Balance   6478.4











Result Diagrams: 


 04/12/19 Unknown





 04/12/19 Unknown


Radiology Reviewed by me: Yes





Phys Exam





- Physical Examination


sedation and intubated on SIMV


HEENT: sclera anicteric


dry MMs


Neck: no JVD, supple, full ROM


Respiratory: no wheezing, no rales, no rhonchi, clear to auscultation bilateral


Cardiovascular: RRR, no significant murmur


Gastrointestinal: soft, no distention, positive bowel sounds


Musculoskeletal: no edema, pulses present


Deviation from normal: sedated


Skin: normal turgor, cap refill <2 seconds





Dx/Plan


(1) Sepsis


Code(s): A41.9 - SEPSIS, UNSPECIFIED ORGANISM   Status: Acute   





(2) Metabolic acidosis


Code(s): E87.2 - ACIDOSIS   Status: Acute   





(3) Hypokalemia


Code(s): E87.6 - HYPOKALEMIA   Status: Acute   





(4) Nephrolithiasis


Status: Acute   





(5) Sepsis


Code(s): A41.9 - SEPSIS, UNSPECIFIED ORGANISM   Status: Acute   





(6) UTI (urinary tract infection)


Status: Acute   


Qualifiers: 


 





(7) Chronic constipation


Code(s): K59.09 - OTHER CONSTIPATION   Status: Chronic   





(8) Dyslipidemia


Code(s): E78.5 - HYPERLIPIDEMIA, UNSPECIFIED   Status: Chronic   





(9) H/O traumatic brain injury


Code(s): Z87.820 - PERSONAL HISTORY OF TRAUMATIC BRAIN INJURY   Status: Chronic

   





(10) HTN (hypertension)


Code(s): I10 - ESSENTIAL (PRIMARY) HYPERTENSION   Status: Chronic   


Qualifiers: 


 





(11) Obesity (BMI 30.0-34.9)


Code(s): E66.9 - OBESITY, UNSPECIFIED   Status: Chronic   





(12) Nausea & vomiting


Code(s): R11.2 - NAUSEA WITH VOMITING, UNSPECIFIED   Status: Acute   


Qualifiers: 


   Vomiting type: unspecified 





(13) Iron deficiency anemia


Code(s): D50.9 - IRON DEFICIENCY ANEMIA, UNSPECIFIED   Status: Chronic   





(14) History of MI (myocardial infarction)


Code(s): I25.2 - OLD MYOCARDIAL INFARCTION   Status: Chronic   





- Plan


Plan: 





Sepsis Shock 2/2 Complicated UTI:


- CT evidence of obstructing stone with moderate hydronephrosis, WBC 16.4 with 

bandemia, Lactic acid 7.1, Vancomycin and Zosyn given in the ED, as well as 1.5 

mL NS, given 700 mL LR in OR. 


- s/p Right ureteral stent placed on 4/11/19 by Dr. Cheney


- Right radial arterial line (4/11/19) and Left external jugular CVC (4/11/19) 

placed


- Dr. Cheney consulted, appreciate recs


- Dr. Mehta consulted for CCU management, appreciate recs


- Continue meropenem for E. coli bacteremia


- Continue Levophed and vasotec gtt to maintain MAP > 65


- IV Tylenol for fever


- Urine cx pending, blood culture grew E. coli, sensitivities pending








Acute Respiratory Acidosis with concomitant Anion Gap Metabolic Acidosis:


2/2 #1


- Initial ABG: pH 7.162, pCO2 50.7, pO2 30.9


- Intubated on SIMV, FiO2 40%, Vt 450, RR 24, PEEP 5


- Morning ABG pending


- Dr. Mehta consulted for CCU management, appreciate recs





Complicated UTI:


- 9 mm and 6 mm calcification in the right ureter


- s/p right ureteral stent placement


- Dr. Cheney consulted, appreciate recs


- Continue vanc and meropenem


- Urine Culture pending





Hypokalemia:


- K on admission was 2.9


- 40 mEq KCl given in ED


- Replace and monitor





Hypomagnesemia


-  0.9 this morning


-  replacing and repeating Mg this afternoon





HTN:


- Hold home meds


- Currently on levophed and vasopressin gtt at 5 and 6, respectively. 





Hx of TBI and Dementia:


- Residual right sided paralysis


- Baseline mental status is oriented to person and place, not time or situation 


- Baseline functional status is bedbound, living at home with full time 

caregiver


- Consult case management for discharge planning








Addendum - Attending





- Attending Attestation


Date/Time: 04/12/19 1026





I personally evaluated the patient and discussed the management with Dr. Rojas.


I agree and repeated with the History, Examination, Assessment and Plan 

documented above with any addition or exceptions noted below.





Patient I&S and unable to complete ros.





Neuro: on propofol gtt, daily sedation vacations


Resp: SIMV, VC, dec TV if possible, plt 18 this AM


CV: tachy, regular, no murmur; MAP 80's, would d/c vaso and likely levo 

subsequently


FEN/GI: being feeds if not already on, ppi for ppx


Renal/: s/p stent, monitor UOP


Heme: t-penia, anticipate 2/2 sepsis, monitor


ID: cx positive, repeat sent, d/c vanc and continue tx


Skin: monitor for decub 2/2 immobility


Lines: must keep CVC pending HDS and improvement


Social: guarded prognosis, discussed with daughter at bedside.

## 2019-04-12 NOTE — PRG
DATE OF SERVICE:  04/12/2019



SUBJECTIVE:  This patient is still intubated in the ICU one day after cysto and

right stent placement done emergently for two obstructing right ureteral stones and

urinary tract sepsis.  Her blood cultures are showing an E coli, sensitivities are

pending.  Urine culture is showing gram-negative denilson.  She has been on Zosyn and

vancomycin.  I think the vancomycin could be discontinued.  She is still on blood

pressure support with Levophed.  It looks like her blood pressure has been

improving.  O2 saturations currently are good.  Her pulse rate has come down some,

but she is still tachycardic.  Her urine output has increased, it was not very good

yesterday, but it appears at least 60 mL an hour today according to the nurse, and

it is looking much clearer compared to what it looked like yesterday.  Her white

count is up to almost 30,000, hemoglobin is 10.7, platelet count is 100.  Creatinine

is 0.81.  She is still acidotic. 



PHYSICAL EXAMINATION:

Her right upper quadrant is  to deep palpation.  There is no obvious

mass.  She is not very responsive to her family according to her daughter who is

with her. 



IMPRESSION:  Pyelonephritis, sepsis, obstructing right ureteral stone with

pyonephrosis.  She is currently on antibiotic that hopefully will cover this.

Sensitivity should hopefully be back tomorrow.  Her stent was placed yesterday, so I

feel like her urinary tract is unobstructed and hopefully with her critical care in

the ICU and appropriate antibiotic, she will recover and bounce back from this.

Urology will continue to follow along with her.  I am away this weekend, but I will

check out to the urologist on-call and let them know of her being in the hospital. 







Job ID:  [id number]

## 2019-04-13 LAB
ANALYZER IN CARDIO: (no result)
ANION GAP SERPL CALC-SCNC: 14 MMOL/L (ref 10–20)
BASE EXCESS STD BLDA CALC-SCNC: -7.5 MEQ/L
BUN SERPL-MCNC: 17 MG/DL (ref 9.8–20.1)
CA-I BLDA-SCNC: 1.04 MMOL/L (ref 1.12–1.3)
CALCIUM SERPL-MCNC: 7.4 MG/DL (ref 7.8–10.44)
CHLORIDE SERPL-SCNC: 120 MMOL/L (ref 98–107)
CO2 SERPL-SCNC: 14 MMOL/L (ref 23–31)
CREAT CL PREDICTED SERPL C-G-VRATE: 119 ML/MIN (ref 70–130)
GLUCOSE SERPL-MCNC: 65 MG/DL (ref 83–110)
HCO3 BLDA-SCNC: 14.3 MEQ/L (ref 22–28)
HCT VFR BLDA CALC: 28 % (ref 36–47)
HGB BLD-MCNC: 9.2 G/DL (ref 12–16)
HGB BLDA-MCNC: 9.5 G/DL (ref 12–16)
MAGNESIUM SERPL-MCNC: 2.7 MG/DL (ref 1.6–2.6)
MCH RBC QN AUTO: 31.2 PG (ref 27–31)
MCV RBC AUTO: 94.9 FL (ref 78–98)
MDIFF COMPLETE?: YES
PCO2 BLDA: 19.4 MMHG (ref 35–45)
PH BLDA: 7.49 [PH] (ref 7.35–7.45)
PLATELET # BLD AUTO: 68 THOU/UL (ref 130–400)
PO2 BLDA: 121.2 MMHG (ref 70–?)
POTASSIUM BLD-SCNC: 2.88 MMOL/L (ref 3.7–5.3)
POTASSIUM SERPL-SCNC: 3.2 MMOL/L (ref 3.5–5.1)
RBC # BLD AUTO: 2.96 MILL/UL (ref 4.2–5.4)
SODIUM SERPL-SCNC: 145 MMOL/L (ref 136–145)
SPECIMEN DRAWN FROM PATIENT: (no result)
WBC # BLD AUTO: 26 THOU/UL (ref 4.8–10.8)

## 2019-04-13 RX ADMIN — ALBUMIN HUMAN SCH GM: 250 SOLUTION INTRAVENOUS at 00:50

## 2019-04-13 RX ADMIN — MEROPENEM AND SODIUM CHLORIDE SCH MLS: 1 INJECTION, SOLUTION INTRAVENOUS at 00:50

## 2019-04-13 RX ADMIN — MEROPENEM AND SODIUM CHLORIDE SCH MLS: 1 INJECTION, SOLUTION INTRAVENOUS at 09:37

## 2019-04-13 RX ADMIN — ALBUMIN HUMAN SCH GM: 250 SOLUTION INTRAVENOUS at 05:00

## 2019-04-13 RX ADMIN — MEROPENEM AND SODIUM CHLORIDE SCH MLS: 1 INJECTION, SOLUTION INTRAVENOUS at 16:12

## 2019-04-13 RX ADMIN — ALBUMIN HUMAN SCH GM: 250 SOLUTION INTRAVENOUS at 11:35

## 2019-04-13 NOTE — PRG
DATE OF SERVICE:  04/13/2019



SUBJECTIVE:  The patient is currently off pressors.  She is still intubated and

still requiring ventilatory assistance.  Per Dr. Bernardo, possible extubation may be

tomorrow depending on how she is doing.  She is otherwise stable and has not had any

further issues and is continuing to produce urine. 



OBJECTIVE:  VITAL SIGNS:  Temperature 97.9, pulse 86, respirations 12 on the

ventilator, blood pressure 130/79, and saturations 100%. 

GENERAL:  Intubated and sedated.  Does not respond to verbal stimuli. 

HEENT:  ET tube in place. 

CARDIOVASCULAR:  Regular rate and rhythm. 

ABDOMEN:  Soft, nontender, and nondistended.  Positive bowel sounds. 

:  Tellez catheter in place with clear yellow urine. 

EXTREMITIES:  SCDs in place.



ASSESSMENT AND PLAN:  A 73-year-old white female with urosepsis secondary to a

ureteral stone status post ureteral stent placement by Dr. Vegas two days ago.  The

patient is recovering and improving with ability to come off pressors with

maintenance of her blood pressure.  Her mental status is still not quite back to

normal and she is requiring ventilator assistance.  Therefore, she will probably

need to remain intubated for a while longer.  Dr. Bernardo is following over the weekend

and will continue to offer recommendations from a ventilator assisting standpoint.

From my standpoint, I would recommend continuation of antibiotics and IV fluids as

needed.  No definitive stone management obviously until the patient is much more

stable and has had complete resolution of urinary infection in the blood and urine.

We will continue to follow. 







Job ID:  [id number]

## 2019-04-13 NOTE — RAD
CHEST 1 VIEW:

 

Date:  04/13/19 

 

INDICATION:

History of intubation. 

 

COMPARISON:  

Prior exam dated 04/12/19. 

 

FINDINGS:

ET tube, gastric catheter, and left subclavian central venous catheter are unchanged. Bibasilar pleur
al parenchymal opacities persist. Mild cardiomegaly is stable. No pneumothorax is evident. 

 

IMPRESSION: 

Stable exam. 

 

 

POS: BH

## 2019-04-13 NOTE — PDOC.FM
- Subjective


Subjective: 





72 yo female day 2 s/p stent placement for urosepsis. Pt is still intubated, 

not requiring levophed since approx 5am today with BP stable. Propofol 10, 

mental status during sedation vacation this AM improving, but not fully 

appropriate for extubation. 





- Objective


MAR Reviewed: Yes


Vital Signs & Weight: 


 Vital Signs (12 hours)











  Temp Pulse Resp BP Pulse Ox


 


 04/13/19 07:42   77   138/70 


 


 04/13/19 07:26    24 H   100


 


 04/13/19 07:00  97.7 F    


 


 04/13/19 06:00    24 H  


 


 04/13/19 04:00  99.1 F   24 H  


 


 04/13/19 02:28   94   


 


 04/13/19 02:00    24 H  


 


 04/13/19 00:00  98.7 F   24 H  


 


 04/12/19 22:37   94   


 


 04/12/19 22:00    24 H  








 Weight











Admit Weight                   91.172 kg


 


Weight                         94.8 kg











 Most Recent Monitor Data











Heart Rate from ECG            82


 


NIBP                           134/82


 


NIBP BP-Mean                   99


 


Respiration from ECG           0


 


SpO2                           98














I&O: 


 











 04/12/19 04/13/19 04/14/19





 06:59 06:59 06:59


 


Intake Total 7158.4 4003.1 30


 


Output Total 680 2630 115


 


Balance 6478.4 1373.1 -85











Result Diagrams: 


 04/13/19 04:00





 04/13/19 04:00





Phys Exam





- Physical Examination


intubated


Respiratory: no wheezing


Cardiovascular: RRR


Gastrointestinal: soft


Musculoskeletal: no edema


sedated for intubation





Dx/Plan


(1) Hypokalemia


Code(s): E87.6 - HYPOKALEMIA   Status: Acute   





(2) Metabolic acidosis


Code(s): E87.2 - ACIDOSIS   Status: Acute   





(3) Nausea & vomiting


Code(s): R11.2 - NAUSEA WITH VOMITING, UNSPECIFIED   Status: Acute   


Qualifiers: 


   Vomiting type: unspecified 





(4) Nephrolithiasis


Status: Acute   





(5) Sepsis


Code(s): A41.9 - SEPSIS, UNSPECIFIED ORGANISM   Status: Acute   





(6) UTI (urinary tract infection)


Status: Acute   


Qualifiers: 


 





(7) H/O traumatic brain injury


Code(s): Z87.820 - PERSONAL HISTORY OF TRAUMATIC BRAIN INJURY   Status: Chronic

   





(8) HTN (hypertension)


Code(s): I10 - ESSENTIAL (PRIMARY) HYPERTENSION   Status: Chronic   


Qualifiers: 


 





- Plan


Plan: 





#septic shock 2/2 complicated UTI


-s/p right stent placed by Dr. Cheeny on 4/11


-right arterial line and left external jugular central line placed also on 4/11


-patient currently on meropenem


-urine and blood cultures grew Ecoli sensitive to meropenem


-Urology and Pulmonology recs appreciated


-no longer on levophed, continue to monitor VS


-propofol for sedation





#Hypokalemia


-mildly low today


-replace and monitor daily





#hypomagnesemia


-mildly elevated today


-continue to monitor





#HTN





#Hx of TBI and dementia





#hyperchloremia and hypernatremia


-switch to 





Addendum - Attending





- Attending Attestation


Date/Time: 04/13/19 1294





I personally evaluated the patient and discussed the management with Dr. Peters.


I agree with the History, Examination, Assessment and Plan documented above 

with any addition or exceptions noted below.


Septic shock from Ecoli bacteremia from complicated UTI and obstructing right 

nephrolithiasis- continue IVF, IV meropenem.


Metabolic acidosis- lactate normalized. Continue vent management per 

pulmonology.


Thrombocytopenia- presumed from septic shock. Hold lovenox and trend daily.  No 

active bleeding at this time.

## 2019-04-13 NOTE — PRG
DATE OF SERVICE:  04/13/2019



SUBJECTIVE:  This morning, remains intubated on the vent on Diprivan.  She is

sedated.  Chest x-ray shows a left-sided infiltrate.  Opens her eyes. 



OBJECTIVE:  VITAL SIGNS:  Blood pressure is 138/70, pulse 77, respirations 24.

Afebrile.  I's and O's have been good. 

CHEST:  Bilateral rhonchi. 

CARDIAC:  Sinus tach. 

ABDOMEN:  Soft.



LABORATORY DATA:  PO2 is 121, pCO2 90, pH 7.49, 24% on a rate of 30.  White count of

26,000, H and H 9 and 28, platelet count is low at 68. 



Fibrinogen level is normal.  Fibrinogen 374. 



Lytes are normal.



IMPRESSION:  Escherichia coli sepsis, respiratory failure, left-sided pneumonia,

morbid obesity. 



She is sensitive to the present antibiotic on board, which we will continue.

Meropenem.  Pressors.  She is not weanable.  The vent is being adjusted.  Start

nutrition, PT. 



One-half hour of critical time.







Job ID:  [id number]

## 2019-04-14 LAB
ANALYZER IN CARDIO: (no result)
ANION GAP SERPL CALC-SCNC: 10 MMOL/L (ref 10–20)
BASE EXCESS STD BLDA CALC-SCNC: -8 MEQ/L
BUN SERPL-MCNC: 13 MG/DL (ref 9.8–20.1)
CA-I BLDA-SCNC: 1.1 MMOL/L (ref 1.12–1.3)
CALCIUM SERPL-MCNC: 7.5 MG/DL (ref 7.8–10.44)
CHLORIDE SERPL-SCNC: 120 MMOL/L (ref 98–107)
CO2 SERPL-SCNC: 18 MMOL/L (ref 23–31)
CREAT CL PREDICTED SERPL C-G-VRATE: 124 ML/MIN (ref 70–130)
GLUCOSE SERPL-MCNC: 94 MG/DL (ref 83–110)
HCO3 BLDA-SCNC: 16 MEQ/L (ref 22–28)
HCT VFR BLDA CALC: 30 % (ref 36–47)
HGB BLD-MCNC: 9.7 G/DL (ref 12–16)
HGB BLDA-MCNC: 10.2 G/DL (ref 12–16)
MCH RBC QN AUTO: 30.8 PG (ref 27–31)
MCV RBC AUTO: 95.3 FL (ref 78–98)
MDIFF COMPLETE?: YES
O2 A-A PPRESDIFF RESPIRATORY: 49.38 MM[HG] (ref 0–20)
PCO2 BLDA: 27.9 MMHG (ref 35–45)
PH BLDA: 7.38 [PH] (ref 7.35–7.45)
PLATELET # BLD AUTO: 58 THOU/UL (ref 130–400)
PO2 BLDA: 94 MMHG (ref 70–?)
POTASSIUM BLD-SCNC: 3.21 MMOL/L (ref 3.7–5.3)
POTASSIUM SERPL-SCNC: 3.4 MMOL/L (ref 3.5–5.1)
RBC # BLD AUTO: 3.14 MILL/UL (ref 4.2–5.4)
SODIUM SERPL-SCNC: 145 MMOL/L (ref 136–145)
SPECIMEN DRAWN FROM PATIENT: (no result)
TOXIC GRANULES BLD QL SMEAR: SLIGHT
WBC # BLD AUTO: 25.1 THOU/UL (ref 4.8–10.8)

## 2019-04-14 RX ADMIN — MEROPENEM AND SODIUM CHLORIDE SCH MLS: 1 INJECTION, SOLUTION INTRAVENOUS at 09:03

## 2019-04-14 RX ADMIN — MEROPENEM AND SODIUM CHLORIDE SCH MLS: 1 INJECTION, SOLUTION INTRAVENOUS at 16:51

## 2019-04-14 RX ADMIN — MEROPENEM AND SODIUM CHLORIDE SCH MLS: 1 INJECTION, SOLUTION INTRAVENOUS at 00:08

## 2019-04-14 NOTE — PRG
DATE OF SERVICE:  04/14/2019



SUBJECTIVE:  Yael Matute remains intubated on the vent with Diprivan.



OBJECTIVE:  VITAL SIGNS:  Blood pressure 155/76, pulse 68, respiratory rate 18.  She

is afebrile.  I's and O's have been good. 

CHEST:  Extensive rhonchi and crackles. 

CARDIAC:  Sinus tach. 

ABDOMEN:  Soft without masses.



LABORATORY DATA:  White count is not obtained today.  E. coli sensitive to present

antibiotic.  PO2 is 94, pCO2 __________ on a rate of 12, 25%, 450 tidal volume. 



ASSESSMENT:  

1. Escherichia coli sepsis.

2. Respiratory failure.

3. Morbid obesity.

4. Encephalopathy.



PLAN:  

1. __________ minimize sedation.  Continue meropenem, neb treatments, supportive

care. 

2. Start weaning in the next 24 to 48 hours.  Chest x-ray incidentally shows no

obvious infiltrates. 







Job ID:  [id number]

## 2019-04-14 NOTE — RAD
CHEST 1 VIEW:

 

Date:  04/14/19 

 

INDICATION:

Intubation. 

 

COMPARISON:  

Prior exam dated 04/13/19. 

 

FINDINGS:

There is mild cardiomegaly and pulmonary vascular congestion. The patient remains intubated with asso
ciated left subclavian central venous catheter and gastric catheter. These are unchanged. No pneumoth
orax is evident. 

 

IMPRESSION: 

Stable exam. 

 

 

POS: BH

## 2019-04-14 NOTE — PDOC.FM
- Subjective


Subjective: 





Patient stable overnight. Still intubated, but not requiring levophed for MAP 

stabilization.  Settings: SIMV- FiO2 25%, RR 14, Vt 450, PEEP 5.  








- Objective


MAR Reviewed: Yes


Vital Signs & Weight: 


 Vital Signs (12 hours)











  Temp Pulse Resp Pulse Ox


 


 04/14/19 04:00  98.2 F   16 


 


 04/14/19 02:57   71  


 


 04/14/19 02:00    15 


 


 04/14/19 00:00  99.7 F H   24 H 


 


 04/13/19 23:55   80  


 


 04/13/19 22:00    20 


 


 04/13/19 20:00  99.3 F   21 H  98


 


 04/13/19 19:04   83  








 Weight











Admit Weight                   91.172 kg


 


Weight                         93.8 kg











 Most Recent Monitor Data











Heart Rate from ECG            72


 


NIBP                           147/83


 


NIBP BP-Mean                   104


 


Respiration from ECG           18


 


SpO2                           100














I&O: 


 











 04/13/19 04/14/19 04/15/19





 06:59 06:59 06:59


 


Intake Total 4003.1 3257.2 


 


Output Total 2630 1535 


 


Balance 1373.1 1722.2 











Result Diagrams: 


 04/14/19 06:00





 04/14/19 06:00





Phys Exam





- Physical Examination


intubated


Neck: no JVD


Respiratory: no wheezing, clear to auscultation bilateral


Cardiovascular: RRR, no significant murmur


Gastrointestinal: soft


Musculoskeletal: pulses present


Skin: cap refill <2 seconds





Dx/Plan


(1) Hypokalemia


Code(s): E87.6 - HYPOKALEMIA   Status: Acute   





(2) Metabolic acidosis


Code(s): E87.2 - ACIDOSIS   Status: Acute   





(3) Nausea & vomiting


Code(s): R11.2 - NAUSEA WITH VOMITING, UNSPECIFIED   Status: Acute   


Qualifiers: 


   Vomiting type: unspecified 





(4) Nephrolithiasis


Status: Acute   





(5) Sepsis


Code(s): A41.9 - SEPSIS, UNSPECIFIED ORGANISM   Status: Acute   





(6) UTI (urinary tract infection)


Status: Acute   


Qualifiers: 


 





(7) H/O traumatic brain injury


Code(s): Z87.820 - PERSONAL HISTORY OF TRAUMATIC BRAIN INJURY   Status: Chronic

   





(8) HTN (hypertension)


Code(s): I10 - ESSENTIAL (PRIMARY) HYPERTENSION   Status: Chronic   


Qualifiers: 


 





(9) Thrombocytopenia


Code(s): D69.6 - THROMBOCYTOPENIA, UNSPECIFIED   Status: Acute   





- Plan


Plan: 





#septic shock 2/2 complicated UTI


-s/p right stent placed by Dr. Cheney on 4/11


-right arterial line and left external jugular central line placed also on 4/11


-patient currently on meropenem


-urine and blood cultures grew Ecoli sensitive to meropenem


-Urology and Pulmonology recs appreciated


-no longer on levophed, continue to monitor VS


-propofol for sedation





#Hypokalemia


-mildly low today


-replace and monitor daily





#hypomagnesemia


-mildly elevated today


-continue to monitor





#HTN





#Hx of TBI and dementia





#hyperchloremia


-stable





#thrombocytopenia


-lovenox held yesterday


-continue to hold today if plts continue to be low


-AM CBC not resulted yet





Addendum - Attending





- Attending Attestation


Date/Time: 04/14/19 0481





I personally evaluated the patient and discussed the management with Dr. Peters


I agree with the History, Examination, Assessment and Plan documented above 

with any addition or exceptions noted below.


septic shock secondary to ecoli bacteremia from obstructive nephrolithiasis and 

pyelo- off pressors for >24 hours.  COntinue meropenem.


Respiratory failure- wean per pulm


HypoKalemia- replace


Thrombocytopenia- from sepsis- mild decrease but still >50k and no active 

bleeding.

## 2019-04-15 LAB
ANALYZER IN CARDIO: (no result)
ANION GAP SERPL CALC-SCNC: 8 MMOL/L (ref 10–20)
BASE EXCESS STD BLDA CALC-SCNC: -1.1 MEQ/L
BUN SERPL-MCNC: 10 MG/DL (ref 9.8–20.1)
CA-I BLDA-SCNC: 1.11 MMOL/L (ref 1.12–1.3)
CALCIUM SERPL-MCNC: 7.6 MG/DL (ref 7.8–10.44)
CHLORIDE SERPL-SCNC: 119 MMOL/L (ref 98–107)
CO2 SERPL-SCNC: 23 MMOL/L (ref 23–31)
CREAT CL PREDICTED SERPL C-G-VRATE: 144 ML/MIN (ref 70–130)
GLUCOSE SERPL-MCNC: 124 MG/DL (ref 83–110)
HCO3 BLDA-SCNC: 22.3 MEQ/L (ref 22–28)
HCT VFR BLDA CALC: 30 % (ref 36–47)
HGB BLD-MCNC: 9.9 G/DL (ref 12–16)
HGB BLDA-MCNC: 10.2 G/DL (ref 12–16)
MCH RBC QN AUTO: 31.2 PG (ref 27–31)
MCV RBC AUTO: 95.5 FL (ref 78–98)
MDIFF COMPLETE?: YES
PCO2 BLDA: 32.6 MMHG (ref 35–45)
PH BLDA: 7.45 [PH] (ref 7.35–7.45)
PLATELET # BLD AUTO: 66 THOU/UL (ref 130–400)
PO2 BLDA: 87 MMHG (ref 70–?)
POTASSIUM BLD-SCNC: 3.25 MMOL/L (ref 3.7–5.3)
POTASSIUM SERPL-SCNC: 3 MMOL/L (ref 3.5–5.1)
RBC # BLD AUTO: 3.17 MILL/UL (ref 4.2–5.4)
SODIUM SERPL-SCNC: 147 MMOL/L (ref 136–145)
SPECIMEN DRAWN FROM PATIENT: (no result)
WBC # BLD AUTO: 12.3 THOU/UL (ref 4.8–10.8)

## 2019-04-15 RX ADMIN — MEROPENEM AND SODIUM CHLORIDE SCH MLS: 1 INJECTION, SOLUTION INTRAVENOUS at 00:29

## 2019-04-15 RX ADMIN — MEROPENEM AND SODIUM CHLORIDE SCH MLS: 1 INJECTION, SOLUTION INTRAVENOUS at 16:48

## 2019-04-15 RX ADMIN — POTASSIUM CHLORIDE PRN MLS: 29.8 INJECTION, SOLUTION INTRAVENOUS at 06:32

## 2019-04-15 RX ADMIN — MEROPENEM AND SODIUM CHLORIDE SCH MLS: 1 INJECTION, SOLUTION INTRAVENOUS at 08:46

## 2019-04-15 NOTE — EKG
Test Reason : STAT

Blood Pressure : ***/*** mmHG

Vent. Rate : 144 BPM     Atrial Rate : 144 BPM

   P-R Int : 164 ms          QRS Dur : 086 ms

    QT Int : 258 ms       P-R-T Axes : -10 -44 113 degrees

   QTc Int : 399 ms

 

Sinus tachycardia . Atrial flutter is more likely.

Left axis deviation

Inferior infarct , age undetermined

Possible Anterior infarct , age undetermined

Lateral injury pattern

** ** ACUTE MI / STEMI ** **

Abnormal ECG

When compared with ECG of 22-JAN-2019 11:40,

Premature ventricular complexes are no longer Present

ST elevation has replaced ST depression in Lateral leads

T wave inversion more evident in Lateral leads

Confirmed by MARISELA MCGOVERN ([id number]) on 4/15/2019 10:29:29 PM

 

Referred By:  TREY           Confirmed By:MARISELA MCGOVERN

## 2019-04-15 NOTE — RAD
PORTABLE CHEST:

 

HISTORY:

Respiratory distress.

 

COMPARISON:

Prior day's exam.

 

FINDINGS:

Endotracheal and NG tubes are in satisfactory position.  Left subclavian line is unchanged.  Increase
d density in the base, slightly worsened as compared to the prior exam, suggesting some increasing at
electasis.  There could be associated effusion.

 

IMPRESSION:

Bibasilar pleural and parenchymal lung changes, slightly worsened as compared to the prior study.

 

POS: [facility]
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
## 2019-04-16 LAB
ANALYZER IN CARDIO: (no result)
ANION GAP SERPL CALC-SCNC: 9 MMOL/L (ref 10–20)
BASE EXCESS STD BLDA CALC-SCNC: 2.9 MEQ/L
BUN SERPL-MCNC: 8 MG/DL (ref 9.8–20.1)
CA-I BLDA-SCNC: 1.09 MMOL/L (ref 1.12–1.3)
CALCIUM SERPL-MCNC: 7.7 MG/DL (ref 7.8–10.44)
CHLORIDE SERPL-SCNC: 113 MMOL/L (ref 98–107)
CO2 SERPL-SCNC: 28 MMOL/L (ref 23–31)
CREAT CL PREDICTED SERPL C-G-VRATE: 169 ML/MIN (ref 70–130)
GLUCOSE SERPL-MCNC: 122 MG/DL (ref 83–110)
HCO3 BLDA-SCNC: 26.1 MEQ/L (ref 22–28)
HCT VFR BLDA CALC: 32 % (ref 36–47)
HGB BLD-MCNC: 10.2 G/DL (ref 12–16)
HGB BLDA-MCNC: 10.8 G/DL (ref 12–16)
MCH RBC QN AUTO: 31.2 PG (ref 27–31)
MCV RBC AUTO: 94.8 FL (ref 78–98)
MDIFF COMPLETE?: YES
PCO2 BLDA: 34.8 MMHG (ref 35–45)
PH BLDA: 7.49 [PH] (ref 7.35–7.45)
PLATELET # BLD AUTO: 99 THOU/UL (ref 130–400)
PO2 BLDA: 77 MMHG (ref 70–?)
POTASSIUM BLD-SCNC: 3.3 MMOL/L (ref 3.7–5.3)
POTASSIUM SERPL-SCNC: 3.1 MMOL/L (ref 3.5–5.1)
RBC # BLD AUTO: 3.26 MILL/UL (ref 4.2–5.4)
SODIUM SERPL-SCNC: 147 MMOL/L (ref 136–145)
SPECIMEN DRAWN FROM PATIENT: (no result)
WBC # BLD AUTO: 7.1 THOU/UL (ref 4.8–10.8)

## 2019-04-16 RX ADMIN — MEROPENEM AND SODIUM CHLORIDE SCH MLS: 1 INJECTION, SOLUTION INTRAVENOUS at 09:45

## 2019-04-16 RX ADMIN — MEROPENEM AND SODIUM CHLORIDE SCH MLS: 1 INJECTION, SOLUTION INTRAVENOUS at 01:53

## 2019-04-16 RX ADMIN — POTASSIUM CHLORIDE PRN MLS: 29.8 INJECTION, SOLUTION INTRAVENOUS at 06:18

## 2019-04-16 RX ADMIN — MEROPENEM AND SODIUM CHLORIDE SCH MLS: 1 INJECTION, SOLUTION INTRAVENOUS at 18:33

## 2019-04-16 NOTE — RAD
Exam:

Chest one view portable:



HISTORY:

Reconfirmation of central line placement position



COMPARISON:

4/16/2019, 3:58 AM



FINDINGS:

Monitor leads and NG tube and endotracheal tube have been removed or repositioned. Poor inspiratory e
ffort. Bilateral vascular congestion with minimal interstitial changes in the mid and lower lung

zones possibly mild vascular congestion with costophrenic angle blunting suggesting small pleural eff
usions. Left central line tip appears to be at the junction of the superior vena cava and left

innominate vein. No pneumothorax.



IMPRESSION:

Left subclavian catheter with the tip at the level of the superior vena cava and left innominate vein
. Bilateral vascular congestion and probable small pleural effusions. Continued short-term

follow-up.



Reported By: Mariusz Watson 

Electronically Signed:  4/16/2019 7:32 PM
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
## 2019-04-16 NOTE — RAD
SINGLE VIEW OF THE CHEST:

 

Comparison: 4-15-19

 

History: Ventilated patient with respiratory failure. 

 

FINDINGS: 

Single view of the chest shows normal sized cardiomediastinal silhouette. The lines and tubes are unc
hanged in position. There are small bilateral pleural effusions. Degenerative changes are seen in the
 spine. 

 

IMPRESSION: 

Stable exam. 

 

POS: [facility]

## 2019-04-16 NOTE — PRG
DATE OF SERVICE:  04/16/2019



SUBJECTIVE:  Yael Matute was evaluated.  She is awake and much more alert than she

was yesterday. 



Her minute volume was 8 L a minute.  She passed a leak test. 

She is in no distress on 5 of pressure support, 5 of PEEP with her IMV turned off.



OBJECTIVE:  VITAL SIGNS:  Heart rate was in the 90s. 

LUNGS:  Clear. 

HEART:  Regular rhythm.  S1, S2 normal. 

ABDOMEN:  Soft. 

EXTREMITIES:  Without edema or asymmetry.



LABORATORY DATA:  White count 7.1, hemoglobin 10.2, platelets 99,000.  Sodium 147,

potassium 3.1, chloride 113, bicarb 28, BUN 8, and creatinine 0.44. 



Intake and output, negative 2848. 



She was given 60 mg of Lasix IV this morning 30 minutes prior to extubation.  Her

chest radiograph reviewed by me showed a left effusion and mild interstitial edema. 



She subsequently has been extubated and has been reasonably comfortable.  Her

premorbid muscle weakness and deconditioning will be a factor in recovery from this

illness.  She will remain in the critical care unit for now. 



CRITICAL CARE TIME:  30 minutes.







Job ID:  [id number]

## 2019-04-17 LAB
ANION GAP SERPL CALC-SCNC: 9 MMOL/L (ref 10–20)
BUN SERPL-MCNC: 9 MG/DL (ref 9.8–20.1)
CALCIUM SERPL-MCNC: 7.9 MG/DL (ref 7.8–10.44)
CHLORIDE SERPL-SCNC: 104 MMOL/L (ref 98–107)
CO2 SERPL-SCNC: 32 MMOL/L (ref 23–31)
CREAT CL PREDICTED SERPL C-G-VRATE: 142 ML/MIN (ref 70–130)
GLUCOSE SERPL-MCNC: 76 MG/DL (ref 83–110)
HGB BLD-MCNC: 10.3 G/DL (ref 12–16)
MCH RBC QN AUTO: 31.4 PG (ref 27–31)
MCV RBC AUTO: 94.1 FL (ref 78–98)
MDIFF COMPLETE?: YES
PLATELET # BLD AUTO: 168 THOU/UL (ref 130–400)
POTASSIUM SERPL-SCNC: 3 MMOL/L (ref 3.5–5.1)
RBC # BLD AUTO: 3.29 MILL/UL (ref 4.2–5.4)
SODIUM SERPL-SCNC: 142 MMOL/L (ref 136–145)
WBC # BLD AUTO: 7.9 THOU/UL (ref 4.8–10.8)

## 2019-04-17 RX ADMIN — CEFTRIAXONE SCH MLS: 2 INJECTION, POWDER, FOR SOLUTION INTRAMUSCULAR; INTRAVENOUS at 07:54

## 2019-04-17 RX ADMIN — POTASSIUM CHLORIDE PRN MLS: 29.8 INJECTION, SOLUTION INTRAVENOUS at 06:34

## 2019-04-17 RX ADMIN — MEROPENEM AND SODIUM CHLORIDE SCH MLS: 1 INJECTION, SOLUTION INTRAVENOUS at 00:56

## 2019-04-17 NOTE — RAD
EXAM:

XR Chest 1 View Portable



PROVIDED CLINICAL HISTORY:

On ventilator. Follow-up evaluation.



COMPARISON:

4/16/2019



FINDINGS:

Left subclavian central venous catheter Is again noted in place with tip overlying the proximal SVC. 
Cardiac silhouette is magnified by projection but stable in size. Pulmonary vasculature appears to

be within normal limits. Linear and patchy density is again seen at the left lung base which could be
 related to either atelectasis or developing pneumonia. Minimal linear and patchy densities are

also seen at the right lung base. Suggestion of tiny bilateral pleural effusions. No other interval c
hange.



IMPRESSION:



1. Left subclavian central venous catheter noted in place with tip overlying the region of the SVC pr
oximally.

2. Small bilateral pleural effusions.

3. Increased interstitial and patchy density at the left lung base which may be related to atelectasi
s or developing pneumonia. Follow-up to complete resolution is recommended.



Reported By: Aquilino James 

Electronically Signed:  4/17/2019 7:56 AM
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
## 2019-04-17 NOTE — PDOC.FM
- Subjective


Subjective: 





72 yo female seen at bedside this AM. Patient is A&O x2 this AM. Patient was 

extubed yesterday morning. Patient is sitting up in a Neuro chair this AM. 

Patient states she feels well without any pain. She has no other complaints.





Nursing staff reports that she had a good night and has not had any episodes of 

dyspnea or tachypnea. Patient has been aggressively diuresed yesterday with 

good response of urine output. No other history is able to be obtained. 





- Objective


Vital Signs & Weight: 


 Vital Signs (12 hours)











  Temp Pulse Resp Pulse Ox


 


 04/17/19 04:00  98.5 F   


 


 04/17/19 00:00  98.2 F   


 


 04/16/19 23:10   88  20  100


 


 04/16/19 20:00  98.6 F    99


 


 04/16/19 18:24   96  24 H  100








 Weight











Admit Weight                   91.172 kg


 


Weight                         88.8 kg











 Most Recent Monitor Data











Heart Rate from ECG            87


 


NIBP                           123/58


 


NIBP BP-Mean                   79


 


Respiration from ECG           32


 


SpO2                           95














I&O: 


 











 04/15/19 04/16/19 04/17/19





 06:59 06:59 06:59


 


Intake Total 3443.3 3631.8 2603


 


Output Total 2875 6480 4050


 


Balance 568.3 -2848.2 -1447











Result Diagrams: 


 04/17/19 05:10





 04/17/19 05:10





Phys Exam





- Physical Examination


Constitutional: NAD


HEENT: moist MMs


Respiratory: no wheezing, clear to auscultation bilateral


Cardiovascular: RRR, no significant murmur


Gastrointestinal: soft, non-tender, no distention, positive bowel sounds


Supportive boots in place 


Neurological: normal sensation


Does not move RUE or RLE


Deviation from normal: A&O x 2


Skin: no rash





Dx/Plan


(1) Acute respiratory failure with hypoxia


Code(s): J96.01 - ACUTE RESPIRATORY FAILURE WITH HYPOXIA   Status: Acute   





(2) Sepsis


Code(s): A41.9 - SEPSIS, UNSPECIFIED ORGANISM   Status: Resolved   





(3) Nephrolithiasis


Status: Acute   





(4) UTI (urinary tract infection)


Status: Acute   


Qualifiers: 


 





(5) Hypokalemia


Code(s): E87.6 - HYPOKALEMIA   Status: Acute   





(6) Thrombocytopenia


Code(s): D69.6 - THROMBOCYTOPENIA, UNSPECIFIED   Status: Acute   





(7) Hypernatremia


Code(s): E87.0 - HYPEROSMOLALITY AND HYPERNATREMIA   Status: Acute   





(8) Right sided weakness


Code(s): R53.1 - WEAKNESS   Status: Acute   





(9) HTN (hypertension)


Code(s): I10 - ESSENTIAL (PRIMARY) HYPERTENSION   Status: Chronic   


Qualifiers: 


 





- Plan


Plan: 





#Acute hypoxic respiratory failure


- intubated 4/11


- Extubated 4/16


- Currently on 2L NC





#septic shock 2/2 complicated UTI from obstructing nephrolithiasis


- Septic shock resolved 


- s/p right stent placed by Dr. Vegas on 4/11


- right arterial line and left external jugular central line placed also on 4/11


- patient currently on meropenem


- urine and blood cultures grew Ecoli sensitive to meropenem and Ceftriaxone


- Meropenem discontinued 4/17 and Ceftriaxone initiated 4/17


- Will consider de-escalating therapy in coordination with Dr. Vegas. 


- Urology and Pulmonology recs appreciated





#Fluid overload


- Likely due to fluid resuscitation from sepsis


- I&Os show net -1447 in last 24 hours 


- IV lasix as needed





#Hypokalemia


- 3.0 today


- replace and monitor daily





#Hypernatremia


- resolved 





#hyperchloremia


- improved  





#HTN


- Held home BP meds due to above


- Restart when appropriate





#thrombocytopenia


- 168 this AM





#Right sided weakness


- Per daughter present for 5 years. Sensation intact, but motor function not.


- Due to patient condition, daughter is agreeable to short term placement for 

rehab/SNF until her mother's strength improved. Case Management consulted.





Disposition: Stable, will coordinate care. Patient likely stable for transfer 

to medical floor.  





Addendum - Attending





- Attending Attestation


Date/Time: 04/17/19 0001





I personally evaluated the patient and discussed the management with Dr. Vegas


I agree with the History, Examination, Assessment and Plan documented above 

with any addition or exceptions noted below.





72 yo female with history of recurrent UTIs admitted for sepsis


HD #6


Patient successfully extubated on 4/16/19. No respiratory issues last night. 

Patient denies pain or SOB. Able to tolerated liquids. BM x 3 yesterday. Li 

in place. Sitting up in chair. 


VS reviewed. Labs reviewed. Imaging reviewed.


Agree with PE as documented. 





1. Septic shock: Resolved. Cultures reviewed. Antibiotics de-escalated. Monitor 

I/Os closely. Procal 1.68. Repeat in AM. Trend. 


2. Complicated UTI: Obstructing right nephrolithiasis s/p stent. Uro following. 

Cultures reviewed - E. coli and presumptive Enterococcus. Sensitivities 

reviewed. Remove li today. 


3. Bacteremia: Cultures reviewed. E. coli. Would treat for total of 14 days. 

Trend procal to assist with treatment duration. 


4. Respiratory Failure: Resolved. Off vent 24 hours.  


5. Electrolyte abnormalities: Continue to trend labs. Replace per protocol. 

Stop IVFs. Patient tolerating PO now. 


6. Thrombocytopenia: Likely related to infection and stress. No evidence of 

HIT. Restart Lovenox. 


7. 3rd spacing: Continue diuresis. Stop IVFs.


8. Constipation: Continue stool softner. BM x 3. 





Dispo: Transfer to floor. Remove li. Continue gentle diuresis. 





Paulette

## 2019-04-18 LAB
ANION GAP SERPL CALC-SCNC: 12 MMOL/L (ref 10–20)
BUN SERPL-MCNC: 8 MG/DL (ref 9.8–20.1)
CALCIUM SERPL-MCNC: 8.3 MG/DL (ref 7.8–10.44)
CHLORIDE SERPL-SCNC: 108 MMOL/L (ref 98–107)
CO2 SERPL-SCNC: 28 MMOL/L (ref 23–31)
CREAT CL PREDICTED SERPL C-G-VRATE: 135 ML/MIN (ref 70–130)
GLUCOSE SERPL-MCNC: 97 MG/DL (ref 83–110)
HGB BLD-MCNC: 10.7 G/DL (ref 12–16)
MCH RBC QN AUTO: 30.7 PG (ref 27–31)
MCV RBC AUTO: 93.5 FL (ref 78–98)
MDIFF COMPLETE?: YES
PLATELET # BLD AUTO: 242 THOU/UL (ref 130–400)
POLYCHROMASIA BLD QL SMEAR: (no result) (100X)
POTASSIUM SERPL-SCNC: 3 MMOL/L (ref 3.5–5.1)
RBC # BLD AUTO: 3.5 MILL/UL (ref 4.2–5.4)
SODIUM SERPL-SCNC: 145 MMOL/L (ref 136–145)
WBC # BLD AUTO: 7.3 THOU/UL (ref 4.8–10.8)

## 2019-04-18 RX ADMIN — CEFTRIAXONE SCH MLS: 2 INJECTION, POWDER, FOR SOLUTION INTRAMUSCULAR; INTRAVENOUS at 06:02

## 2019-04-18 NOTE — PDOC.FM
- Subjective


Subjective: 





Yael Matute seen at bedside this morning.  She is doing well, she has no 

complaints this morning and there were no acute events overnight.  She denies 

any fever, chills, chest pain, dyspnea, n/v, abdominal pain.  She has had 4 

loose BMs in the last 12 hours.  





- Objective


MAR Reviewed: Yes


Vital Signs & Weight: 


 Vital Signs (12 hours)











  Temp Pulse Resp BP Pulse Ox


 


 04/18/19 04:00  98.3 F  91  18  125/76  95


 


 04/18/19 00:39   90  16  


 


 04/18/19 00:00  99.4 F  90  18  123/75  95


 


 04/17/19 20:00  98 F  101 H  18  123/69  95


 


 04/17/19 18:24   99  16   92 L








 Weight











Admit Weight                   91.172 kg


 


Weight                         95.056 kg











 Most Recent Monitor Data











Heart Rate from ECG            107


 


NIBP                           135/87


 


NIBP BP-Mean                   103


 


Respiration from ECG           36


 


SpO2                           96














I&O: 


 











 04/16/19 04/17/19 04/18/19





 06:59 06:59 06:59


 


Intake Total 3631.8 2603 760


 


Output Total 6480 4110 1410


 


Balance -2848.2 -1507 -650











Result Diagrams: 


 04/19/19 05:40





 04/19/19 05:40





Phys Exam





- Physical Examination


Constitutional: NAD


HEENT: moist MMs, sclera anicteric


Neck: supple, full ROM


Respiratory: no wheezing, no rales, no rhonchi, clear to auscultation bilateral


Cardiovascular: RRR, no significant murmur


Gastrointestinal: soft, non-tender, no distention, positive bowel sounds


Musculoskeletal: no edema, pulses present


right sided paralysis from prev TBI


Psychiatric: normal affect, A&O x 3


Skin: no rash, cap refill <2 seconds





Dx/Plan


(1) Sepsis


Code(s): A41.9 - SEPSIS, UNSPECIFIED ORGANISM   Status: Acute   





(2) Metabolic acidosis


Code(s): E87.2 - ACIDOSIS   Status: Acute   





(3) Hypokalemia


Code(s): E87.6 - HYPOKALEMIA   Status: Acute   





(4) Nephrolithiasis


Status: Acute   





(5) Sepsis


Code(s): A41.9 - SEPSIS, UNSPECIFIED ORGANISM   Status: Resolved   





(6) UTI (urinary tract infection)


Status: Acute   


Qualifiers: 


 





(7) Chronic constipation


Code(s): K59.09 - OTHER CONSTIPATION   Status: Chronic   





(8) Dyslipidemia


Code(s): E78.5 - HYPERLIPIDEMIA, UNSPECIFIED   Status: Chronic   





(9) H/O traumatic brain injury


Code(s): Z87.820 - PERSONAL HISTORY OF TRAUMATIC BRAIN INJURY   Status: Chronic

   





(10) HTN (hypertension)


Code(s): I10 - ESSENTIAL (PRIMARY) HYPERTENSION   Status: Chronic   


Qualifiers: 


 





(11) Obesity (BMI 30.0-34.9)


Code(s): E66.9 - OBESITY, UNSPECIFIED   Status: Chronic   





(12) Nausea & vomiting


Code(s): R11.2 - NAUSEA WITH VOMITING, UNSPECIFIED   Status: Acute   


Qualifiers: 


   Vomiting type: unspecified 





(13) Iron deficiency anemia


Code(s): D50.9 - IRON DEFICIENCY ANEMIA, UNSPECIFIED   Status: Chronic   





(14) History of MI (myocardial infarction)


Code(s): I25.2 - OLD MYOCARDIAL INFARCTION   Status: Chronic   





- Plan


Plan: 





#Acute hypoxic respiratory failure


- intubated 4/11


- Extubated 4/16


- Currently doing well from respiratory standpoint on 2L NC





#septic shock 2/2 complicated UTI from obstructing nephrolithiasis


- Septic shock resolved 


- s/p right stent placed by Dr. Vegas on 4/11


- right arterial line and left external jugular central line placed also on 4/11


- patient currently on ceftriaxone, on meropenem from 4/13-4/17


- urine and blood cultures grew Ecoli sensitive to meropenem and Ceftriaxone


- Meropenem discontinued 4/17 and Ceftriaxone initiated 4/17


- Will consider de-escalating therapy in coordination with Dr. Vegas. 


- Urology and Pulmonology recs appreciated


- Check C Diff Assay for recent episodes of loose stools, stopped miralax 


- KUB ordered by Dr. Vegas. 





#Fluid overload


- Likely due to fluid resuscitation from sepsis


- I&Os show net -1447 in last 24 hours 


- IV lasix as needed





#Hypokalemia


- AM repeat pending


- replace and monitor daily





#Hypernatremia


- resolved 





#hyperchloremia


- improved  





#HTN


- Held home BP meds due to above


- Restart when appropriate





#thrombocytopenia


- 168 this AM





#Right sided weakness


- Per daughter present for 5 years. Sensation intact, but motor function not.


- Due to patient condition, daughter is agreeable to short term placement for 

rehab/SNF until her mother's strength improved. Case Management consulted.








Addendum - Attending





- Attending Attestation


Date/Time: 04/18/19 2951





I personally evaluated the patient and discussed the management with Dr. Rojas


I agree with the History, Examination, Assessment and Plan documented above 

with any addition or exceptions noted below.





74 yo female with history of recurrent UTIs admitted for sepsis


HD #7


Continues to do well. Gets confused about some things but overall oriented. 

Denies pain. Reports she slept well. 


VS reviewed. Labs reviewed. Imaging reviewed.


Agree with PE as documented. 





1. Septic shock: Resolved. Cultures reviewed. Antibiotics de-escalated. Repeat 

procal in AM. 


2. Complicated UTI: Obstructing right nephrolithiasis s/p stent. Uro following. 

Cultures reviewed - E. coli and presumptive Enterococcus. Sensitivities 

reviewed. Tellez removed. 


3. Bacteremia: Cultures reviewed. E. coli. Consider step-down oral Bactrim. 

Trend procal in AM. If down trending could consider 10 day treatment course (

currently day 7). 


4. Respiratory Failure: Resolved.  


5. Electrolyte abnormalities: Continue to trend labs. Replace per protocol. 


6. Thrombocytopenia: Likely related to infection and stress. No evidence of 

HIT. Restart Lovenox. 


7. 3rd spacing: Continue diuresis. 


8. Constipation: Resolved. 





Dispo: Continue monitoring on medical floor. Discuss inpatient rehab vs SNIF vs 

family to care for at home with CM and family. 





Paulette 24F pmh sickle cell anemia, CVA 2006, vit D def here for PST for scheduled Left total hip arthroplasty revision 24F pmh sickle cell anemia s/p bone marrow transplant, CVA 2006, vit D def here for PST for scheduled Left total hip arthroplasty revision

## 2019-04-18 NOTE — RAD
SINGLE VIEW ABDOMEN:

 

Date:  04/18/19 

 

COMPARISON:  

CT abdomen/pelvis dated 04/11/19 and KUB dated 01/22/19. 

 

HISTORY:  

Right renal calculi. 

 

FINDINGS:

Single view of the abdomen shows a nonspecific, nonobstructed bowel gas pattern. A right double-J ure
teral stent is seen in good position. There are multiple calcifications projecting over the lower daniel
e of the right kidney. There is a calcification along the distal third of the ureteral stent which ma
y represent a phlebolith or a calcification in the distal right ureter adjacent to the stent. 

 

An inferior vena cava filter is seen. Degenerative changes are seen in the spine. 

 

IMPRESSION: 

1.  Right ureteral stent appears in good position. 

2.  Right renal calcifications. 

3.  Possible distal right ureteral calcification. 

 

 

POS: TPC

## 2019-04-18 NOTE — PRG
DATE OF SERVICE:  04/18/2019



SUBJECTIVE:  Ms. Matute is still encephalopathic.



OBJECTIVE:  VITAL SIGNS:  She is afebrile.  Heart rate is 90, respiratory rate is

20, oximetry is 96% on room air, and blood pressure 155/74. 

LUNGS:  Clear. 

HEART:  Regular rhythm. 

ABDOMEN:  Soft and nontender. 



Dr. Vegas recommended discharge on Bactrim.  She is basically bedridden at home. 



Dr. Vegas also recommend removing her Tellez. 



She appears to be medically stable.  We will sign off.







Job ID:  [id number]

## 2019-04-18 NOTE — PRG
DATE OF SERVICE:  04/18/2019



SUBJECTIVE:  Ms. Matute's vital signs remained stable.  Today, she is afebrile.  Blood

pressure is good.  Room air saturations good.  She is talkative, but was still

somewhat confused, but I think it is probably close to her normal state.  She still

maintains good urine output.  It is clear.  Her hemoglobin is 10.7, which is stable.

 Platelet count is normal and white count is normal.  Creatinine is 0.54, which is

also stable.  She is currently on Rocephin.  She had a KUB x-ray done this morning

because of a history of stones and stent placement.  We can see only one little

calcification along the course of the right ureter.  She had a much larger stone

little more proximal to this.  She has had numerous small stones up in the kidneys

themselves.  The report on the KUB does not show the sizable stone that she had when

the stent was placed.  It is unlikely she has passed this.  Yesterday, I was able to

talk with her daughter and she will need to have these stones treated.  I could do

it as soon as this coming Wednesday, but the family requested a week from this

Wednesday, which I think will be fine.  I think she should go home on oral Bactrim.

At any time, she is felt stable enough to go home will be transferred to rehab, if

that was her skilled nursing, if that was deemed necessary.  She generally is

maintained in a diaper without a Tellez catheter.  She still has a Tellez in.  I think

this could be removed at any time and she could just resume normal voiding for her.

Just keeping on the Bactrim until the surgical procedure, I think it is reasonable.

My office will set this up.  I think Dr. Lopez is on-call this weekend, and

should there be any need for an urologist, please contact my answering service and

get hold of whoever is that is on-call for me.  I will recheck her Monday if she is

still in-house. 







Job ID:  [id number]

## 2019-04-19 ENCOUNTER — HOSPITAL ENCOUNTER (INPATIENT)
Dept: HOSPITAL 18 - NAV ACUTE | Age: 74
LOS: 6 days | Discharge: HOME HEALTH | DRG: 871 | End: 2019-04-25
Attending: INTERNAL MEDICINE | Admitting: INTERNAL MEDICINE
Payer: MEDICARE

## 2019-04-19 VITALS — TEMPERATURE: 98.6 F

## 2019-04-19 VITALS — DIASTOLIC BLOOD PRESSURE: 77 MMHG | SYSTOLIC BLOOD PRESSURE: 138 MMHG

## 2019-04-19 VITALS — BODY MASS INDEX: 33.1 KG/M2

## 2019-04-19 DIAGNOSIS — J44.9: ICD-10-CM

## 2019-04-19 DIAGNOSIS — Z93.0: ICD-10-CM

## 2019-04-19 DIAGNOSIS — E83.42: ICD-10-CM

## 2019-04-19 DIAGNOSIS — Z90.710: ICD-10-CM

## 2019-04-19 DIAGNOSIS — R65.21: ICD-10-CM

## 2019-04-19 DIAGNOSIS — A41.9: Primary | ICD-10-CM

## 2019-04-19 DIAGNOSIS — G81.91: ICD-10-CM

## 2019-04-19 DIAGNOSIS — E87.6: ICD-10-CM

## 2019-04-19 DIAGNOSIS — E78.5: ICD-10-CM

## 2019-04-19 DIAGNOSIS — N39.0: ICD-10-CM

## 2019-04-19 DIAGNOSIS — R53.81: ICD-10-CM

## 2019-04-19 DIAGNOSIS — K21.9: ICD-10-CM

## 2019-04-19 DIAGNOSIS — Z87.820: ICD-10-CM

## 2019-04-19 DIAGNOSIS — I10: ICD-10-CM

## 2019-04-19 DIAGNOSIS — I25.2: ICD-10-CM

## 2019-04-19 LAB
ANION GAP SERPL CALC-SCNC: 10 MMOL/L (ref 10–20)
BUN SERPL-MCNC: 7 MG/DL (ref 9.8–20.1)
CALCIUM SERPL-MCNC: 8.4 MG/DL (ref 7.8–10.44)
CHLORIDE SERPL-SCNC: 109 MMOL/L (ref 98–107)
CO2 SERPL-SCNC: 30 MMOL/L (ref 23–31)
CREAT CL PREDICTED SERPL C-G-VRATE: 149 ML/MIN (ref 70–130)
GLUCOSE SERPL-MCNC: 99 MG/DL (ref 83–110)
HGB BLD-MCNC: 10 G/DL (ref 12–16)
MCH RBC QN AUTO: 31.1 PG (ref 27–31)
MCV RBC AUTO: 94.2 FL (ref 78–98)
MDIFF COMPLETE?: YES
PLATELET # BLD AUTO: 276 THOU/UL (ref 130–400)
POTASSIUM SERPL-SCNC: 3.1 MMOL/L (ref 3.5–5.1)
RBC # BLD AUTO: 3.21 MILL/UL (ref 4.2–5.4)
SODIUM SERPL-SCNC: 146 MMOL/L (ref 136–145)
WBC # BLD AUTO: 7.4 THOU/UL (ref 4.8–10.8)

## 2019-04-19 PROCEDURE — 83735 ASSAY OF MAGNESIUM: CPT

## 2019-04-19 PROCEDURE — 85025 COMPLETE CBC W/AUTO DIFF WBC: CPT

## 2019-04-19 PROCEDURE — 87205 SMEAR GRAM STAIN: CPT

## 2019-04-19 PROCEDURE — 80053 COMPREHEN METABOLIC PANEL: CPT

## 2019-04-19 PROCEDURE — 80048 BASIC METABOLIC PNL TOTAL CA: CPT

## 2019-04-19 PROCEDURE — 81001 URINALYSIS AUTO W/SCOPE: CPT

## 2019-04-19 PROCEDURE — 87070 CULTURE OTHR SPECIMN AEROBIC: CPT

## 2019-04-19 RX ADMIN — CEFTRIAXONE SCH MLS: 2 INJECTION, POWDER, FOR SOLUTION INTRAMUSCULAR; INTRAVENOUS at 06:00

## 2019-04-19 RX ADMIN — SULFAMETHOXAZOLE AND TRIMETHOPRIM SCH TAB: 800; 160 TABLET ORAL at 20:57

## 2019-04-19 NOTE — DIS
DATE OF ADMISSION:  2019



DATE OF DISCHARGE:  2019



RESIDENT:  Kodi Rojas MD



ADMITTING ATTENDING:  Grady Claude Hogue, MD



DISCHARGE ATTENDING:  Vera Navarrete MD



CONSULTS:  

1. Dileep Vegas MD, Urology on 2019.

2. Leandro Mehta MD, Pulmonology on 2019.

3. Case Management on 2019.

4. Palliative Care on 2019.

5. PT on 2019.

6. Rehab screen on 2019.

7. Case Management on 2019.



PROCEDURES:  

1. Retrograde pyelogram on 2019, impression, fluoroscopy for surgical use,

double-J ureteral stent in right ureter placed. 

2. Chest x-ray on 2019, impression, right perihilar airspace opacity

concerning for infection. 

3. CT abdomen and pelvis on 2019, impression, right ureteral calcification

seen with moderate right hydronephrosis.  Other additional right renal

calcifications noted.  Hepatic cyst.  cholelithiasis.  Diverticulosis. 

4. Chest x-ray on 2019, impression, appropriate position of ET tube and

subclavian central venous catheter seen. 

5. Operative procedure by Dr. Vegas on 2019, cystoscopy with right retrograde

and right ureteral stent placement. 

6. Chest x-ray from 2019, impression, stable bilateral minimal pleural and

parenchymal opacity changes.  No significant new process. 

7. Chest x-ray on 2019, impression, stable exam.

8. Chest x-ray on 2019, impression, stable exam.

9. Chest x-ray on 04/15/2019, impression, bibasilar pleural-parenchymal lung

changes, slightly worsened as compared to prior study. 

10. Chest x-ray on 2019, impression, stable exam.

11. Chest x-ray on 2019, impression, left subclavian catheter with tip at the

level of the superior vena cava and left innominate vein.  Bilateral vascular

congestion and probable small pleural effusions.  Consider short-term followup. 

12. Chest x-ray on 2019, impression, small bilateral pleural effusions.

Increased interstitial and patchy density in the left lung base, which may be

related to atelectasis or developing pneumonia. 

13. X-ray abdomen on 2019, impression, right ureteral stent appears in good

position.  Right renal calcifications.  Possible distal right ureteral

calcification. 

14. Blood culture x2 from 2019 grew E coli sensitive to meropenem,

ceftriaxone, resistant to Cipro and levofloxacin. 

15. Urine culture from 2019 grew E coli with similar sensitivities as above.

16. Influenza swab from 2019, negative for influenza type A and B.

17. Blood culture x2 from 2019, no growth to date.

18. Clostridium difficile antigen toxin from 2019, Clostridium difficile

antigen positive, toxin negative. 



PRIMARY DIAGNOSES:  

1. Sepsis secondary to infected ureterolithiasis.

2. Nephrolithiasis.

3. Metabolic acidosis.

4. Acute respiratory failure with hypoxia.



SECONDARY DIAGNOSES:  

1. Hypertension.

2. Obesity.

3. Dyslipidemia.

4. History of traumatic brain injury with residual right-sided deficits.

5. Thrombocytopenia.

6. Hypernatremia.

7. Chronic constipation.

8. Hypokalemia.



DISCHARGE MEDICATIONS:  Resume home medications includin. Pravastatin 20 mg p.o. at bedtime.

2. Sertraline 50 mg p.o. at bedtime.

3. Vitamin D3.

4. Ferrous sulfate 325 mg p.o. at bedtime.

5. Pantoprazole 40 mg p.o. at bedtime.

6. MiraLAX 17 g p.o. daily.

7. Diflucan 150 mg p.o. daily.

8. New home medications:  Bactrim Double Strength tablet, one tab p.o. b.i.d.



HISTORY OF PRESENT ILLNESS/HOSPITAL COURSE:  Yael Matute is a 73-year-old female with

past medical history of nephrolithiasis, recurrent UTIs, history of TBI with

residual right-sided paralysis, CAD status post MI, hypertension, hyperlipidemia,

GERD, history of recurrent kidney stones, multiple UTIs, who was brought into the ED

by EMS due to fever and abdominal pain.  Abdominal pain primarily located in the

right upper quadrant and was associated with nausea and vomiting.  She developed

fever the morning of admission and called EMS.  In January, she had multiple stones

removed by Dr. Cheney, and states that her baseline mental status and is A and O x2.

 She lives at home, is bedridden.  She had a CT scan that was read as above.

Initial white blood cell count was 16.4 with bandemia.  The patient received

vancomycin, Zosyn, 1.5 L NS, 1 g Tylenol, and 40 mEq of IV KCl in the ED.  She was

initially hemodynamically stable, but her blood pressure dropped to the 60s over

50s.  The patient became altered and central line was placed.  Dr. Vegas was

consulted in the ER and decision made to take her to surgery from where right

ureteral stent was placed.  She went to the CCU after her admission and was

intubated on Levophed drip.  Meropenem was continued for the next several days of

her admission.  The patient was able to be weaned off the Levophed drip relatively

quickly and she was extubated on 2018.  After extubation, the patient

continued to do well.  She was transferred to the floor and she was continued on IV

Rocephin.  Dr. Vegas left a note stating that the patient was clear for discharge

and he would like her to continue taking Bactrim until he can take her back for

stent removal and lithotripsy.  The patient was cleared for discharge from a

pulmonary standpoint on , as well as Dr. Stewart's standpoint.  Also notable,

the patient had potassium levels that were ranging from 3.0 to 3.5 during her

hospital stay and magnesium levels are ranging from 0.91 to 2.4 during her

admission.  Both magnesium and potassium were supplemented.  The patient was cleared

for discharge on 2019 with instructions.  She was accepted to [facility].  It was noted that Dr. Vegas's office would set up procedure for

stent removal and lithotripsy.  Otherwise, the patient's hospital stay was

uneventful after being moved to the floor. 



DISPOSITION:  Guarded.  The patient does have a long history of nephrolithiasis and

is at risk for recurrent infected stones.  However, she should do well if she

continues antibiotic.  Continues to get PT and OT, and electrolyte management at the

[facility].  Follows up with Dr. Vegas for lithotripsy and stent removal. 



DISCHARGE INSTRUCTIONS:  

1. Location:  [facility].

2. Diet:  Heart healthy.

3. Activity:  As tolerated.

4. Followup:  Follow up with Dr. Vegas.







Job ID:  [id number]

## 2019-04-19 NOTE — PDOC.FM
- Subjective


Subjective: 





Yael Matute seen at bedside this morning.  States that she is doing well, no 

complaints.  There were no acute events overnight.  She denies fever, chills, 

chest pain, dyspnea, n/v, abdominal pain. 





- Objective


MAR Reviewed: Yes


Vital Signs & Weight: 


 Vital Signs (12 hours)











  Temp Pulse Resp BP Pulse Ox


 


 04/19/19 06:28   86  16   96


 


 04/18/19 23:14      95


 


 04/18/19 19:49  98.3 F  101 H  16  149/89 H  97


 


 04/18/19 19:11      96


 


 04/18/19 19:09      96








 Weight











Admit Weight                   91.172 kg


 


Weight                         95.073 kg











 Most Recent Monitor Data











Heart Rate from ECG            107


 


NIBP                           135/87


 


NIBP BP-Mean                   103


 


Respiration from ECG           36


 


SpO2                           96














I&O: 


 











 04/17/19 04/18/19 04/19/19





 06:59 06:59 06:59


 


Intake Total 2603 760 


 


Output Total 4110 1410 400


 


Balance -1507 -650 -400











Result Diagrams: 


 04/19/19 05:40





 04/19/19 05:40





Phys Exam





- Physical Examination


Constitutional: NAD


HEENT: moist MMs, sclera anicteric


Neck: supple, full ROM


Respiratory: no wheezing, no rales, no rhonchi, clear to auscultation bilateral


Cardiovascular: RRR, no significant murmur


Gastrointestinal: soft, non-tender, no distention


Musculoskeletal: no edema, pulses present


right sided focal deficits from TBI 


Psychiatric: normal affect


Deviation from normal: A&O X2


Skin: cap refill <2 seconds





Dx/Plan


(1) Sepsis


Code(s): A41.9 - SEPSIS, UNSPECIFIED ORGANISM   Status: Acute   





(2) Metabolic acidosis


Code(s): E87.2 - ACIDOSIS   Status: Acute   





(3) Hypokalemia


Code(s): E87.6 - HYPOKALEMIA   Status: Acute   





(4) Nephrolithiasis


Status: Acute   





(5) Sepsis


Code(s): A41.9 - SEPSIS, UNSPECIFIED ORGANISM   Status: Resolved   





(6) UTI (urinary tract infection)


Status: Acute   


Qualifiers: 


 





(7) Chronic constipation


Code(s): K59.09 - OTHER CONSTIPATION   Status: Chronic   





(8) Dyslipidemia


Code(s): E78.5 - HYPERLIPIDEMIA, UNSPECIFIED   Status: Chronic   





(9) H/O traumatic brain injury


Code(s): Z87.820 - PERSONAL HISTORY OF TRAUMATIC BRAIN INJURY   Status: Chronic

   





(10) HTN (hypertension)


Code(s): I10 - ESSENTIAL (PRIMARY) HYPERTENSION   Status: Chronic   


Qualifiers: 


 





(11) Obesity (BMI 30.0-34.9)


Code(s): E66.9 - OBESITY, UNSPECIFIED   Status: Chronic   





(12) Nausea & vomiting


Code(s): R11.2 - NAUSEA WITH VOMITING, UNSPECIFIED   Status: Acute   


Qualifiers: 


   Vomiting type: unspecified 





(13) Iron deficiency anemia


Code(s): D50.9 - IRON DEFICIENCY ANEMIA, UNSPECIFIED   Status: Chronic   





(14) History of MI (myocardial infarction)


Code(s): I25.2 - OLD MYOCARDIAL INFARCTION   Status: Chronic   





- Plan


Plan: 





Acute hypoxic respiratory failure


- intubated 4/11


- Extubated 4/16


- Currently doing well from respiratory standpoint on RA





Septic shock 2/2 complicated UTI from obstructing nephrolithiasis


- Septic shock resolved 


- s/p right stent placed by Dr. Vegas on 4/11


- right arterial line and left external jugular central line placed also on 4/11


- patient currently on ceftriaxone, on meropenem from 4/13-4/17


- urine and blood cultures grew Ecoli sensitive to meropenem and Ceftriaxone


- Meropenem discontinued 4/17 and Ceftriaxone initiated 4/17


- Urology and Pulmonology recs appreciated


- Check C Diff Assay for recent episodes of loose stools, stopped miralax 


- Dr. Vegas recommended pt be discharged on bactrim, will transition to po 

bactrim today


- Accepted to [facility]





#Fluid overload


- Likely due to fluid resuscitation from sepsis


- I&Os show net -5 L in last 3 days


- IV lasix as needed





#Hypokalemia


- AM repeat pending


- replace and monitor daily


- trending mag and K and supplementing 





#Hypernatremia


- resolved 





#hyperchloremia


- improved  





#HTN


- Held home BP meds due to above


- Restart when appropriate





#thrombocytopenia


- 168 this AM





#Right sided weakness


- Per daughter present for 5 years. Sensation intact, but motor function not.


- Due to patient condition, daughter is agreeable to short term placement for 

rehab/SNF until her mother's strength improved. Case Management consulted.





Addendum - Attending





- Attending Attestation


Date/Time: 04/19/19 0036





I personally evaluated the patient and discussed the management with Dr. Rojas


I agree with the History, Examination, Assessment and Plan documented above 

with any addition or exceptions noted below.





74 yo female with history of recurrent UTIs admitted for sepsis


HD #8


Did well overnight. Oriented but confused about some details of things in her 

life. Denies pain. No fevers. 


VS reviewed. Labs reviewed. Imaging reviewed.


Agree with PE as documented. 





1. Septic shock: Resolved. Procal 0.29.


2. Complicated UTI: Obstructing right nephrolithiasis s/p stent. Uro following. 

Cultures reviewed - E. coli and presumptive Enterococcus. Sensitivities 

reviewed. Tellez removed. Follow up with urology out patient. 


3. Bacteremia: Cultures reviewed. E. coli. Continue bactrim for 10 days total. 


4. Respiratory Failure: Resolved.  


5. Electrolyte abnormalities: Continue to trend labs. Replace per protocol. 


6. Thrombocytopenia: Likely related to infection and stress. No evidence of 

HIT. Now back on ppx Lovenox. Tolerating well. 


7. 3rd spacing: Improved with diuresis. 


8. Constipation: Resolved. 


9. Deconditioning: Patient not able to really perform ADLS at baseline but did 

have family assistance at home. Now with increased weakness due to recent 

severe infection. Will send to inpatient rehab for short time to see if patient 

able to go back home. Otherwise would consider SNF. 





Dispo: Ok to d/c to home. Awaiting placement approval. Would trend procal and 

electrolytes. 





Paulette

## 2019-04-20 LAB
ALBUMIN SERPL BCG-MCNC: 3 G/DL (ref 3.4–4.8)
ALP SERPL-CCNC: 76 U/L (ref 40–150)
ALT SERPL W P-5'-P-CCNC: 12 U/L (ref 8–55)
ANION GAP SERPL CALC-SCNC: 12 MMOL/L (ref 10–20)
AST SERPL-CCNC: 13 U/L (ref 5–34)
BASOPHILS # BLD AUTO: 0.1 THOU/UL (ref 0–0.2)
BASOPHILS NFR BLD AUTO: 0.7 % (ref 0–1)
BILIRUB SERPL-MCNC: 0.4 MG/DL (ref 0.2–1.2)
BUN SERPL-MCNC: 6 MG/DL (ref 9.8–20.1)
CALCIUM SERPL-MCNC: 8.4 MG/DL (ref 7.8–10.44)
CHLORIDE SERPL-SCNC: 109 MMOL/L (ref 98–107)
CO2 SERPL-SCNC: 26 MMOL/L (ref 23–31)
CREAT CL PREDICTED SERPL C-G-VRATE: 135 ML/MIN (ref 70–130)
EOSINOPHIL # BLD AUTO: 0.1 THOU/UL (ref 0–0.7)
EOSINOPHIL NFR BLD AUTO: 1.2 % (ref 0–10)
GLOBULIN SER CALC-MCNC: 3 G/DL (ref 2.4–3.5)
GLUCOSE SERPL-MCNC: 97 MG/DL (ref 83–110)
HGB BLD-MCNC: 9.6 G/DL (ref 12–16)
LYMPHOCYTES # BLD AUTO: 1.5 THOU/UL (ref 1.2–3.4)
LYMPHOCYTES NFR BLD AUTO: 22.2 % (ref 21–51)
MCH RBC QN AUTO: 29 PG (ref 27–31)
MCV RBC AUTO: 92.7 FL (ref 78–98)
MONOCYTES # BLD AUTO: 0.6 THOU/UL (ref 0.11–0.59)
MONOCYTES NFR BLD AUTO: 8 % (ref 0–10)
NEUTROPHILS # BLD AUTO: 4.7 THOU/UL (ref 1.4–6.5)
NEUTROPHILS NFR BLD AUTO: 67.9 % (ref 42–75)
PLATELET # BLD AUTO: 276 THOU/UL (ref 130–400)
POTASSIUM SERPL-SCNC: 3.8 MMOL/L (ref 3.5–5.1)
RBC # BLD AUTO: 3.31 MILL/UL (ref 4.2–5.4)
RBC UR QL AUTO: (no result) HPF (ref 0–3)
SODIUM SERPL-SCNC: 143 MMOL/L (ref 136–145)
SP GR UR STRIP: 1.01 (ref 1–1.03)
WBC # BLD AUTO: 6.9 THOU/UL (ref 4.8–10.8)
WBC UR QL AUTO: (no result) HPF (ref 0–3)

## 2019-04-20 RX ADMIN — SULFAMETHOXAZOLE AND TRIMETHOPRIM SCH TAB: 800; 160 TABLET ORAL at 20:19

## 2019-04-20 RX ADMIN — SULFAMETHOXAZOLE AND TRIMETHOPRIM SCH TAB: 800; 160 TABLET ORAL at 08:24

## 2019-04-20 NOTE — PRG
DATE OF SERVICE:  04/20/2019



SUBJECTIVE:  The patient feels well, sleeping, but awakens easily, answers questions

in monosyllables, but appropriately, and denies shortness of breath, chest pain.

She states she is hungry and has no chills or sweats. 



OBJECTIVE:  VITAL SIGNS:  Blood pressure is not available yet this morning. 

LUNGS:  Clear. 

CARDIAC EXAMINATION:  Regular rhythm. 

ABDOMEN:  Obese and nontender. 

SKIN/EXTREMITIES:  No edema. 

NEUROLOGICAL:  Right hemiplegia.



ASSESSMENT:  

1. Resolving sepsis secondary to infected right ureterolithiasis with stent in

place, on oral Bactrim until lithotripsy can be done by Dr. Vegas. 

2. 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

3. Severe deconditioning.

4. Hypomagnesia and hypokalemia, persistent.



PLAN:  

1. Magnesium oxide 400 twice daily.

2. K-Dur 20 mEq daily.

3. Basic metabolic profile and magnesium level in 2 days.

4. Continue oral Bactrim DS twice daily.

5. Consult PT/OT when available.

6. Discuss lithotripsy with Dr. Vegas, when the patient is ready for discharge.







Job ID:  [id number]
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
## 2019-04-21 RX ADMIN — SULFAMETHOXAZOLE AND TRIMETHOPRIM SCH TAB: 800; 160 TABLET ORAL at 21:05

## 2019-04-21 RX ADMIN — SULFAMETHOXAZOLE AND TRIMETHOPRIM SCH TAB: 800; 160 TABLET ORAL at 08:20

## 2019-04-21 NOTE — PRG
DATE OF SERVICE:  04/21/2019



SUBJECTIVE:  The patient feels well, lying in the bed, awake and alert, in no

distress.  Does agree to working with therapy tomorrow, but has not been out of the

bed. 



OBJECTIVE:  VITAL SIGNS:  Temperature is 97.9, pulse 81, respirations 20, O2 sats

92% on room air, and blood pressure is 122/58. 

LUNGS:  Clear. 

CARDIAC:  Showed regular rhythm. 

ABDOMEN:  Soft and nontender. 

SKIN/EXTREMITIES:  Display no edema, clubbing, or cyanosis. 

NEUROLOGIC:  Dense right hemiplegia.



ASSESSMENT:  

1. Traumatic brain injury with right hemiplegia.

2. Recurrent urinary tract infections, status post stent and antibiotics for

infected right ureterolithiasis. 

3. Severe deconditioning.

4. Hypokalemia, hypomagnesemia, on supplementation with labs to be done tomorrow.



PLAN:  

1. Discussed with PT and family about therapy possibility.

2. Check magnesium, basic metabolic profile in the a.m.

3. Continue Bactrim DS twice daily.

4. Discussed with Dr. Vegas lithotripsy when discharge is planned.







Job ID:  [id number]

## 2019-04-22 LAB
ANION GAP SERPL CALC-SCNC: 14 MMOL/L (ref 10–20)
BUN SERPL-MCNC: 8 MG/DL (ref 9.8–20.1)
CALCIUM SERPL-MCNC: 8.8 MG/DL (ref 7.8–10.44)
CHLORIDE SERPL-SCNC: 106 MMOL/L (ref 98–107)
CO2 SERPL-SCNC: 21 MMOL/L (ref 23–31)
CREAT CL PREDICTED SERPL C-G-VRATE: 107 ML/MIN (ref 70–130)
GLUCOSE SERPL-MCNC: 99 MG/DL (ref 83–110)
MAGNESIUM SERPL-MCNC: 2.1 MG/DL (ref 1.6–2.6)
POTASSIUM SERPL-SCNC: 4.2 MMOL/L (ref 3.5–5.1)
SODIUM SERPL-SCNC: 137 MMOL/L (ref 136–145)

## 2019-04-22 RX ADMIN — SULFAMETHOXAZOLE AND TRIMETHOPRIM SCH TAB: 800; 160 TABLET ORAL at 20:57

## 2019-04-22 RX ADMIN — SULFAMETHOXAZOLE AND TRIMETHOPRIM SCH TAB: 800; 160 TABLET ORAL at 08:54

## 2019-04-23 RX ADMIN — SULFAMETHOXAZOLE AND TRIMETHOPRIM SCH TAB: 800; 160 TABLET ORAL at 08:32

## 2019-04-23 RX ADMIN — SULFAMETHOXAZOLE AND TRIMETHOPRIM SCH TAB: 800; 160 TABLET ORAL at 19:53

## 2019-04-24 RX ADMIN — SULFAMETHOXAZOLE AND TRIMETHOPRIM SCH TAB: 800; 160 TABLET ORAL at 08:26

## 2019-04-24 RX ADMIN — SULFAMETHOXAZOLE AND TRIMETHOPRIM SCH TAB: 800; 160 TABLET ORAL at 19:40

## 2019-04-25 VITALS — TEMPERATURE: 98.1 F | SYSTOLIC BLOOD PRESSURE: 128 MMHG | DIASTOLIC BLOOD PRESSURE: 62 MMHG

## 2019-04-25 RX ADMIN — SULFAMETHOXAZOLE AND TRIMETHOPRIM SCH TAB: 800; 160 TABLET ORAL at 08:12

## 2019-05-01 ENCOUNTER — HOSPITAL ENCOUNTER (OUTPATIENT)
Dept: HOSPITAL 92 - SDC | Age: 74
Discharge: HOME | End: 2019-05-01
Attending: UROLOGY
Payer: MEDICARE

## 2019-05-01 VITALS — BODY MASS INDEX: 32.3 KG/M2

## 2019-05-01 DIAGNOSIS — G82.20: ICD-10-CM

## 2019-05-01 DIAGNOSIS — N20.1: Primary | ICD-10-CM

## 2019-05-01 DIAGNOSIS — I25.10: ICD-10-CM

## 2019-05-01 DIAGNOSIS — E66.9: ICD-10-CM

## 2019-05-01 DIAGNOSIS — K21.9: ICD-10-CM

## 2019-05-01 DIAGNOSIS — E78.00: ICD-10-CM

## 2019-05-01 LAB
CLOSURE TME COLL+EPINEP BLD: 145 SEC (ref 67–199)
PLATELET # BLD: 525 THOU/UL (ref 130–400)

## 2019-05-01 PROCEDURE — 74018 RADEX ABDOMEN 1 VIEW: CPT

## 2019-05-01 PROCEDURE — 0TF6XZZ FRAGMENTATION IN RIGHT URETER, EXTERNAL APPROACH: ICD-10-PCS | Performed by: UROLOGY

## 2019-05-01 PROCEDURE — 52332 CYSTOSCOPY AND TREATMENT: CPT

## 2019-05-01 PROCEDURE — C1758 CATHETER, URETERAL: HCPCS

## 2019-05-01 PROCEDURE — 85576 BLOOD PLATELET AGGREGATION: CPT

## 2019-05-01 PROCEDURE — 97139 UNLISTED THERAPEUTIC PX: CPT

## 2019-05-01 PROCEDURE — 50590 FRAGMENTING OF KIDNEY STONE: CPT

## 2019-05-01 PROCEDURE — 0T768DZ DILATION OF RIGHT URETER WITH INTRALUMINAL DEVICE, VIA NATURAL OR ARTIFICIAL OPENING ENDOSCOPIC: ICD-10-PCS | Performed by: UROLOGY

## 2019-05-01 NOTE — RAD
AP VIEW ABDOMEN:



HISTORY:  Preoperative radiograph abdomen.



Comparison made to previous exam from 4/18/2019.



FINDINGS:

AP view abdomen demonstrates inferior vena caval filter in place.



Again, right ureteral stent is in place.



Radiopaque calculus noted on previous radiograph in the distal right ureter appears to have passed an
d is no longer visible.



A large amount of stool is seen now in the colon. The patient appears to be developing severe changes
 of constipation.



No dilated loops of bowel seen.



IMPRESSION: 

Likely passage of distal right ureteral calculus.



Transcribed Date/Time: 5/1/2019 8:37 AM



Reported By: Gilles Orellana 

Electronically Signed:  5/1/2019 10:12 AM

## 2019-05-01 NOTE — OP
DATE OF PROCEDURE:  05/01/2019



PREOPERATIVE DIAGNOSIS:  Right ureteral stone.



POSTOPERATIVE DIAGNOSIS:  Right ureteral stone.



PROCEDURE PERFORMED:  Right extracorporeal shock wave lithotripsy, cystoscopy,

removal and replacement of right stent. 



ANESTHESIA:  General.



ESTIMATED BLOOD LOSS:  Minimal.



FINDINGS:  She had a 1 cm stone in the proximal ureter, treated with 3000 shocks at

level 5 and 6, and they appeared to fragment well.  The stent was replaced, 6-Somali

by 22.  She has had this stent in for about 3 weeks.  I do not see a distal ureteral

stone, assuming that is probably passed. 



DESCRIPTION OF PROCEDURE:  Obtained written and verbal consent from the patient and

her family.  She was taken to the operating suite.  She was placed in a supine

position on treatment table.  She was given a general anesthetic and oral obturator

intubation.  The stone was placed in treatment focal point, and she was coupled with

the unit.  Shockwave therapy was commenced.  We slowly brought her up to level 5 and

then the last half level 6.  Fluoroscopy was used intermittently to document stone

fragmentation and reposition as necessary.  The stone did appear to fragment pretty

well, could not see a stone in the distal ureter.  She had a small one when her

stent was placed, I am assuring that had probably passed.  She was then moved to the

dorsal lithotomy position.  She was sterilely prepped and draped.  Cystoscopy was

performed with a 22-Somali sheath.  This was well lubricated, passed under direct

vision through the female urethra into the urinary bladder with the aid of a

30-degree lens and a video camera and monitor.  The distal end of double-J stent was

grasped and brought out through the urethral meatus.  A guidewire was fed through

this and up into area of the renal pelvis.  The stent was removed over the

guidewire.  A 5-Somali Pollack catheter was placed over the guidewire up into the

area of the renal pelvis.  The guidewire was removed and contrast was injected

showing still mildly dilated right upper collecting system.  No extravasation.  The

guidewires were placed.  The open-ended catheter was removed.  A stent was placed

over the guidewire, pushed up into place with aid of a pusher, so its proximal end

coiled in the renal pelvis and its distal end coiled in the bladder when the wire

was removed.  The patient was then taken out of the dorsal lithotomy position,

awakened, extubated, and taken by stretcher to recovery room. 







Job ID:  [id number]

## 2019-06-25 ENCOUNTER — HOSPITAL ENCOUNTER (INPATIENT)
Dept: HOSPITAL 92 - ERS | Age: 74
LOS: 3 days | Discharge: HOME | DRG: 872 | End: 2019-06-28
Attending: FAMILY MEDICINE | Admitting: FAMILY MEDICINE
Payer: MEDICARE

## 2019-06-25 VITALS — BODY MASS INDEX: 32.3 KG/M2

## 2019-06-25 DIAGNOSIS — Z74.01: ICD-10-CM

## 2019-06-25 DIAGNOSIS — I25.10: ICD-10-CM

## 2019-06-25 DIAGNOSIS — Z90.710: ICD-10-CM

## 2019-06-25 DIAGNOSIS — E86.0: ICD-10-CM

## 2019-06-25 DIAGNOSIS — A41.9: Primary | ICD-10-CM

## 2019-06-25 DIAGNOSIS — N10: ICD-10-CM

## 2019-06-25 DIAGNOSIS — F03.90: ICD-10-CM

## 2019-06-25 DIAGNOSIS — G81.91: ICD-10-CM

## 2019-06-25 DIAGNOSIS — K59.00: ICD-10-CM

## 2019-06-25 DIAGNOSIS — B95.2: ICD-10-CM

## 2019-06-25 DIAGNOSIS — E78.5: ICD-10-CM

## 2019-06-25 DIAGNOSIS — K21.9: ICD-10-CM

## 2019-06-25 DIAGNOSIS — I10: ICD-10-CM

## 2019-06-25 DIAGNOSIS — E66.9: ICD-10-CM

## 2019-06-25 DIAGNOSIS — E87.6: ICD-10-CM

## 2019-06-25 DIAGNOSIS — I25.2: ICD-10-CM

## 2019-06-25 DIAGNOSIS — Z79.899: ICD-10-CM

## 2019-06-25 DIAGNOSIS — E87.2: ICD-10-CM

## 2019-06-25 DIAGNOSIS — Z87.820: ICD-10-CM

## 2019-06-25 LAB
ALBUMIN SERPL BCG-MCNC: 3.6 G/DL (ref 3.4–4.8)
ALP SERPL-CCNC: 84 U/L (ref 40–150)
ALT SERPL W P-5'-P-CCNC: 12 U/L (ref 8–55)
ANION GAP SERPL CALC-SCNC: 15 MMOL/L (ref 10–20)
AST SERPL-CCNC: 17 U/L (ref 5–34)
BILIRUB SERPL-MCNC: 0.7 MG/DL (ref 0.2–1.2)
BUN SERPL-MCNC: 21 MG/DL (ref 9.8–20.1)
CALCIUM SERPL-MCNC: 9.1 MG/DL (ref 7.8–10.44)
CHLORIDE SERPL-SCNC: 105 MMOL/L (ref 98–107)
CO2 SERPL-SCNC: 23 MMOL/L (ref 23–31)
CREAT CL PREDICTED SERPL C-G-VRATE: 0 ML/MIN (ref 70–130)
GLOBULIN SER CALC-MCNC: 3.7 G/DL (ref 2.4–3.5)
GLUCOSE SERPL-MCNC: 167 MG/DL (ref 83–110)
HGB BLD-MCNC: 14.2 G/DL (ref 12–16)
MCH RBC QN AUTO: 31.3 PG (ref 27–31)
MCV RBC AUTO: 94.9 FL (ref 78–98)
MDIFF COMPLETE?: YES
PLATELET # BLD AUTO: 269 THOU/UL (ref 130–400)
POTASSIUM SERPL-SCNC: 3.4 MMOL/L (ref 3.5–5.1)
PROT UR STRIP.AUTO-MCNC: 100 MG/DL
RBC # BLD AUTO: 4.53 MILL/UL (ref 4.2–5.4)
SODIUM SERPL-SCNC: 140 MMOL/L (ref 136–145)
SP GR UR STRIP: 1.01 (ref 1–1.03)
WBC # BLD AUTO: 26.7 THOU/UL (ref 4.8–10.8)

## 2019-06-25 PROCEDURE — 87077 CULTURE AEROBIC IDENTIFY: CPT

## 2019-06-25 PROCEDURE — 94760 N-INVAS EAR/PLS OXIMETRY 1: CPT

## 2019-06-25 PROCEDURE — 93005 ELECTROCARDIOGRAM TRACING: CPT

## 2019-06-25 PROCEDURE — 85025 COMPLETE CBC W/AUTO DIFF WBC: CPT

## 2019-06-25 PROCEDURE — 81003 URINALYSIS AUTO W/O SCOPE: CPT

## 2019-06-25 PROCEDURE — 80053 COMPREHEN METABOLIC PANEL: CPT

## 2019-06-25 PROCEDURE — 36415 COLL VENOUS BLD VENIPUNCTURE: CPT

## 2019-06-25 PROCEDURE — 74176 CT ABD & PELVIS W/O CONTRAST: CPT

## 2019-06-25 PROCEDURE — 96365 THER/PROPH/DIAG IV INF INIT: CPT

## 2019-06-25 PROCEDURE — 84100 ASSAY OF PHOSPHORUS: CPT

## 2019-06-25 PROCEDURE — 84145 PROCALCITONIN (PCT): CPT

## 2019-06-25 PROCEDURE — 96367 TX/PROPH/DG ADDL SEQ IV INF: CPT

## 2019-06-25 PROCEDURE — C9113 INJ PANTOPRAZOLE SODIUM, VIA: HCPCS

## 2019-06-25 PROCEDURE — 80048 BASIC METABOLIC PNL TOTAL CA: CPT

## 2019-06-25 PROCEDURE — 87086 URINE CULTURE/COLONY COUNT: CPT

## 2019-06-25 PROCEDURE — 51701 INSERT BLADDER CATHETER: CPT

## 2019-06-25 PROCEDURE — 81015 MICROSCOPIC EXAM OF URINE: CPT

## 2019-06-25 PROCEDURE — 87186 SC STD MICRODIL/AGAR DIL: CPT

## 2019-06-25 PROCEDURE — 71045 X-RAY EXAM CHEST 1 VIEW: CPT

## 2019-06-25 PROCEDURE — 87040 BLOOD CULTURE FOR BACTERIA: CPT

## 2019-06-25 PROCEDURE — 83605 ASSAY OF LACTIC ACID: CPT

## 2019-06-25 PROCEDURE — 83735 ASSAY OF MAGNESIUM: CPT

## 2019-06-25 PROCEDURE — A4353 INTERMITTENT URINARY CATH: HCPCS

## 2019-06-25 NOTE — RAD
Chest one view



HISTORY: Urinary tract infection. Fever.



COMPARISON: 4/17/2019.



FINDINGS: Cardiac silhouette is magnified by projection. Pulmonary vasculature are accentuated by sha
llow inspiration and is upper limits of normal. Mediastinum is midline. No lobar consolidation or

evidence of pneumothorax.







IMPRESSION: Chronic-type findings are stable. No active cardiopulmonary abnormalities are demonstrate
d.



Reported By: MARCUS Koehler 

Electronically Signed:  6/25/2019 9:14 PM

## 2019-06-25 NOTE — CT
CT abdomen and pelvis noncontrast



HISTORY: Right flank pain.



FINDINGS: The right renal collecting system and ureter are moderately distended to the level of the u
reterovesicular junction. There is a tiny calculus at the expected location of the right UVJ. There

is also a 0.7 cm calculus within the lumen of the urinary bladder. Subtle stranding around the right 
kidney and proximal right ureter.



Within calyces of the right kidney, multiple additional calcifications are present, measuring up to 2
.1 cm. Left renal collecting system and ureter are decompressed without stone apparent. Hyperdense

stones are demonstrated within the gallbladder lumen. Atelectasis at the lung bases.



Lack of contrast limits evaluation for other abnormalities. IVC filter in place. Calcification throug
hout the arterial structures. Prominent degenerative changes of the lumbar spine. Diverticula arise

from the colon. Rectum is distended with fecal material up to 9.7 cm with relative thickening of the 
wall allowing for the degree of distention.







IMPRESSION: Given that no stones are reliably demonstrated within the right ureter, the distention ma
y be related to a recently passed 7 mm stone now within the urinary bladder. There are multiple

additional large right renal calculi.



Cholelithiasis.



Atherosclerosis.



Fecal distention of the rectum with relative wall thickening. Clinical correlation regarding other si
gns and symptoms of stercoral proctitis is required.



Diverticulosis.



Reported By: MARCUS Koehler 

Electronically Signed:  6/25/2019 10:40 PM

## 2019-06-26 LAB
ANION GAP SERPL CALC-SCNC: 12 MMOL/L (ref 10–20)
BACTERIA UR QL AUTO: (no result) HPF
BUN SERPL-MCNC: 19 MG/DL (ref 9.8–20.1)
CALCIUM SERPL-MCNC: 8.6 MG/DL (ref 7.8–10.44)
CHLORIDE SERPL-SCNC: 109 MMOL/L (ref 98–107)
CO2 SERPL-SCNC: 25 MMOL/L (ref 23–31)
CREAT CL PREDICTED SERPL C-G-VRATE: 0 ML/MIN (ref 70–130)
GLUCOSE SERPL-MCNC: 115 MG/DL (ref 83–110)
HGB BLD-MCNC: 12.7 G/DL (ref 12–16)
HYALINE CASTS #/AREA URNS LPF: (no result) LPF
MCH RBC QN AUTO: 31.1 PG (ref 27–31)
MCV RBC AUTO: 96.1 FL (ref 78–98)
MDIFF COMPLETE?: YES
PLATELET # BLD AUTO: 245 THOU/UL (ref 130–400)
POTASSIUM SERPL-SCNC: 3.5 MMOL/L (ref 3.5–5.1)
RBC # BLD AUTO: 4.08 MILL/UL (ref 4.2–5.4)
RBC UR QL AUTO: (no result) HPF (ref 0–3)
SODIUM SERPL-SCNC: 142 MMOL/L (ref 136–145)
WBC # BLD AUTO: 19.2 THOU/UL (ref 4.8–10.8)

## 2019-06-26 RX ADMIN — CEFTRIAXONE SCH MLS: 1 INJECTION, POWDER, FOR SOLUTION INTRAMUSCULAR; INTRAVENOUS at 22:20
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
## 2019-06-27 LAB
ANION GAP SERPL CALC-SCNC: 10 MMOL/L (ref 10–20)
BASOPHILS # BLD AUTO: 0 THOU/UL (ref 0–0.2)
BASOPHILS NFR BLD AUTO: 0.1 % (ref 0–1)
BUN SERPL-MCNC: 9 MG/DL (ref 9.8–20.1)
CALCIUM SERPL-MCNC: 8.4 MG/DL (ref 7.8–10.44)
CHLORIDE SERPL-SCNC: 109 MMOL/L (ref 98–107)
CO2 SERPL-SCNC: 25 MMOL/L (ref 23–31)
CREAT CL PREDICTED SERPL C-G-VRATE: 118 ML/MIN (ref 70–130)
EOSINOPHIL # BLD AUTO: 0.1 THOU/UL (ref 0–0.7)
EOSINOPHIL NFR BLD AUTO: 0.6 % (ref 0–10)
GLUCOSE SERPL-MCNC: 93 MG/DL (ref 83–110)
HGB BLD-MCNC: 11 G/DL (ref 12–16)
LYMPHOCYTES # BLD: 1 THOU/UL (ref 1.2–3.4)
LYMPHOCYTES NFR BLD AUTO: 8.7 % (ref 21–51)
MAGNESIUM SERPL-MCNC: 1.4 MG/DL (ref 1.6–2.6)
MCH RBC QN AUTO: 31.7 PG (ref 27–31)
MCV RBC AUTO: 96.7 FL (ref 78–98)
MONOCYTES # BLD AUTO: 1 THOU/UL (ref 0.11–0.59)
MONOCYTES NFR BLD AUTO: 8.7 % (ref 0–10)
NEUTROPHILS # BLD AUTO: 9.2 THOU/UL (ref 1.4–6.5)
NEUTROPHILS NFR BLD AUTO: 81.8 % (ref 42–75)
PLATELET # BLD AUTO: 197 THOU/UL (ref 130–400)
POTASSIUM SERPL-SCNC: 3.4 MMOL/L (ref 3.5–5.1)
RBC # BLD AUTO: 3.48 MILL/UL (ref 4.2–5.4)
SODIUM SERPL-SCNC: 141 MMOL/L (ref 136–145)
WBC # BLD AUTO: 11.3 THOU/UL (ref 4.8–10.8)

## 2019-06-27 RX ADMIN — CEFTRIAXONE SCH MLS: 1 INJECTION, POWDER, FOR SOLUTION INTRAMUSCULAR; INTRAVENOUS at 21:25

## 2019-06-27 RX ADMIN — LACTOBACILLUS ACIDOPH-L.BULGARICUS 1 MILLION CELL CHEWABLE TABLET SCH TAB: at 09:24

## 2019-06-27 NOTE — PDOC.FM
- Subjective


Subjective: 





NAEO. Patient is eating, drinking, voiding, stooling well. She is AO only to 

self, states she was planning to go fishing. She denies chest pain or SOB, or 

abdominal pain. Denies dysuria. Reoriented the patient to place and time.





- Objective


Vital Signs & Weight: 


 Vital Signs (12 hours)











  Temp Pulse Resp BP Pulse Ox


 


 06/27/19 07:41  98.1 F  73  12  165/83 H  95


 


 06/27/19 03:38  98 F  86  17  149/76 H  95


 


 06/26/19 23:29  98.5 F  82  14  115/59 L  95








 Weight











Weight                         90.718 kg














I&O: 


 











 06/26/19 06/27/19 06/28/19





 06:59 06:59 06:59


 


Intake Total  1230 


 


Balance  1230 











Result Diagrams: 


 06/27/19 04:42





 06/27/19 04:42





Phys Exam





- Physical Examination


Constitutional: NAD


HEENT: PERRLA, moist MMs


Respiratory: no wheezing, no rhonchi


fine rhales bilat LL


Cardiovascular: RRR, no significant murmur


Gastrointestinal: soft, non-tender, no distention, positive bowel sounds


Musculoskeletal: no edema, pulses present


hemiparesis on Rt side s/p CVA


Deviation from normal: Patient appears confused, AOx1 to self


Skin: normal turgor, cap refill <2 seconds





Dx/Plan


(1) Sepsis


Code(s): A41.9 - SEPSIS, UNSPECIFIED ORGANISM   Status: Acute   





(2) Elevated lactic acid level


Code(s): R79.89 - OTHER SPECIFIED ABNORMAL FINDINGS OF BLOOD CHEMISTRY   Status

: Resolved   





(3) Recurrent UTI


Code(s): N39.0 - URINARY TRACT INFECTION, SITE NOT SPECIFIED   Status: Acute   





(4) Sepsis secondary to UTI


Code(s): A41.9 - SEPSIS, UNSPECIFIED ORGANISM; N39.0 - URINARY TRACT INFECTION, 

SITE NOT SPECIFIED   Status: Acute   





(5) Fecal impaction


Code(s): K56.41 - FECAL IMPACTION   Status: Resolved   





(6) Hypokalemia


Code(s): E87.6 - HYPOKALEMIA   Status: Acute   





(7) Metabolic acidosis


Code(s): E87.2 - ACIDOSIS   Status: Resolved   





(8) Nephrolithiasis


Status: Acute   





(9) Physical deconditioning


Code(s): R53.81 - OTHER MALAISE   Status: Chronic   





(10) Right sided weakness


Code(s): R53.1 - WEAKNESS   Status: Chronic   





(11) UTI (urinary tract infection)


Status: Acute   


Qualifiers: 


 





(12) Chronic constipation


Code(s): K59.09 - OTHER CONSTIPATION   Status: Chronic   





(13) Dyslipidemia


Code(s): E78.5 - HYPERLIPIDEMIA, UNSPECIFIED   Status: Chronic   





(14) H/O traumatic brain injury


Code(s): Z87.820 - PERSONAL HISTORY OF TRAUMATIC BRAIN INJURY   Status: Chronic

   





(15) HTN (hypertension)


Code(s): I10 - ESSENTIAL (PRIMARY) HYPERTENSION   Status: Chronic   


Qualifiers: 


 





(16) History of MI (myocardial infarction)


Code(s): I25.2 - OLD MYOCARDIAL INFARCTION   Status: Chronic   





(17) Obesity (BMI 30.0-34.9)


Code(s): E66.9 - OBESITY, UNSPECIFIED   Status: Chronic   





- Plan


Plan: 





#sepsis 2/2 UTI


-tachycardic at 113, WBC at 26, UA consistent with UTI (LE, WBCs)


-procal elevated to 6


-CT abd with ureteral dilation, passed stone in bladder, infection could be due 

to recent passage renal stone vs bedridden status vs other


-[facility] consulted, urology. See below under nephrolithiasis)=


-s/p gent & rocephin in ED; discontinue gent, Continue rocephin while awaiting 

results blood and urine cultures. Past infections have been sensitive to gent


-Discontinue IV fluids





#UTI


-see above





#nephrolithiasis


-ureteral dilation, 7mm stone in bladder


-[facility] consulted, appreciate recommendations


   -suspects chronic colonization. 


   -Discussed more aggressive treatment options with patients family to be 

considered


   -Continue current antibiotic regimen until sensitivities result





#constipation, resolved


-rectal distension with stool impaction seen on CT abd


-had BM after enema





#hypokalemia


-back down to 3.4. one time potassium tab


-check mag and phos





#hx of recurrent UTIs


-consider abx ppx





#hx of recurrent nephrolithiasis


-MD aware





#NG met acidosis, resolved





#Hyperlactatemia, resolved





#hx of TBI with right sided residual deficits





#cHTN 


-home meds





#dyslipidemia


-home meds





#obesity


-MD aware





#cad s/p MI


-negative trops, no EKG changes





dvt ppx: IVC filter, lovenox


gi ppx: pantoprazole


dispo: >2 midnights











Addendum - Attending





- Attending Attestation


Date/Time: 06/27/19 7319





I personally evaluated the patient and discussed the management with Dr. Saleem.


I agree with the History, Examination, Assessment and Plan documented above 

with any addition or exceptions noted below.
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
## 2019-06-27 NOTE — PRG
DATE OF SERVICE:  06/27/2019



I am seeing Mrs. Matute here in room #3308 at [facility].  Her vital signs

have been stable.  She has been afebrile.  Her daughters are not with her today, but

she says she has been feeling fine.  She is not tachycardic.  She is not

hypotensive, and she is not hypoxic.  Her white blood cell count is down to 11.3.

Microbiology is actually growing out a presumptive Enterococcus in her urine.  Her

blood cultures are negative.  This would be having a different organism than she has

had and is one that probably the Rocephin would not cover.  Of interest is the fact

that she has had a white blood cell count that has normalized and is clinically

doing well despite possibly not being on the correct antibiotics.  On reviewing her

emergency room visit note, it appears to be received Rocephin and gentamicin, cannot

see that she received any vancomycin in the ER.  I think for the time being, with

her doing so, I probably would not give her any vancomycin.  Let see what her blood

culture shows in final and see what her final urine culture shows, make sure it does

not grow any other organism.  She looks fine and does not appear toxic and does not

appear septic.  Creatinine is normal.  Lactic acid is normal.  White count became

normal.  We will follow along with you.  I will talk with hopefully her daughters

tomorrow. 







Job ID:  [id number]

## 2019-06-28 VITALS — SYSTOLIC BLOOD PRESSURE: 153 MMHG | DIASTOLIC BLOOD PRESSURE: 85 MMHG

## 2019-06-28 VITALS — TEMPERATURE: 98.5 F

## 2019-06-28 LAB
ANION GAP SERPL CALC-SCNC: 13 MMOL/L (ref 10–20)
BASOPHILS # BLD AUTO: 0 THOU/UL (ref 0–0.2)
BASOPHILS NFR BLD AUTO: 0.2 % (ref 0–1)
BUN SERPL-MCNC: 7 MG/DL (ref 9.8–20.1)
CALCIUM SERPL-MCNC: 8.9 MG/DL (ref 7.8–10.44)
CHLORIDE SERPL-SCNC: 106 MMOL/L (ref 98–107)
CO2 SERPL-SCNC: 27 MMOL/L (ref 23–31)
CREAT CL PREDICTED SERPL C-G-VRATE: 118 ML/MIN (ref 70–130)
EOSINOPHIL # BLD AUTO: 0.1 THOU/UL (ref 0–0.7)
EOSINOPHIL NFR BLD AUTO: 0.9 % (ref 0–10)
GLUCOSE SERPL-MCNC: 95 MG/DL (ref 83–110)
HGB BLD-MCNC: 11.1 G/DL (ref 12–16)
LYMPHOCYTES # BLD: 1.2 THOU/UL (ref 1.2–3.4)
LYMPHOCYTES NFR BLD AUTO: 16.4 % (ref 21–51)
MAGNESIUM SERPL-MCNC: 1.9 MG/DL (ref 1.6–2.6)
MCH RBC QN AUTO: 31.7 PG (ref 27–31)
MCV RBC AUTO: 95.7 FL (ref 78–98)
MONOCYTES # BLD AUTO: 0.7 THOU/UL (ref 0.11–0.59)
MONOCYTES NFR BLD AUTO: 9.3 % (ref 0–10)
NEUTROPHILS # BLD AUTO: 5.5 THOU/UL (ref 1.4–6.5)
NEUTROPHILS NFR BLD AUTO: 73.3 % (ref 42–75)
PLATELET # BLD AUTO: 206 THOU/UL (ref 130–400)
POTASSIUM SERPL-SCNC: 3.5 MMOL/L (ref 3.5–5.1)
RBC # BLD AUTO: 3.51 MILL/UL (ref 4.2–5.4)
SODIUM SERPL-SCNC: 142 MMOL/L (ref 136–145)
WBC # BLD AUTO: 7.6 THOU/UL (ref 4.8–10.8)

## 2019-06-28 RX ADMIN — LACTOBACILLUS ACIDOPH-L.BULGARICUS 1 MILLION CELL CHEWABLE TABLET SCH TAB: at 09:53

## 2019-06-28 NOTE — PDOC.FM
- Subjective


Subjective: 





Afebrile overnight. Patient denies CP or SOB. Nursing reports she has had good 

UOP. She also had a BM yesterday after the enema. 





- Objective


Vital Signs & Weight: 


 Vital Signs (12 hours)











  Temp Pulse Resp BP Pulse Ox


 


 06/28/19 07:21  98.3 F  79  18  166/94 H  94 L


 


 06/28/19 03:04  98.8 F  85  18  147/83 H  93 L


 


 06/27/19 23:26  99.2 F  71  18  147/69 H  93 L








 Weight











Weight                         90.718 kg














I&O: 


 











 06/27/19 06/28/19 06/29/19





 06:59 06:59 06:59


 


Intake Total 1230 1370 


 


Balance 1230 1370 











Result Diagrams: 


 06/28/19 04:16





 06/28/19 04:16





Phys Exam





- Physical Examination


Constitutional: NAD


HEENT: PERRLA, moist MMs


Respiratory: no wheezing, clear to auscultation bilateral


Cardiovascular: RRR, no significant murmur


Diffusely tender to palpation, mild guarding, no rebound +BS


Musculoskeletal: no edema, pulses present


weakness on Rt side, stable


Psychiatric: normal affect


Skin: no rash, normal turgor





Dx/Plan


(1) Sepsis


Code(s): A41.9 - SEPSIS, UNSPECIFIED ORGANISM   Status: Acute   





(2) Elevated lactic acid level


Code(s): R79.89 - OTHER SPECIFIED ABNORMAL FINDINGS OF BLOOD CHEMISTRY   Status

: Resolved   





(3) Recurrent UTI


Code(s): N39.0 - URINARY TRACT INFECTION, SITE NOT SPECIFIED   Status: Acute   





(4) Sepsis secondary to UTI


Code(s): A41.9 - SEPSIS, UNSPECIFIED ORGANISM; N39.0 - URINARY TRACT INFECTION, 

SITE NOT SPECIFIED   Status: Acute   





(5) Fecal impaction


Code(s): K56.41 - FECAL IMPACTION   Status: Resolved   





(6) Hypokalemia


Code(s): E87.6 - HYPOKALEMIA   Status: Acute   





(7) Metabolic acidosis


Code(s): E87.2 - ACIDOSIS   Status: Resolved   





(8) Nephrolithiasis


Status: Acute   





(9) Physical deconditioning


Code(s): R53.81 - OTHER MALAISE   Status: Chronic   





(10) Right sided weakness


Code(s): R53.1 - WEAKNESS   Status: Chronic   





(11) UTI (urinary tract infection)


Status: Acute   


Qualifiers: 


 





(12) Chronic constipation


Code(s): K59.09 - OTHER CONSTIPATION   Status: Chronic   





(13) Dyslipidemia


Code(s): E78.5 - HYPERLIPIDEMIA, UNSPECIFIED   Status: Chronic   





(14) H/O traumatic brain injury


Code(s): Z87.820 - PERSONAL HISTORY OF TRAUMATIC BRAIN INJURY   Status: Chronic

   





(15) HTN (hypertension)


Code(s): I10 - ESSENTIAL (PRIMARY) HYPERTENSION   Status: Chronic   


Qualifiers: 


 





(16) History of MI (myocardial infarction)


Code(s): I25.2 - OLD MYOCARDIAL INFARCTION   Status: Chronic   





(17) Obesity (BMI 30.0-34.9)


Code(s): E66.9 - OBESITY, UNSPECIFIED   Status: Chronic   





- Plan


Plan: 





#sepsis 2/2 UTI


-tachycardic at 113, WBC at 26, UA consistent with UTI (LE, WBCs)


-procal elevated to 6


-CT abd with ureteral dilation, passed stone in bladder, infection could be due 

to recent passage renal stone vs bedridden status vs other


-[facility] consulted, urology. See below under nephrolithiasis


-On rocephin, WBC improving. 


-Blood cultures NGTD


-U cx grew E faecalis, >100,000. Resistant to tetracycline only. Plan to 

discharge on Levaquin for 7 days





Diffuse abdominal tenderness


-In all quadrants, upper and lower. However BS+, a nd Eating/drinking, voiding/

stooling well.


-Good UOP. Bladder scan showed 100 ml.


-possibly 2/2 constipation vs UTI. Scheduled constipation meds.





#UTI


-see above





#nephrolithiasis


-ureteral dilation, 7mm stone in bladder


-[facility] consulted, appreciate recommendations


   -suspects chronic colonization. 


   -Discussed more aggressive treatment options with patients family to be 

considered





#constipation, resolved


-rectal distension with stool impaction seen on CT abd


-had BM after enema


-Schedule miralax, sen/doc





#hypokalemia, resolved


-Mag replaced yesterday. K 3.5 today.





#hx of recurrent UTIs


-consider abx ppx





#hx of recurrent nephrolithiasis


-MD aware





#NG met acidosis, resolved





#Hyperlactatemia, resolved





#hx of TBI with right sided residual deficits





#cHTN 


-home meds





#dyslipidemia


-home meds





#obesity


-MD aware





#cad s/p MI


-negative trops, no EKG changes





dvt ppx: IVC filter, lovenox


gi ppx: pantoprazole


dispo: likely home today








Addendum - Attending





- Attending Attestation


Date/Time: 06/28/19 2745





I personally evaluated the patient and discussed the management with Dr. Saleem.


I agree with the History, Examination, Assessment and Plan documented above 

with any addition or exceptions noted below.

## 2019-06-28 NOTE — PRG
DATE OF SERVICE:  06/28/2019



The patient is seen in room #3308.  Vital signs remained stable.  White count is

normal.  Microbiology showed greater than 100,000 Enterococcus in her urine,

resistant to tetracycline, sensitive to all other antibiotics.  This is a

predominant organism, I cannot see that.  She has actually been on good coverage for

this.  I am not sure that Rocephin would cover it.  I have texted Dr. Gonsales and I

just asked him what his thoughts are on that.  If it happens to cover it, then, it

may be worth sending her home on a few days of Cipro, but otherwise, I think I

tended not to sent her home on any antibiotics as she is defervesced and looks fine

with now normal white count.  I talked with her daughter two days ago, and her

daughter will not be back until later this evening.  I will talk with them and see

if they want to pursue treating the stones more aggressively.  I brought that up

with them when they were here two days ago and we have to see what the decision they

have made.  I certainly think she could go home today. 







Job ID:  [id number]

## 2019-06-28 NOTE — PDOC.EVN
Event Note





- Event Note


Event Note: 





I spoke with her Urologist, Dr. Vegas. Discussed how it was abnormal for her 

WBC to improve with rocephin, as that does not treat E Faecalis. She is likely 

chronically colonized. Patient is clinically improved. Will discharge today, 

will not continue oral antibiotics on discharge.





ESTHELA Saleem MD

## 2019-07-31 ENCOUNTER — HOSPITAL ENCOUNTER (OUTPATIENT)
Dept: HOSPITAL 92 - SDC | Age: 74
Discharge: HOME | End: 2019-07-31
Attending: UROLOGY
Payer: MEDICARE

## 2019-07-31 VITALS — BODY MASS INDEX: 32.3 KG/M2

## 2019-07-31 DIAGNOSIS — N21.0: ICD-10-CM

## 2019-07-31 DIAGNOSIS — E78.00: ICD-10-CM

## 2019-07-31 DIAGNOSIS — N20.2: Primary | ICD-10-CM

## 2019-07-31 LAB
ANION GAP SERPL CALC-SCNC: 12 MMOL/L (ref 10–20)
APTT PPP: 31.5 SEC (ref 22.9–36.1)
BASOPHILS # BLD AUTO: 0 THOU/UL (ref 0–0.2)
BASOPHILS NFR BLD AUTO: 0.3 % (ref 0–1)
BUN SERPL-MCNC: 16 MG/DL (ref 9.8–20.1)
CALCIUM SERPL-MCNC: 9 MG/DL (ref 7.8–10.44)
CHLORIDE SERPL-SCNC: 107 MMOL/L (ref 98–107)
CO2 SERPL-SCNC: 25 MMOL/L (ref 23–31)
CREAT CL PREDICTED SERPL C-G-VRATE: 88 ML/MIN (ref 70–130)
EOSINOPHIL # BLD AUTO: 0.1 THOU/UL (ref 0–0.7)
EOSINOPHIL NFR BLD AUTO: 1.3 % (ref 0–10)
GLUCOSE SERPL-MCNC: 101 MG/DL (ref 83–110)
HGB BLD-MCNC: 13.3 G/DL (ref 12–16)
INR PPP: 1
LYMPHOCYTES # BLD: 1.9 THOU/UL (ref 1.2–3.4)
LYMPHOCYTES NFR BLD AUTO: 28 % (ref 21–51)
MCH RBC QN AUTO: 31.4 PG (ref 27–31)
MCV RBC AUTO: 95.4 FL (ref 78–98)
MONOCYTES # BLD AUTO: 0.5 THOU/UL (ref 0.11–0.59)
MONOCYTES NFR BLD AUTO: 7.1 % (ref 0–10)
NEUTROPHILS # BLD AUTO: 4.3 THOU/UL (ref 1.4–6.5)
NEUTROPHILS NFR BLD AUTO: 63.2 % (ref 42–75)
PLATELET # BLD AUTO: 247 THOU/UL (ref 130–400)
POTASSIUM SERPL-SCNC: 4.3 MMOL/L (ref 3.5–5.1)
PROTHROMBIN TIME: 13.6 SEC (ref 12–14.7)
RBC # BLD AUTO: 4.25 MILL/UL (ref 4.2–5.4)
SODIUM SERPL-SCNC: 140 MMOL/L (ref 136–145)
WBC # BLD AUTO: 6.8 THOU/UL (ref 4.8–10.8)

## 2019-07-31 PROCEDURE — 36415 COLL VENOUS BLD VENIPUNCTURE: CPT

## 2019-07-31 PROCEDURE — 80048 BASIC METABOLIC PNL TOTAL CA: CPT

## 2019-07-31 PROCEDURE — 85730 THROMBOPLASTIN TIME PARTIAL: CPT

## 2019-07-31 PROCEDURE — C1758 CATHETER, URETERAL: HCPCS

## 2019-07-31 PROCEDURE — 85610 PROTHROMBIN TIME: CPT

## 2019-07-31 PROCEDURE — 52332 CYSTOSCOPY AND TREATMENT: CPT

## 2019-07-31 PROCEDURE — 85025 COMPLETE CBC W/AUTO DIFF WBC: CPT

## 2019-07-31 PROCEDURE — 0TF3XZZ FRAGMENTATION IN RIGHT KIDNEY PELVIS, EXTERNAL APPROACH: ICD-10-PCS | Performed by: UROLOGY

## 2019-07-31 PROCEDURE — 50590 FRAGMENTING OF KIDNEY STONE: CPT

## 2019-07-31 NOTE — OP
DATE OF PROCEDURE:  07/31/2019



PREOPERATIVE DIAGNOSIS:  Right renal stones.



POSTOPERATIVE DIAGNOSES:  Right renal stones, right ureteral stones, very small, no

hydro, and numerous bladder stones. 



PROCEDURES PERFORMED:  Cysto, removal of bladder stones, right retrograde, right

stent placement, right extracorporeal shockwave lithotripsy. 



ANESTHESIA:  General.



ESTIMATED BLOOD LOSS:  Not recorded.



FINDINGS:  She had numerous bladder stones all in the 2 to 5 mm rings that were

removed.  The larger ones just removed with a small grasper.  The smaller ones were

just crossed with alligator forceps and then washed out with irrigation fluid.  She

had 2 years of easily seen renal stones.  We treated probably about a 1 to 2 cm area

that was in the upper pole, but the curl of the stent that we placed, was treated

with 2500 shocks at level 4 with good fragmentation, a pause was done.  The stent

that was placed was a 4.8 x 24 cm.  Retrograde study of the ureter was done at that

time.  There was 1 or 2 small filling defects, which were likely small stones, one

of them could be seen at the right ureteral orifice, but pushed back up the ureter

when we placed the wire across it.  There is no hydronephrosis. 



DESCRIPTION OF PROCEDURE:  After obtaining written and verbal consent from the

patient, after receiving IV antibiotics, she was taken to the operating suite and

placed in the supine position on the treatment table.  PlexiPulses were placed on

the lower extremities.  She was given a general anesthetic in an oral intubation.

She was then placed in the dorsal lithotomy position and sterilely prepped and

draped for cystoscopy.  C-arm was used from the ESWL unit for imaging.  Cysto was

performed with a 22-Guyanese sheath.  This was well lubricated and passed under direct

vision through the female urethra and into the urinary bladder with aid of a

30-degree lens, video camera, and monitor.  The numerous small bladder stones that

were visualized were removed intact and then the very small ones were just crushed

in the dust and then irrigated out with irrigation fluid.  We then brought in a

5-Guyanese Pollack catheter where a guidewire was placed through it.  There was a

stone just inside the right ureteral orifice and we placed a wire through this and

we just pushed back up into the ureter.  This allowed us to easily get the Pollack

catheter into the ureter and we used just an inject contrast and follow up the

ureter with the fluoroscopy unit.  There were couple of filling defects, possibly

one was an air bubble, but I think both were probably small nonobstructing ureteral

stones.  The guidewire was placed all the way up into the upper pole calyceal system

and then a stent was placed over that and pushed up into place with aid of a pusher,

so that its proximal end coiled in the upper pole collecting system, as distal end

coiled in the bladder when the wire was removed.  There was an area of stones in the

upper pole that were just adjacent to that stent.  We placed a Tellez catheter as she

is incontinent of urine and we left while she is in for the procedure and then while

she recovers in the recovery room.  This was a 16-Guyanese with 10 mL in the balloon.

She was returned to a supine position.  She was coupled to the lithotripsy unit.

The stones that we were treating were placed in treatment focal point.  Shockwave

was commenced at a low kV and a rate of 60 after couple 100 shocks a few minutes

pause was given and then we increased the kV slowly, therefore left it for use

fluoroscopy intermittently to reposition as necessary and to document stone

fragmentation.  It did appear that this area did change of consistency substantially

suggesting that they did fragment well.  After 2500 shocks, the procedure was

terminated.  She was removed by stretcher to the recovery room after being awakened

and extubated. 







Job ID:  [id number]

## 2019-08-14 ENCOUNTER — HOSPITAL ENCOUNTER (OUTPATIENT)
Dept: HOSPITAL 92 - SDC | Age: 74
Discharge: HOME | End: 2019-08-14
Attending: UROLOGY
Payer: MEDICARE

## 2019-08-14 VITALS — BODY MASS INDEX: 32.3 KG/M2

## 2019-08-14 DIAGNOSIS — I10: ICD-10-CM

## 2019-08-14 DIAGNOSIS — Z79.899: ICD-10-CM

## 2019-08-14 DIAGNOSIS — Z79.2: ICD-10-CM

## 2019-08-14 DIAGNOSIS — I25.10: ICD-10-CM

## 2019-08-14 DIAGNOSIS — N20.0: Primary | ICD-10-CM

## 2019-08-14 DIAGNOSIS — K21.9: ICD-10-CM

## 2019-08-14 PROCEDURE — 0TF3XZZ FRAGMENTATION IN RIGHT KIDNEY PELVIS, EXTERNAL APPROACH: ICD-10-PCS | Performed by: UROLOGY

## 2019-08-14 PROCEDURE — 74018 RADEX ABDOMEN 1 VIEW: CPT

## 2019-08-14 NOTE — RAD
SUPINE ABDOMEN:

 

HISTORY: 

Preop.

 

FINDINGS: 

Right ureteral stent is noted.  Calcifications overlie the right renal outline.  

 

A large amount of stool throughout the colon.  Scattered small bowel gas which appears nonspecific.  


 

IMPRESSION: 

No change when compared to the film of 5/1/2019.

 

POS: FOSTER

## 2019-08-14 NOTE — OP
DATE OF PROCEDURE:  08/14/2019



PREOPERATIVE DIAGNOSIS:  Right renal stone.



POSTOPERATIVE DIAGNOSIS:  Right renal stone.



PROCEDURE PERFORMED:  Right extracorporeal shockwave lithotripsy.



ANESTHESIA:  General.



ESTIMATED BLOOD LOSS:  Not recorded.



FINDINGS:  A 1 x 1.5 cm area of stones in the midpole of the right kidney.  It was

treated with 2500 shocks mostly at level 4 with a few 100 shocks at level 5, but

there did appear to fragment changes, orientation suggesting a good outcome to it. 



DESCRIPTION OF PROCEDURE:  After obtaining written and verbal consent from the

patient, after receiving IV cefepime, she was taken to the operating suite.  She was

placed in a supine position on the treatment table.  PlexiPulses were placed on her

lower extremities and turned on.  She was given a general anesthetic and oral

obturator intubation.  She was coupled to the lithotripsy unit.  The stent was

already in place, so we did not have to place a stent nor do we have to remove the

stent today.  The stones were placed in treatment focal short point and shockwave

therapy was commenced starting a very low kV and a rate of 60.  After a few 100

shocks, a few minute pause was given.  We then went ahead and continued the ESWL at

a rate of 60 per minute and we increased the level 4 and then for a few 100 shocks

often on the level 5.  At the end of the procedure, she was awakened and extubated

and taken by teddy to the recovery room. 







Job ID:  [id number]

## 2019-11-06 ENCOUNTER — HOSPITAL ENCOUNTER (OUTPATIENT)
Dept: HOSPITAL 18 - NAV LABSP | Age: 74
Discharge: HOME | End: 2019-11-06
Payer: MEDICARE

## 2019-11-06 DIAGNOSIS — Z87.440: Primary | ICD-10-CM

## 2019-11-06 PROCEDURE — 87077 CULTURE AEROBIC IDENTIFY: CPT

## 2019-11-06 PROCEDURE — 87186 SC STD MICRODIL/AGAR DIL: CPT

## 2019-11-06 PROCEDURE — 81001 URINALYSIS AUTO W/SCOPE: CPT

## 2019-11-06 PROCEDURE — 87086 URINE CULTURE/COLONY COUNT: CPT

## 2019-11-07 LAB
BACTERIA UR QL AUTO: (no result) HPF
PROT UR STRIP.AUTO-MCNC: (no result) MG/DL
RBC UR QL AUTO: (no result) HPF (ref 0–3)
WBC UR QL AUTO: (no result) HPF (ref 0–3)

## 2020-01-10 ENCOUNTER — HOSPITAL ENCOUNTER (INPATIENT)
Dept: HOSPITAL 92 - ERS | Age: 75
LOS: 3 days | Discharge: HOME | DRG: 853 | End: 2020-01-13
Attending: FAMILY MEDICINE | Admitting: FAMILY MEDICINE
Payer: MEDICARE

## 2020-01-10 VITALS — BODY MASS INDEX: 31.5 KG/M2

## 2020-01-10 DIAGNOSIS — G92: ICD-10-CM

## 2020-01-10 DIAGNOSIS — E78.5: ICD-10-CM

## 2020-01-10 DIAGNOSIS — I25.2: ICD-10-CM

## 2020-01-10 DIAGNOSIS — Z90.710: ICD-10-CM

## 2020-01-10 DIAGNOSIS — A41.51: Primary | ICD-10-CM

## 2020-01-10 DIAGNOSIS — I25.10: ICD-10-CM

## 2020-01-10 DIAGNOSIS — I10: ICD-10-CM

## 2020-01-10 DIAGNOSIS — E87.6: ICD-10-CM
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
## 2020-01-10 DIAGNOSIS — N13.6: ICD-10-CM

## 2020-01-10 DIAGNOSIS — G81.91: ICD-10-CM

## 2020-01-10 DIAGNOSIS — Z87.440: ICD-10-CM

## 2020-01-10 DIAGNOSIS — S06.9X0S: ICD-10-CM

## 2020-01-10 DIAGNOSIS — Z79.899: ICD-10-CM

## 2020-01-10 DIAGNOSIS — K59.09: ICD-10-CM

## 2020-01-10 LAB
ALBUMIN SERPL BCG-MCNC: 3.4 G/DL (ref 3.4–4.8)
ALP SERPL-CCNC: 79 U/L (ref 40–110)
ALT SERPL W P-5'-P-CCNC: 10 U/L (ref 8–55)
ANION GAP SERPL CALC-SCNC: 16 MMOL/L (ref 10–20)
AST SERPL-CCNC: 14 U/L (ref 5–34)
BACTERIA UR QL AUTO: (no result) HPF
BASOPHILS # BLD AUTO: 0 THOU/UL (ref 0–0.2)
BASOPHILS NFR BLD AUTO: 0.2 % (ref 0–1)
BILIRUB SERPL-MCNC: 0.6 MG/DL (ref 0.2–1.2)
BUN SERPL-MCNC: 11 MG/DL (ref 9.8–20.1)
CALCIUM SERPL-MCNC: 8.3 MG/DL (ref 7.8–10.44)
CHLORIDE SERPL-SCNC: 102 MMOL/L (ref 98–107)
CO2 SERPL-SCNC: 23 MMOL/L (ref 23–31)
CREAT CL PREDICTED SERPL C-G-VRATE: 0 ML/MIN (ref 70–130)
EOSINOPHIL # BLD AUTO: 0.1 THOU/UL (ref 0–0.7)
EOSINOPHIL NFR BLD AUTO: 0.5 % (ref 0–10)
GLOBULIN SER CALC-MCNC: 3.5 G/DL (ref 2.4–3.5)
GLUCOSE SERPL-MCNC: 84 MG/DL (ref 83–110)
HGB BLD-MCNC: 13.5 G/DL (ref 12–16)
LEUKOCYTE ESTERASE UR QL STRIP.AUTO: 500 LEU/UL
LIPASE SERPL-CCNC: 6 U/L (ref 8–78)
LYMPHOCYTES # BLD: 1.4 THOU/UL (ref 1.2–3.4)
LYMPHOCYTES NFR BLD AUTO: 12.8 % (ref 21–51)
MAGNESIUM SERPL-MCNC: 1.7 MG/DL (ref 1.6–2.6)
MCH RBC QN AUTO: 31.3 PG (ref 27–31)
MCV RBC AUTO: 94.4 FL (ref 78–98)
MONOCYTES # BLD AUTO: 1 THOU/UL (ref 0.11–0.59)
MONOCYTES NFR BLD AUTO: 9.6 % (ref 0–10)
MUCOUS THREADS UR QL AUTO: (no result) LPF
NEUTROPHILS # BLD AUTO: 8.4 THOU/UL (ref 1.4–6.5)
NEUTROPHILS NFR BLD AUTO: 77 % (ref 42–75)
PLATELET # BLD AUTO: 285 THOU/UL (ref 130–400)
POTASSIUM SERPL-SCNC: 3.2 MMOL/L (ref 3.5–5.1)
PROT UR STRIP.AUTO-MCNC: 100 MG/DL
RBC # BLD AUTO: 4.32 MILL/UL (ref 4.2–5.4)
SODIUM SERPL-SCNC: 138 MMOL/L (ref 136–145)
WBC # BLD AUTO: 10.9 THOU/UL (ref 4.8–10.8)

## 2020-01-10 PROCEDURE — 70450 CT HEAD/BRAIN W/O DYE: CPT

## 2020-01-10 PROCEDURE — 74176 CT ABD & PELVIS W/O CONTRAST: CPT

## 2020-01-10 PROCEDURE — 96361 HYDRATE IV INFUSION ADD-ON: CPT

## 2020-01-10 PROCEDURE — 80048 BASIC METABOLIC PNL TOTAL CA: CPT

## 2020-01-10 PROCEDURE — 87186 SC STD MICRODIL/AGAR DIL: CPT

## 2020-01-10 PROCEDURE — 81003 URINALYSIS AUTO W/O SCOPE: CPT

## 2020-01-10 PROCEDURE — 51701 INSERT BLADDER CATHETER: CPT

## 2020-01-10 PROCEDURE — 36415 COLL VENOUS BLD VENIPUNCTURE: CPT

## 2020-01-10 PROCEDURE — 87077 CULTURE AEROBIC IDENTIFY: CPT

## 2020-01-10 PROCEDURE — 87086 URINE CULTURE/COLONY COUNT: CPT

## 2020-01-10 PROCEDURE — 81015 MICROSCOPIC EXAM OF URINE: CPT

## 2020-01-10 PROCEDURE — 96365 THER/PROPH/DIAG IV INF INIT: CPT

## 2020-01-10 PROCEDURE — 93005 ELECTROCARDIOGRAM TRACING: CPT

## 2020-01-10 PROCEDURE — C1769 GUIDE WIRE: HCPCS

## 2020-01-10 PROCEDURE — 83690 ASSAY OF LIPASE: CPT

## 2020-01-10 PROCEDURE — 83605 ASSAY OF LACTIC ACID: CPT

## 2020-01-10 PROCEDURE — 84484 ASSAY OF TROPONIN QUANT: CPT

## 2020-01-10 PROCEDURE — 80053 COMPREHEN METABOLIC PANEL: CPT

## 2020-01-10 PROCEDURE — 74420 UROGRAPHY RTRGR +-KUB: CPT

## 2020-01-10 PROCEDURE — 71045 X-RAY EXAM CHEST 1 VIEW: CPT

## 2020-01-10 PROCEDURE — 83735 ASSAY OF MAGNESIUM: CPT

## 2020-01-10 PROCEDURE — 85025 COMPLETE CBC W/AUTO DIFF WBC: CPT

## 2020-01-10 PROCEDURE — A4353 INTERMITTENT URINARY CATH: HCPCS

## 2020-01-10 NOTE — RAD
CHEST ONE VIEW:

1/10/20

 

HISTORY: 

Lethargy. 

 

COMPARISON: 

Radiograph 6/25/19.

 

FINDINGS: 

The lungs are mildly hypoinflated with bibasilar atelectatic changes. Old right sided rib fractures. 
Cardiac silhouette and mediastinal contours are similar. No acute focal air space consolidation, pneu
mothorax or effusion. 

 

IMPRESSION: 

No acute intrathoracic abnormality. 

 

POS: HOME

## 2020-01-10 NOTE — CT
CT Brain WO Con:



1/10/2020 5:33 PM



CLINICAL HISTORY: Lethargy and poor appetite.



IMAGING TECHNIQUE: Multiple CT images were obtained of the brain without IV contrast.



COMPARISON: MR the brain dated January 28, 2016 CT brain dated March 27, 2014



FINDINGS:

Brain:  There is stable encephalomalacia involving the lateral aspect of the anterior right temporal 
lobe. Chronic ischemic changes stable appearing. No acute infarct, hemorrhage or hydrocephalus is

present. Extra-axial fluid collection overlying the right frontal convexity is stable.

Ventricles: Normal.  No hydrocephalus..

Skull: Right temporal craniectomy is stable. Right frontal parietal craniotomy is stable. Right front
al oni hole is stable.

Visualized Paranasal sinuses: Clear..

Mastoid air cells:Clear.

Extracranial soft tissues:Normal.



IMPRESSION:



No acute intracranial abnormality. 



Reported By: Robby John 

Electronically Signed:  1/10/2020 7:00 PM
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
## 2020-01-10 NOTE — CT
CT OF THE ABDOMEN AND PELVIS WITHOUT IV CONTRAST



INDICATION: History of urinary tract infection and altered mental status



COMPARISON: CT abdomen and pelvis dated June 25, 2019



FINDINGS: 



This examination is limited for the evaluation of solid organs and vascular structures due to the lac
k of intravenous contrast.



ABDOMEN:



Lung bases: There is bibasilar atelectasis



Liver: No focal lesion.



Gallbladder:  Cholelithiasis



Pancreas: Normal.



Adrenal glands: Normal.



Spleen: Normal.



Kidneys and ureters: There is mild left hydronephrosis. There is a 5 mm calculus within the left mikki
l pelvis. There is a 2 mm calculus within the inferior pole left kidney. There is an 8 mm left

ureteral calculus on image 65 of series 2. Right nephrolithiasis is stable. The large staghorn calcul
us involving the superior pole right kidney is stable appearing.



Vasculature: There are moderate vascular calcifications seen involving the visualized vasculature. Th
ere is stable infrarenal IVC filter.



Lymph nodes:No lymphadenopathy.



Free fluid in abdomen:No free fluid is evident.



PELVIS:



Small and large bowel: There is a prominent amount retained stool within the sigmoid colon and rectum
. There are scattered colonic diverticula without evidence of active diverticulitis.



Appendix:Normal



Bladder: Decompressed. Previously seen bladder calculi are no longer present.



Rectal and perirectal soft tissues:Prominent fecal material distends the rectum.



Reproductive structures: Surgically absent



Free fluid in pelvis: No free fluid is evident.



Lymphadenopathy pelvis: No lymphadenopathy is evident.



Osseous structures: No acute osseous abnormality.  No destructive osteolytic or osteoblastic lesion i
s identified.  There is scattered degenerative and osteoarthritic changes. Stable diffuse

osteopenia. Stable mild endplate compression abnormalities of L1-L4.



Soft tissues:Normal.



IMPRESSION:

1. New 8 mm left ureteral calculus and mild left hydronephrosis.

2. New left nephrolithiasis.

3. Stable right nephrolithiasis.

4. Stable cholelithiasis.

5. Prominent amount of retained stool within the rectum and sigmoid colon.

6. Other chronic findings as above



Reported By: Robby John 

Electronically Signed:  1/10/2020 8:58 PM

## 2020-01-11 LAB
ANION GAP SERPL CALC-SCNC: 13 MMOL/L (ref 10–20)
BASOPHILS # BLD AUTO: 0 THOU/UL (ref 0–0.2)
BASOPHILS NFR BLD AUTO: 0.4 % (ref 0–1)
BUN SERPL-MCNC: 9 MG/DL (ref 9.8–20.1)
CALCIUM SERPL-MCNC: 7.8 MG/DL (ref 7.8–10.44)
CHLORIDE SERPL-SCNC: 106 MMOL/L (ref 98–107)
CO2 SERPL-SCNC: 24 MMOL/L (ref 23–31)
CREAT CL PREDICTED SERPL C-G-VRATE: 119 ML/MIN (ref 70–130)
EOSINOPHIL # BLD AUTO: 0.1 THOU/UL (ref 0–0.7)
EOSINOPHIL NFR BLD AUTO: 0.7 % (ref 0–10)
GLUCOSE SERPL-MCNC: 78 MG/DL (ref 83–110)
HGB BLD-MCNC: 11.2 G/DL (ref 12–16)
LYMPHOCYTES # BLD: 1.5 THOU/UL (ref 1.2–3.4)
LYMPHOCYTES NFR BLD AUTO: 18 % (ref 21–51)
MCH RBC QN AUTO: 30.9 PG (ref 27–31)
MCV RBC AUTO: 93.6 FL (ref 78–98)
MONOCYTES # BLD AUTO: 0.8 THOU/UL (ref 0.11–0.59)
MONOCYTES NFR BLD AUTO: 9.7 % (ref 0–10)
NEUTROPHILS # BLD AUTO: 5.8 THOU/UL (ref 1.4–6.5)
NEUTROPHILS NFR BLD AUTO: 71.2 % (ref 42–75)
PLATELET # BLD AUTO: 254 THOU/UL (ref 130–400)
POTASSIUM SERPL-SCNC: 2.7 MMOL/L (ref 3.5–5.1)
RBC # BLD AUTO: 3.64 MILL/UL (ref 4.2–5.4)
SODIUM SERPL-SCNC: 140 MMOL/L (ref 136–145)
WBC # BLD AUTO: 8.1 THOU/UL (ref 4.8–10.8)

## 2020-01-11 RX ADMIN — Medication SCH ML: at 20:12

## 2020-01-11 RX ADMIN — LACTOBACILLUS ACIDOPH-L.BULGARICUS 1 MILLION CELL CHEWABLE TABLET SCH TAB: at 08:28

## 2020-01-11 RX ADMIN — Medication SCH ML: at 15:32

## 2020-01-11 NOTE — PDOC.FM
Addendum entered and electronically signed by Varinder Cardenas DO  01/11/20 07:20

: 





Hypokalemia


-2.7 this morning, attempted IV replacement overnight but pt did not tolerate, 

PO form given successfully, will give BID today and trend in AM





Original Note:








- Subjective


Subjective: 


Pt currently w/o pain this morning. Is alert and oriented per baseline. 

Conversing appropriately. No complaints.





- Objective


Vital Signs & Weight: 


 Vital Signs (12 hours)











  Temp Pulse Resp BP Pulse Ox


 


 01/11/20 04:29  98.6 F  94  16  171/79 H  94 L


 


 01/10/20 23:15  99.2 F  83  18  142/66 H  95








 Weight











Weight                         88.541 kg














I&O: 


 











 01/09/20 01/10/20 01/11/20





 06:59 06:59 06:59


 


Intake Total   240


 


Balance   240











Result Diagrams: 


 01/11/20 04:09





 01/11/20 04:09





Phys Exam





- Physical Examination


Constitutional: NAD


HEENT: sclera anicteric


Dry MM


Neck: full ROM


Respiratory: no wheezing, no rales, no rhonchi, clear to auscultation bilateral


Cardiovascular: RRR, no significant murmur, no rub


Gastrointestinal: soft, non-tender


Musculoskeletal: no edema, pulses present


Chronic right sided weakness present


Psychiatric: normal affect


Deviation from normal: Alert, oriented to self and place


Skin: no rash, cap refill <2 seconds





Dx/Plan


(1) Complicated UTI (urinary tract infection)


Code(s): N39.0 - URINARY TRACT INFECTION, SITE NOT SPECIFIED   Status: Acute   





(2) Hypokalemia


Code(s): E87.6 - HYPOKALEMIA   Status: Acute   





(3) Nephrolithiasis


Status: Acute   





(4) H/O traumatic brain injury


Code(s): Z87.820 - PERSONAL HISTORY OF TRAUMATIC BRAIN INJURY   Status: Chronic

   





- Plan


Plan: 


Patient is a 75 y/o  female who presents with her daughter for 24H of 

fever and AMS.





Complicated UTI -Nephrolithiasis / Ureterolithiasis


-UA: +RBCs, WBCs, Nitrites, LEs, Bacteria 


-UCx: Pending


-CT ABD/Pelvis: Chronic nephrolithiasis w/ new 8 mm left uretal stone


-Per chart review, patient has had multiple complicated UTIs 2/2 E. coli, 

Enterobacter and Pseudomonas in the past


-s/p Ceftriaxone 1 g IV in ED - will switch to Zosyn 4.5 mg IV Q6H for 

increased coverage


-LR @ 130 ml/hr


-Initiate tamsulosin this morning


-Will consult Urology this morning regarding possible lithotripsy vs other 

intervention





Elevated blood pressures


-Multiple elevated BP readings, will continue to monitor for organic disease vs 

pain reaction


-Patient does not take HTN medications at home


-Consider initiating antihypertensive regimen if patient develops severe-range 

pressures or maintains elevated pressures





HLD


-Continue home medication regimen





Constipation


-Continue home medication regimen





Code: Full


Diet: HH


Activity: Strict Bedrest


VTE PPx: Lovenox 40 mg SC





Dispo: Patient is currently stable and admitted to the Medical Floor for 

treatment of Complicated UTI 2/2 Ureterolithiasis. Await UCx and adjust 

antibiotic regimen as needed. Will consult urology later this morning.





Addendum - Attending





- Attending Attestation


Date/Time: 01/11/20 1126





I personally evaluated the patient and discussed the management with Dr. Cardenas


I agree with the History, Examination, Assessment and Plan documented above 

with any addition or exceptions noted below.

## 2020-01-12 LAB
ANION GAP SERPL CALC-SCNC: 9 MMOL/L (ref 10–20)
BASOPHILS # BLD AUTO: 0 THOU/UL (ref 0–0.2)
BASOPHILS NFR BLD AUTO: 0.6 % (ref 0–1)
BUN SERPL-MCNC: 6 MG/DL (ref 9.8–20.1)
CALCIUM SERPL-MCNC: 7.9 MG/DL (ref 7.8–10.44)
CHLORIDE SERPL-SCNC: 110 MMOL/L (ref 98–107)
CO2 SERPL-SCNC: 27 MMOL/L (ref 23–31)
CREAT CL PREDICTED SERPL C-G-VRATE: 115 ML/MIN (ref 70–130)
EOSINOPHIL # BLD AUTO: 0.1 THOU/UL (ref 0–0.7)
EOSINOPHIL NFR BLD AUTO: 2.1 % (ref 0–10)
GLUCOSE SERPL-MCNC: 98 MG/DL (ref 83–110)
HGB BLD-MCNC: 10.9 G/DL (ref 12–16)
LYMPHOCYTES # BLD: 1.5 THOU/UL (ref 1.2–3.4)
LYMPHOCYTES NFR BLD AUTO: 21.5 % (ref 21–51)
MAGNESIUM SERPL-MCNC: 1.7 MG/DL (ref 1.6–2.6)
MCH RBC QN AUTO: 30.9 PG (ref 27–31)
MCV RBC AUTO: 94.5 FL (ref 78–98)
MONOCYTES # BLD AUTO: 0.6 THOU/UL (ref 0.11–0.59)
MONOCYTES NFR BLD AUTO: 8.7 % (ref 0–10)
NEUTROPHILS # BLD AUTO: 4.7 THOU/UL (ref 1.4–6.5)
NEUTROPHILS NFR BLD AUTO: 67.1 % (ref 42–75)
PLATELET # BLD AUTO: 280 THOU/UL (ref 130–400)
POTASSIUM SERPL-SCNC: 3.3 MMOL/L (ref 3.5–5.1)
RBC # BLD AUTO: 3.53 MILL/UL (ref 4.2–5.4)
SODIUM SERPL-SCNC: 143 MMOL/L (ref 136–145)
WBC # BLD AUTO: 7.1 THOU/UL (ref 4.8–10.8)

## 2020-01-12 PROCEDURE — 0T778DZ DILATION OF LEFT URETER WITH INTRALUMINAL DEVICE, VIA NATURAL OR ARTIFICIAL OPENING ENDOSCOPIC: ICD-10-PCS | Performed by: UROLOGY

## 2020-01-12 PROCEDURE — BT1FZZZ FLUOROSCOPY OF LEFT KIDNEY, URETER AND BLADDER: ICD-10-PCS | Performed by: UROLOGY

## 2020-01-12 RX ADMIN — Medication SCH: at 07:10

## 2020-01-12 RX ADMIN — LACTOBACILLUS ACIDOPH-L.BULGARICUS 1 MILLION CELL CHEWABLE TABLET SCH: at 09:00

## 2020-01-12 RX ADMIN — Medication SCH ML: at 20:54
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
## 2020-01-12 NOTE — RAD
XR IVP Retrograde



History: Stent placement



Comparison: IVP retrograde August 20, 2019



Findings: Single spot image demonstrates a wire and catheter projecting over the expected location of
 the left ureter. Large 5 stool within the rectum.



Impression: Fluoroscopy for procedural purposes.



Reported By: Manan Dillon 

Electronically Signed:  1/12/2020 9:07 AM

## 2020-01-12 NOTE — PDOC.FM
- Subjective


Subjective: 


Pt states she is feeling well this morning. Denies any flank or back pain 

currently. Continues to be agreeable for cystoscopy procedure today. No events 

overnight.





- Objective


Vital Signs & Weight: 


 Vital Signs (12 hours)











  Temp Pulse Resp BP Pulse Ox


 


 01/12/20 04:00  98.4 F  77  16  141/71 H  92 L


 


 01/12/20 00:00  98.2 F  74  16  167/60 H  92 L


 


 01/11/20 20:00  98.4 F  87  16  138/65  95








 Weight











Admit Weight                   88.541 kg


 


Weight                         88.541 kg














I&O: 


 











 01/10/20 01/11/20 01/12/20





 06:59 06:59 06:59


 


Intake Total  720 1500


 


Balance  720 1500











Result Diagrams: 


 01/12/20 04:34





 01/12/20 04:34





Phys Exam





- Physical Examination


Constitutional: NAD


HEENT: moist MMs, sclera anicteric


Neck: full ROM


Respiratory: no wheezing, no rales, no rhonchi, clear to auscultation bilateral


Cardiovascular: RRR, no significant murmur, no rub


Gastrointestinal: soft, non-tender, no distention


Musculoskeletal: pulses present


Chronic right sided hemiparesis


Psychiatric: normal affect


Deviation from normal: A&Ox1


Skin: no rash, cap refill <2 seconds





Dx/Plan


(1) Complicated UTI (urinary tract infection)


Code(s): N39.0 - URINARY TRACT INFECTION, SITE NOT SPECIFIED   Status: Acute   





(2) Hypokalemia


Code(s): E87.6 - HYPOKALEMIA   Status: Acute   





(3) Nephrolithiasis


Status: Acute   





(4) H/O traumatic brain injury


Code(s): Z87.820 - PERSONAL HISTORY OF TRAUMATIC BRAIN INJURY   Status: Chronic

   





- Plan


Plan: 


Patient is a 75 y/o  female who presents with her daughter for 24H of 

fever and AMS.





Complicated UTI -Nephrolithiasis / Ureterolithiasis


-UA: +RBCs, WBCs, Nitrites, LEs, Bacteria 


-UCx: E. coli - sensative to zosyn and bactrim, will plan switch to PO 

following procedure


-CT ABD/Pelvis: Chronic nephrolithiasis w/ new 8 mm left uretal stone


-Per chart review, patient has had multiple complicated UTIs 2/2 E. coli, 

Enterobacter and Pseudomonas in the past


-Zosyn 4.5 mg IV Q6H


-LR @ 130 ml/hr


-Initiate tamsulosin this morning


-Urology: Dr. Carlos was consulted yesterday, plans to take pt to the OR today 

for cystoscopy and likely stent





Hypokalemia


- 2.7 yesterday, 40meq BID yesterday, 3.3 this morning


- Continue to replace as needed





Elevated blood pressures


-Multiple elevated BP readings, will continue to monitor for organic disease vs 

pain reaction


-Patient does not take HTN medications at home


-Continues to have elevated pressures, will monitor post operatively today, if 

remains elevated will require tx





HLD


-Continue home medication regimen





Constipation


-Continue home medication regimen





Code: Full


Diet: HH


Activity: Strict Bedrest


VTE PPx: Lovenox 40 mg SC





Dispo: Patient is currently stable and admitted to the Medical Floor for 

treatment of Complicated UTI 2/2 Ureterolithiasis. Await UCx and adjust 

antibiotic regimen as needed. Cystoscopy procedure scheduled today.





Addendum - Attending





- Attending Attestation


Date/Time: 01/12/20 1023





I personally evaluated the patient and discussed the management with Dr. Cardenas


I agree with the History, Examination, Assessment and Plan documented above 

with any addition or exceptions noted below.


S/p stent placement recovering in room discussed care plan with patients son. 

Can de-escalate antibiotic growing E.Coli >100,000 cfu.

## 2020-01-12 NOTE — OP
DATE OF PROCEDURE:  01/12/2020



SERVICE:  Urology.



PREOPERATIVE DIAGNOSES:  

1. Bilateral nephrolithiasis with a left ureteral stone.

2. Urinary tract infection.



POSTOPERATIVE DIAGNOSES:  

1. Bilateral nephrolithiasis with a left ureteral stone.

2. Urinary tract infection.



PROCEDURE PERFORMED:  Cystoscopy with left ureteral stent placement.



INDICATIONS FOR PROCEDURE:  Ms. Matute is a 74-year-old white female with traumatic

brain injury, who has bilateral nephrolithiasis and an 8 mm left ureteral stone.

She had evidence of urinary tract infection and was admitted with a fever of 101.

She is being brought in for ureteral stent placement and is currently being treated

by the Family Medicine Service. 



DESCRIPTION OF PROCEDURE:  After identification of armband and verification of

consent, the patient was brought back to the operating room where she underwent

general anesthesia with an LMA.  She was placed in a dorsal lithotomy position and

prepped and draped in the usual sterile fashion.  After appropriate time-out, a

lubricated 22-[place] rigid cystoscope was introduced per urethra into the bladder.

The bladder was relatively unremarkable other than some mild inflammatory changes.

There was moderate trigonitis and some squamous metaplasia in the trigone.  Both

ureters were in the orthotopic location.  The left ureteral orifice was cannulated

with a 0.035 Sensor wire up to the level of renal pelvis.  A 6 x 26 double-J stent

was then advanced over the Sensor wire up to the level of renal pelvis.  The wire

was removed leaving a good curl in the kidney and a good curl in the bladder.  Of

note, the stones were radiolucent and were not visible on the plain x-ray.  There

was noted to be a significant amount of stool in the patient's rectum and throughout

her colon indicating chronic constipation.  The bladder was emptied and the

cystoscope was removed.  The patient was then awakened and taken to PACU for

recovery in stable condition. 



COMPLICATIONS:  None.



ESTIMATED BLOOD LOSS:  Minimal.



RETAINED TUBES AND DRAINS:  A 6 x 26 double-J stent on the left with no string.



SPECIMENS:  None.



DISPOSITION:  The patient will be admitted back to the Family Medicine Service.

Once she is adequately treated for her infection, she can be discharged and should

follow up with Dr. Dileep Vegas, who is her primary urologist. 







Job ID:  [id number]

## 2020-01-13 VITALS — SYSTOLIC BLOOD PRESSURE: 165 MMHG | TEMPERATURE: 97.4 F | DIASTOLIC BLOOD PRESSURE: 71 MMHG

## 2020-01-13 LAB
ANION GAP SERPL CALC-SCNC: 9 MMOL/L (ref 10–20)
BASOPHILS # BLD AUTO: 0 THOU/UL (ref 0–0.2)
BASOPHILS NFR BLD AUTO: 0.5 % (ref 0–1)
BUN SERPL-MCNC: (no result) MG/DL (ref 9.8–20.1)
CALCIUM SERPL-MCNC: 7.9 MG/DL (ref 7.8–10.44)
CHLORIDE SERPL-SCNC: 105 MMOL/L (ref 98–107)
CO2 SERPL-SCNC: 30 MMOL/L (ref 23–31)
CREAT CL PREDICTED SERPL C-G-VRATE: 124 ML/MIN (ref 70–130)
EOSINOPHIL # BLD AUTO: 0.2 THOU/UL (ref 0–0.7)
EOSINOPHIL NFR BLD AUTO: 2.6 % (ref 0–10)
GLUCOSE SERPL-MCNC: 111 MG/DL (ref 83–110)
HGB BLD-MCNC: 11 G/DL (ref 12–16)
LYMPHOCYTES # BLD: 1.5 THOU/UL (ref 1.2–3.4)
LYMPHOCYTES NFR BLD AUTO: 18.5 % (ref 21–51)
MCH RBC QN AUTO: 31.2 PG (ref 27–31)
MCV RBC AUTO: 94.6 FL (ref 78–98)
MONOCYTES # BLD AUTO: 0.8 THOU/UL (ref 0.11–0.59)
MONOCYTES NFR BLD AUTO: 9.6 % (ref 0–10)
NEUTROPHILS # BLD AUTO: 5.4 THOU/UL (ref 1.4–6.5)
NEUTROPHILS NFR BLD AUTO: 68.8 % (ref 42–75)
PLATELET # BLD AUTO: 301 THOU/UL (ref 130–400)
POTASSIUM SERPL-SCNC: 3.4 MMOL/L (ref 3.5–5.1)
RBC # BLD AUTO: 3.52 MILL/UL (ref 4.2–5.4)
SODIUM SERPL-SCNC: 141 MMOL/L (ref 136–145)
WBC # BLD AUTO: 7.9 THOU/UL (ref 4.8–10.8)

## 2020-01-13 RX ADMIN — LACTOBACILLUS ACIDOPH-L.BULGARICUS 1 MILLION CELL CHEWABLE TABLET SCH TAB: at 09:54

## 2020-01-13 RX ADMIN — Medication SCH ML: at 09:54

## 2020-01-13 NOTE — PDOC.FM
- Subjective


Subjective: 


Pt doing well, no events overnight. Denies any flank pain, dysuria, fevers, or 

chills. Denies any post operative complications or events.





- Objective


Vital Signs & Weight: 


 Vital Signs (12 hours)











  Temp Pulse Resp BP Pulse Ox


 


 01/13/20 04:00  98.7 F  90  18  142/63 H  92 L


 


 01/13/20 00:00  98.9 F  83  20  136/60  92 L


 


 01/12/20 20:00  98.9 F  80  18  162/68 H  93 L








 Weight











Admit Weight                   88.541 kg


 


Weight                         88.541 kg














I&O: 


 











 01/11/20 01/12/20 01/13/20





 06:59 06:59 06:59


 


Intake Total 720 3410 1840


 


Balance 720 3410 1840











Result Diagrams: 


 01/13/20 04:31





 01/13/20 04:31





Phys Exam





- Physical Examination


Constitutional: NAD


HEENT: moist MMs, sclera anicteric


Neck: full ROM


Respiratory: no wheezing, no rales, no rhonchi, clear to auscultation bilateral


Cardiovascular: RRR, no significant murmur


Gastrointestinal: soft, non-tender, no distention, positive bowel sounds


Musculoskeletal: no edema, pulses present


Chronic right sided deficit


Psychiatric: normal affect, A&O x 3


Skin: no rash, cap refill <2 seconds





Dx/Plan


(1) Complicated UTI (urinary tract infection)


Code(s): N39.0 - URINARY TRACT INFECTION, SITE NOT SPECIFIED   Status: Acute   





(2) Hypokalemia


Code(s): E87.6 - HYPOKALEMIA   Status: Acute   





(3) Nephrolithiasis


Status: Acute   





(4) H/O traumatic brain injury


Code(s): Z87.820 - PERSONAL HISTORY OF TRAUMATIC BRAIN INJURY   Status: Chronic

   





- Plan


Plan: 


Complicated UTI -Nephrolithiasis / Ureterolithiasis


-Tamsulosin on


-Urology: Dr. Carlos performed cystoscopy yesterday with left ureteral stent 

placement. Recommends treating UTI adequately and outpt urology F/U


-UCx: E. coli - transition from zosyn to bactrim today and complete full 14 day 

course





Hypokalemia


- 3.4 today


- will hold off on further replacing as tolerating PO and initiating bactrim rx





Elevated blood pressures


-Multiple elevated BP readings


-Patient does not take HTN medications at home


-will initiate low dose ACEi today





HLD


-Continue home medication regimen





Constipation


-Continue home medication regimen





Code: Full


Diet: HH


Activity: Ad rodger


VTE PPx: Lovenox 40 mg SC





Dispo: Patient is currently stable and admitted to the Medical Floor for 

treatment of Complicated UTI 2/2 Ureterolithiasis. Transition to PO abx today. 

Starting anti-HTN. Likely DC tomorrow.





Addendum - Attending





- Attending Attestation


Date/Time: 01/13/20 1956





I personally evaluated the patient and discussed the management with Dr. Cardenas


I agree with the History, Examination, Assessment and Plan documented above 

with any addition or exceptions noted below. Patient s/p successful left 

ureteral stent placement .Discussed with daughter primary care giver  routine 

bowel regimen with miralax daily and prn lactulose and need to f/u Dr Napier 

Urologist for further evaluation. Patient transitioned to po antibiotic.

## 2020-01-13 NOTE — PRG
DATE OF SERVICE:  01/13/2020



The patient is afebrile with stable vital signs.  She is complaining of pain in her

right knee and femur region.  I am examining her she does not have anything to

suggest a deep vein thrombosis, tender when you try to move her knee and with her

leg outstretched when tried to bend the knee.  I do not see any swelling around.  I

think this may just be somewhat related just positioning for the cystoscopic exam

yesterday.  She is not ambulatory and I talked with her daughter about whether they

want to proceed looking at x-rays of this region or not or getting orthopedics

involved.  She felt like they probably do not unless it continues to bother her at

home.  She grew out in her urine and E coli that is sensitive to Bactrim, which she

has been switched over to and I think she could go home at any time on that.  I am

going to set her up for a ureteroscopy of this left ureteral stone with laser

lithotripsy for 2 weeks from today.  My office will schedule that and contact her. 







Job ID:  [id number]
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
## 2020-01-15 NOTE — PQF
CHRISTIANO REN GRADY    *r

[id number]                                                             ONC-130

[id number]                             

                                   

CLINICAL DOCUMENTATION CLARIFICATION FORM:  POST DISCHARGE



Addendum to original discharge summary date:  __________________________________
____



Late entry note date:  _________________________________________________________
__











DATE:1/15/2020                                         ATTN: Jose Martin Sharma



Please exercise your independent, professional judgment in responding to the 
clarification form. 

Clinical indicators are provided on the bottom of this form for your review



Please check appropriate box(s):

[ x ] Acute Toxic Encephalopathy

[  ] Acute Metabolic Encephalopathy

[  ] Transient Alteration of Awareness 

[  ] Other diagnosis ___________

[  ] Unable to determine



In addition, please specify:

Present on Admission (POA):  [x ] Yes             [  ] No             [  ] 
Unable to determine



For continuity of documentation, please document condition throughout progress 
notes and discharge summary.  Thank You.



CLINICAL INDICATORS - SIGNS / SYMPTOMS / LABS

ED Notes p2 1/10 SIRS scoring: Patient meets criteria

ED Notes p3 1/10 Vital sign: Resp 24, Temp 99.0

Family Medicine H&P p1 1/10 Dr TidwellPresents to ED for a 24H history of 
fever and AMS

Family Medicine H&P p1 1/10 Dr Tidwell Hx of frequent renal stones and UTIs 
that have resulted in multiple multiple episodes of sepsis

Family Medicine H&P p1 1/10 Dr Tidwell notified a fever of 101 F (Oral) and 
mild confusion during the night prior to presentation.





RISK FACTORS

Family Medicine H&P p4 1/10  AMS

Family Medicine H&P p4 1/10  Nephrolithiasis

Family Medicine H&P p4 1/10  Complicated UTI





TREATMENTS:

MAR 1/10  IV Ceftriaxone

MAR 1/10  IV Zosyn

MAR 1/10  Acetaminophen

Urology consult 1/12  Dr Carlos

Operative report  -  Cystoscopy with Stent placement





(This form is maintained as a part of the permanent medical record)

2015 [employer].  All Rights Reserved

[email or web address]    [not provided]

                                                              



 

MTDD

## 2020-01-15 NOTE — PQF
CHRISTIANO REN GRADY 

[id number]                                                             ONC-130

[id number]                             

                                   

CLINICAL DOCUMENTATION CLARIFICATION FORM:  POST DISCHARGE



Addendum to original discharge summary date:  __________________________________
____



Late entry note date:  _________________________________________________________
__











DATE:1/15/2020                                         ATTN: Jose Martin Sharma



Please exercise your independent, professional judgment in responding to the 
clarification form. 

Clinical indicators are provided on the bottom of this form for your review



Please check appropriate box(es):

[ x ] Sepsis due to UTI

[  ] Severe sepsis with acute organ dysfunction of: ____________________________
_______

        (Examples: respiratory failure, encephalopathy, acute kidney failure, 
other)

[  ] Septic Shock

[  ] Localized infection without sepsis

[  ] Other diagnosis ___________

[  ] Unable to determine



In addition, please specify:

Present on Admission (POA):  [ x ] Yes             [  ] No             [  ] 
Unable to determine



For continuity of documentation, please document condition throughout progress 
notes and discharge summary.  Thank You.



CLINICAL INDICATORS - SIGNS / SYMPTOMS / LABS

ED Notes p2 1/10 SIRS scoring: Patient meets criteria

ED Notes p3 1/10 Vital sign: Resp 24, Temp 99.0

Family Medicine H&P p1 1/10 Dr TidwellPresents to ED for a 24H history of 
fever and AMS

Family Medicine H&P p1 1/10 Dr Tidwell Hx of frequent renal stones and UTIs 
that have resulted in multiple multiple episodes of sepsis

Family Medicine H&P p1 1/10 Dr Tidwell notified a fever of 101 F (Oral) and 
mild confusion during the night prior to presentation.





RISK FACTORS

Family Medicine H&P p4 1/10  AMS

Family Medicine H&P p4 1/10  Nephrolithiasis

Family Medicine H&P p4 1/10  Complicated UTI





TREATMENTS:

MAR 1/10  IV Ceftriaxone

MAR 1/10  IV Zosyn

MAR 1/10  Acetaminophen

Urology consult 1/12  Dr Carlos

Operative report  -  Cystoscopy with Stent placement











(This form is maintained as a part of the permanent medical record)

2015 [employer]  All Rights Reserved

[email or web address]    [not provided]

                                                              



 

MTDD

## 2020-01-18 NOTE — EKG
Test Reason : 

Blood Pressure : ***/*** mmHG

Vent. Rate : 076 BPM     Atrial Rate : 076 BPM

   P-R Int : 192 ms          QRS Dur : 092 ms

    QT Int : 414 ms       P-R-T Axes : 054 -45 043 degrees

   QTc Int : 465 ms

 

Normal sinus rhythm

Incomplete right bundle branch block

Left anterior fascicular block

Minimal voltage criteria for LVH, may be normal variant

Anterolateral infarct , age undetermined

Abnormal ECG

 

Confirmed by GIAN BAUTISTA ([id number]),  DEBBIE DA SILVA ([id number]) on 1/18/2020 11:46:07 AM

 

Referred By:             Confirmed By:GIAN BAUTISTA

## 2020-01-27 ENCOUNTER — HOSPITAL ENCOUNTER (OUTPATIENT)
Dept: HOSPITAL 92 - SDC | Age: 75
Discharge: HOME | End: 2020-01-27
Attending: UROLOGY
Payer: MEDICARE

## 2020-01-27 VITALS — BODY MASS INDEX: 32.3 KG/M2

## 2020-01-27 DIAGNOSIS — Z79.899: ICD-10-CM

## 2020-01-27 DIAGNOSIS — N20.1: Primary | ICD-10-CM

## 2020-01-27 PROCEDURE — C1758 CATHETER, URETERAL: HCPCS

## 2020-01-27 PROCEDURE — 74420 UROGRAPHY RTRGR +-KUB: CPT

## 2020-01-27 PROCEDURE — 0T778DZ DILATION OF LEFT URETER WITH INTRALUMINAL DEVICE, VIA NATURAL OR ARTIFICIAL OPENING ENDOSCOPIC: ICD-10-PCS | Performed by: UROLOGY

## 2020-01-27 PROCEDURE — 0TF78ZZ FRAGMENTATION IN LEFT URETER, VIA NATURAL OR ARTIFICIAL OPENING ENDOSCOPIC: ICD-10-PCS | Performed by: UROLOGY

## 2020-01-27 PROCEDURE — 52356 CYSTO/URETERO W/LITHOTRIPSY: CPT

## 2020-01-27 NOTE — RAD
RETROGRADE PYELOGRAM:

A total of 12 images.

 

INDICATION: 

Left ureteral stone.  Intraoperative imaging.

 

FINDINGS/IMPRESSION: 

Fluorosocpic images presented show catheter placed into the left ureter with opacification of the lef
t collecting structures.  Final image demonstrates placement of a left double-pigtail ureteral stent.


 

POS: FOSTER

## 2020-07-09 ENCOUNTER — HOSPITAL ENCOUNTER (OUTPATIENT)
Dept: HOSPITAL 92 - ERS | Age: 75
Setting detail: OBSERVATION
LOS: 1 days | Discharge: HOME | End: 2020-07-10
Attending: FAMILY MEDICINE | Admitting: FAMILY MEDICINE
Payer: MEDICARE

## 2020-07-09 VITALS — BODY MASS INDEX: 32.1 KG/M2

## 2020-07-09 DIAGNOSIS — E78.5: ICD-10-CM

## 2020-07-09 DIAGNOSIS — K21.9: ICD-10-CM

## 2020-07-09 DIAGNOSIS — I25.10: ICD-10-CM

## 2020-07-09 DIAGNOSIS — Z79.899: ICD-10-CM

## 2020-07-09 DIAGNOSIS — F32.9: ICD-10-CM

## 2020-07-09 DIAGNOSIS — N13.6: Primary | ICD-10-CM

## 2020-07-09 DIAGNOSIS — Z87.820: ICD-10-CM

## 2020-07-09 DIAGNOSIS — Z20.828: ICD-10-CM

## 2020-07-09 DIAGNOSIS — K59.09: ICD-10-CM

## 2020-07-09 DIAGNOSIS — I25.2: ICD-10-CM

## 2020-07-09 DIAGNOSIS — I10: ICD-10-CM

## 2020-07-09 LAB
ALBUMIN SERPL BCG-MCNC: 3.4 G/DL (ref 3.4–4.8)
ALP SERPL-CCNC: 82 U/L (ref 40–110)
ALT SERPL W P-5'-P-CCNC: 13 U/L (ref 8–55)
ANION GAP SERPL CALC-SCNC: 13 MMOL/L (ref 10–20)
AST SERPL-CCNC: 12 U/L (ref 5–34)
BACTERIA UR QL AUTO: (no result) HPF
BASOPHILS # BLD AUTO: 0 THOU/UL (ref 0–0.2)
BASOPHILS NFR BLD AUTO: 0.7 % (ref 0–1)
BILIRUB SERPL-MCNC: 0.3 MG/DL (ref 0.2–1.2)
BUN SERPL-MCNC: 14 MG/DL (ref 9.8–20.1)
CALCIUM SERPL-MCNC: 8.6 MG/DL (ref 7.8–10.44)
CHLORIDE SERPL-SCNC: 104 MMOL/L (ref 98–107)
CO2 SERPL-SCNC: 25 MMOL/L (ref 23–31)
CREAT CL PREDICTED SERPL C-G-VRATE: 0 ML/MIN (ref 70–130)
EOSINOPHIL # BLD AUTO: 0.1 THOU/UL (ref 0–0.7)
EOSINOPHIL NFR BLD AUTO: 1 % (ref 0–10)
GLOBULIN SER CALC-MCNC: 3.5 G/DL (ref 2.4–3.5)
GLUCOSE SERPL-MCNC: 87 MG/DL (ref 83–110)
HGB BLD-MCNC: 12.3 G/DL (ref 12–16)
LEUKOCYTE ESTERASE UR QL STRIP.AUTO: 500 LEU/UL
LYMPHOCYTES # BLD: 1.5 THOU/UL (ref 1.2–3.4)
LYMPHOCYTES NFR BLD AUTO: 21.9 % (ref 21–51)
MCH RBC QN AUTO: 30.7 PG (ref 27–31)
MCV RBC AUTO: 95.9 FL (ref 78–98)
MONOCYTES # BLD AUTO: 0.8 THOU/UL (ref 0.11–0.59)
MONOCYTES NFR BLD AUTO: 11.3 % (ref 0–10)
NEUTROPHILS # BLD AUTO: 4.4 THOU/UL (ref 1.4–6.5)
NEUTROPHILS NFR BLD AUTO: 65 % (ref 42–75)
PLATELET # BLD AUTO: 263 THOU/UL (ref 130–400)
POTASSIUM SERPL-SCNC: 4.1 MMOL/L (ref 3.5–5.1)
PROT UR STRIP.AUTO-MCNC: 20 MG/DL
RBC # BLD AUTO: 4.02 MILL/UL (ref 4.2–5.4)
RBC UR QL AUTO: (no result) HPF (ref 0–3)
SODIUM SERPL-SCNC: 138 MMOL/L (ref 136–145)
SP GR UR STRIP: 1.01 (ref 1–1.04)
WBC # BLD AUTO: 6.8 THOU/UL (ref 4.8–10.8)

## 2020-07-09 PROCEDURE — U0002 COVID-19 LAB TEST NON-CDC: HCPCS

## 2020-07-09 PROCEDURE — 87635 SARS-COV-2 COVID-19 AMP PRB: CPT

## 2020-07-09 PROCEDURE — 87186 SC STD MICRODIL/AGAR DIL: CPT

## 2020-07-09 PROCEDURE — 87077 CULTURE AEROBIC IDENTIFY: CPT

## 2020-07-09 PROCEDURE — 81003 URINALYSIS AUTO W/O SCOPE: CPT

## 2020-07-09 PROCEDURE — 96365 THER/PROPH/DIAG IV INF INIT: CPT

## 2020-07-09 PROCEDURE — 85025 COMPLETE CBC W/AUTO DIFF WBC: CPT

## 2020-07-09 PROCEDURE — 36415 COLL VENOUS BLD VENIPUNCTURE: CPT

## 2020-07-09 PROCEDURE — 96367 TX/PROPH/DG ADDL SEQ IV INF: CPT

## 2020-07-09 PROCEDURE — 99285 EMERGENCY DEPT VISIT HI MDM: CPT

## 2020-07-09 PROCEDURE — 87086 URINE CULTURE/COLONY COUNT: CPT

## 2020-07-09 PROCEDURE — G0378 HOSPITAL OBSERVATION PER HR: HCPCS

## 2020-07-09 PROCEDURE — 84145 PROCALCITONIN (PCT): CPT

## 2020-07-09 PROCEDURE — 96361 HYDRATE IV INFUSION ADD-ON: CPT

## 2020-07-09 PROCEDURE — U0003 INFECTIOUS AGENT DETECTION BY NUCLEIC ACID (DNA OR RNA); SEVERE ACUTE RESPIRATORY SYNDROME CORONAVIRUS 2 (SARS-COV-2) (CORONAVIRUS DISEASE [COVID-19]), AMPLIFIED PROBE TECHNIQUE, MAKING USE OF HIGH THROUGHPUT TECHNOLOGIES AS DESCRIBED BY CMS-2020-01-R: HCPCS

## 2020-07-09 PROCEDURE — 81015 MICROSCOPIC EXAM OF URINE: CPT

## 2020-07-09 PROCEDURE — 80048 BASIC METABOLIC PNL TOTAL CA: CPT

## 2020-07-09 PROCEDURE — 74176 CT ABD & PELVIS W/O CONTRAST: CPT

## 2020-07-09 PROCEDURE — 80053 COMPREHEN METABOLIC PANEL: CPT

## 2020-07-09 PROCEDURE — 51701 INSERT BLADDER CATHETER: CPT

## 2020-07-09 PROCEDURE — 52332 CYSTOSCOPY AND TREATMENT: CPT

## 2020-07-09 PROCEDURE — 74420 UROGRAPHY RTRGR +-KUB: CPT

## 2020-07-09 NOTE — CT
CT abdomen and pelvis noncontrast



HISTORY: Right flank pain.



COMPARISON: 1/10/2020.



FINDINGS: There is subtle wall thickening and adjacent fat stranding involving the nondilated right r
enal pelvis and proximal ureter. Multiple nonobstructing calculi of right renal calyces are again

demonstrated, measuring up to 1.1 cm greatest diameter.



The left urinary collecting system and ureter remain dilated to the level of a 0.7 cm x 0.4 cm distal
 left ureteral calculus that has migrated inferiorly since the previous exam. Additional

calcifications are present within the left renal collecting system measuring up to 0.4 cm.



Lack of contrast limits evaluation for other abnormalities. Old right lower rib fractures are again d
emonstrated. Parenchymal scarring/atelectasis at the dependent portion of each lower lobe.



Hyperdense stones are again demonstrated within the gallbladder lumen.



There are prominent degenerative changes of the lumbar spine.



The rectum remains markedly distended with fecal material, up to 10.2 cm. There is relative thickenin
g of the rectal wall given the degree of distention. The appearance is similar to the 1/10/2020

study.



  



IMPRESSION :

While no right urinary tract obstruction is evident, there is wall thickening and adjacent fat strand
ing at the right renal pelvis and proximal ureter that could reflect pyelonephritis.



Chronic partial obstruction of the distal left ureter, where a 7 mm calculus has migrated distally si
nce the previous exam.



Additional nonobstructing bilateral renal calculi.



Prominent fecal impaction with thickening of the rectal wall. Clinical correlation regarding other si
gns and symptoms of stercoral proctitis is required.



Cholelithiasis.







Reported By: MARCUS Koehler 

Electronically Signed:  7/9/2020 12:11 PM
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
## 2020-07-10 VITALS — DIASTOLIC BLOOD PRESSURE: 68 MMHG | SYSTOLIC BLOOD PRESSURE: 118 MMHG | TEMPERATURE: 97.8 F

## 2020-07-10 LAB
ANION GAP SERPL CALC-SCNC: 11 MMOL/L (ref 10–20)
BASOPHILS # BLD AUTO: 0 THOU/UL (ref 0–0.2)
BASOPHILS NFR BLD AUTO: 0.5 % (ref 0–1)
BUN SERPL-MCNC: 13 MG/DL (ref 9.8–20.1)
CALCIUM SERPL-MCNC: 8.6 MG/DL (ref 7.8–10.44)
CHLORIDE SERPL-SCNC: 107 MMOL/L (ref 98–107)
CO2 SERPL-SCNC: 26 MMOL/L (ref 23–31)
CREAT CL PREDICTED SERPL C-G-VRATE: 110 ML/MIN (ref 70–130)
EOSINOPHIL # BLD AUTO: 0.1 THOU/UL (ref 0–0.7)
EOSINOPHIL NFR BLD AUTO: 1.9 % (ref 0–10)
GLUCOSE SERPL-MCNC: 106 MG/DL (ref 83–110)
HGB BLD-MCNC: 12.1 G/DL (ref 12–16)
LYMPHOCYTES # BLD: 1.2 THOU/UL (ref 1.2–3.4)
LYMPHOCYTES NFR BLD AUTO: 20.1 % (ref 21–51)
MCH RBC QN AUTO: 30.9 PG (ref 27–31)
MCV RBC AUTO: 95.8 FL (ref 78–98)
MONOCYTES # BLD AUTO: 0.7 THOU/UL (ref 0.11–0.59)
MONOCYTES NFR BLD AUTO: 10.8 % (ref 0–10)
NEUTROPHILS # BLD AUTO: 4.1 THOU/UL (ref 1.4–6.5)
NEUTROPHILS NFR BLD AUTO: 66.8 % (ref 42–75)
PLATELET # BLD AUTO: 260 THOU/UL (ref 130–400)
POTASSIUM SERPL-SCNC: 4.3 MMOL/L (ref 3.5–5.1)
RBC # BLD AUTO: 3.91 MILL/UL (ref 4.2–5.4)
SODIUM SERPL-SCNC: 140 MMOL/L (ref 136–145)
WBC # BLD AUTO: 6.1 THOU/UL (ref 4.8–10.8)

## 2020-07-10 PROCEDURE — 0T778DZ DILATION OF LEFT URETER WITH INTRALUMINAL DEVICE, VIA NATURAL OR ARTIFICIAL OPENING ENDOSCOPIC: ICD-10-PCS | Performed by: UROLOGY

## 2020-07-10 NOTE — PRG
DATE OF SERVICE:  07/10/2020



The patient has been afebrile.  Her vital signs have been stable.  She is on Zosyn

now for antibiotic coverage.  She has no cultures back yet.  Her white count is 6.1,

hemoglobin is 12.1 this morning.  Her creatinine is 0.61.  COVID status, not

detected.  She has been n.p.o. after midnight.  We took her to the OR and placed a

stent, it went up easily.  We are going to give her a couple of weeks to see if she

can pass the stone.  If not, we will go up and get it with the ureteroscope and my

office will arrange for that.  From my standpoint, she could probably go home at any

time.  If she stays overnight, I would like her to go home with appropriate

antibiotics based on the results of her culture.  I will sign off her at this time.

I have talked with her daughter.  I will arrange for followup.  If she needs further

urologic evaluation this weekend, just consult the urologist as on-call will be

covering for me. 







Job ID:  [id number]

## 2020-07-10 NOTE — RAD
EXAM:  XR IVP Retrograde



DATE:  7/10/2020 12:00 AM



INDICATION:  75-year-old female with left ureteral stent



COMPARISON:  CT of the abdomen and pelvis dated July 9, 2020



FINDING:  The initial image demonstrates an IVC filter is seen right of midline at L3. There is promi
nent amount retained stool within the rectum. Subsequent image demonstrates retrograde

opacification of both renal collecting systems via retrograde opacification the bladder. There is a f
illing defect involving the distal left ureter corresponding to the 7 mm distal left ureteral stone

seen on the recent CT the abdomen and pelvis. Subsequent images demonstrate placement of a wire withi
n the left renal collecting system projecting to a superior calyx of the left kidney. Subsequent

images demonstrate placement of a left ureteral double-J stent. The left ureteral stone is not defini
tely seen on the final provided image and may have been removed. No appreciable residual contrast

is seen within the renal collecting systems.





IMPRESSION:

1. Distal left ureteral calculus. Suspected removal on the final submitted image.

2. Interval placement of a left ureteral stent projecting in expected position.

3. Large amount retained stool within the rectum.

4. IVC filter



Reported By: Robby John 

Electronically Signed:  7/10/2020 12:52 PM

## 2020-07-10 NOTE — PDOC.FM
- Subjective


Subjective: 


 Pt was resting comfortably. She is stable and has no complaints. She is NPO in 

preparation for urology procedure today.








- Objective


Vital Signs & Weight: 


 Vital Signs (12 hours)











  Temp Pulse Resp BP Pulse Ox


 


 07/09/20 19:46  97.9 F  64  17  164/84 H  94 L








 Weight











Weight                         89.811 kg














Result Diagrams: 


 07/10/20 05:23





 07/10/20 05:23





Phys Exam





- Physical Examination


Constitutional: NAD


HEENT: moist MMs


poor dentition


Neck: no JVD, supple


Respiratory: no wheezing, clear to auscultation bilateral


Cardiovascular: RRR, no significant murmur


Gastrointestinal: soft, positive bowel sounds


mild tenderness to palpation over entire lower abdomen


Musculoskeletal: no edema


right sided weakness from TBI


speech and mentation deficit from TBI


Skin: no rash





Dx/Plan





- Plan


Plan: 


 Partially obstructing nephrolithiasis


-CT showed bilateral nephrolithiasis, partially obstructing kidney stone on left


-urology, Dr eVgas, consulted- plans to go to surgery today to place ureteral 

stent


-NPO for surgery


-will follow urology recs





Complicated UTI 


-history of multiple UTI's and bilateral kidney stones


-CT showed possible pyelonephritis but currently vital signs are stable, no 

sepsis


-chart review shows past urine cultures grew E.coli and pseudomonas


-was given 2g rocephin in ED


-currently on Zosyn and LR at 120ml/hr


-UCx showed E coli and proteus- previous UCx that grew same species were 

sensitive to Zosyn





Chronic constipation


-CT shows dilated colon with fecal impaction


-was given soap suds enemax2, miralax and manual fecal impaction





HTN


-BP elevated this morning at 164/84


-currently not taking lisinopril as per PCP recommendation


-will monitor vitals and consider adding anti-hypertensive





HLD


-continue home meds





GERD


-continue home meds





CODE: FULL


IVF: 


Prophylaxis: lovenox, SCD


PCP: Dr. Bradley





Disposition: admit to medical, obs, will treat UTI and fecal impaction, 

appreciate urology recommendations, LOS<48hrs.








Addendum - Attending





- Attending Attestation


Date/Time: 07/10/20 2586





I personally evaluated the patient and discussed the management with Dr. Deshpande. 


I agree with the History, Examination, Assessment and Plan documented above 

with any addition or exceptions noted below.





Patient overall stable. No evidence of sepsis or systemic infection. On abx, 

cultures pending. Going for Uro procedure today. Needs improved bowel regimen.

## 2020-07-10 NOTE — OP
DATE OF PROCEDURE:  07/10/2020



PREOPERATIVE DIAGNOSES:  Left ureteral stone, urinary tract infection.



POSTOPERATIVE DIAGNOSES:  Left ureteral stone, urinary tract infection.



PROCEDURES PERFORMED:  Cysto, left retrograde, left stent.



ANESTHETIC:  General.



ESTIMATED BLOOD LOSS:  Minimal.



FINDINGS:  She had a left lower third ureteral stone and had a 6 x 24 double-J stent

placed.  She does have some reflux, upper right ureter. 



DESCRIPTION OF PROCEDURE:  After obtaining written and verbal consent from the

patient and her family, she was taken to the operating suite.  She was placed in a

supine position on the treatment table.  PlexiPulses were placed on her lower

extremities and turned on.  She was given a general anesthetic and oral obturator

intubation, placed in the dorsal lithotomy position, sterilely prepped and draped.

Cystoscopy was performed with a 22-Armenian sheath.  This was well lubricated, passed

under direct vision through the female urethra into the bladder with aid of a

30-degree lens, video camera, and monitor.  The bladder was filled and emptied

number of times.  The left ureteral orifice was identified.  A 5-Armenian Pollack

catheter was placed into at about a centimeter, and contrast was slowly injected in

a retrograde manner.  The wire was then passed up this side and over that wire, a 6

x 24 Polaris double-J stent was placed and pushed up with aid of a pusher, so its

proximal end coiled in the renal pelvis and its distal end coiled in the bladder

when the wire was removed.  The bladder was drained.  The instruments were removed.

She was taken out of dorsal lithotomy position, awakened, extubated, and taken by

stretcher to recovery room. 







Job ID:  [id number]
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
## 2020-07-22 ENCOUNTER — HOSPITAL ENCOUNTER (OUTPATIENT)
Dept: HOSPITAL 92 - LABBT | Age: 75
Discharge: HOME | End: 2020-07-22
Attending: UROLOGY
Payer: MEDICARE

## 2020-07-22 DIAGNOSIS — Z11.59: ICD-10-CM

## 2020-07-22 DIAGNOSIS — N20.1: ICD-10-CM

## 2020-07-22 DIAGNOSIS — Z01.812: Primary | ICD-10-CM

## 2020-07-22 PROCEDURE — 87635 SARS-COV-2 COVID-19 AMP PRB: CPT

## 2020-07-22 PROCEDURE — U0003 INFECTIOUS AGENT DETECTION BY NUCLEIC ACID (DNA OR RNA); SEVERE ACUTE RESPIRATORY SYNDROME CORONAVIRUS 2 (SARS-COV-2) (CORONAVIRUS DISEASE [COVID-19]), AMPLIFIED PROBE TECHNIQUE, MAKING USE OF HIGH THROUGHPUT TECHNOLOGIES AS DESCRIBED BY CMS-2020-01-R: HCPCS

## 2020-07-27 ENCOUNTER — HOSPITAL ENCOUNTER (OUTPATIENT)
Dept: HOSPITAL 92 - SDC | Age: 75
Discharge: HOME | End: 2020-07-27
Attending: UROLOGY
Payer: MEDICARE

## 2020-07-27 VITALS — BODY MASS INDEX: 0.3 KG/M2

## 2020-07-27 DIAGNOSIS — N13.2: Primary | ICD-10-CM

## 2020-07-27 DIAGNOSIS — K21.9: ICD-10-CM

## 2020-07-27 PROCEDURE — 0T778DZ DILATION OF LEFT URETER WITH INTRALUMINAL DEVICE, VIA NATURAL OR ARTIFICIAL OPENING ENDOSCOPIC: ICD-10-PCS | Performed by: UROLOGY

## 2020-07-27 PROCEDURE — 0TC78ZZ EXTIRPATION OF MATTER FROM LEFT URETER, VIA NATURAL OR ARTIFICIAL OPENING ENDOSCOPIC: ICD-10-PCS | Performed by: UROLOGY

## 2020-07-27 PROCEDURE — 74420 UROGRAPHY RTRGR +-KUB: CPT

## 2020-07-27 NOTE — RAD
EXAM: Retrograde IVP



HISTORY: Ureteral stent



COMPARISON: 7/10/2020



FINDINGS/IMPRESSION: Limited intraoperative fluoroscopic views of the retrograde IVP were submitted f
or interpretation. There is a left-sided ureteral stent which appears in good position. No obvious

urinary collecting system calcifications are seen.  There is at least mild left-sided hydronephrosis.




Reported By: Arcenio Alfaro 

Electronically Signed:  7/27/2020 2:32 PM

## 2020-07-27 NOTE — OP
DATE OF PROCEDURE:  07/27/2020



PREOPERATIVE DIAGNOSIS:  Left ureteral stone with stent.



POSTOPERATIVE DIAGNOSIS:  Left ureteral stone with stent.



PROCEDURES PERFORMED:  

1. Cystoscopy.

2. Removal of left stent.

3. Left rigid ureteroscopy.

4. Laser lithotripsy.

5. Stone retrieval.

6. Stent replacement.



ANESTHETIC:  General.



ESTIMATED BLOOD LOSS:  Minimal.



FINDINGS:  There is a left mid to lower 3rd ureteral stone, probably 6 mm, was

lasered into small pieces.  A couple of more basketed and removed.  Did not send

these fragments.  It was a soft stone, most of which was broken up and washed down

the ureter.  At the end of procedure, a retrograde study revealed no extravasation

along the course of the ureter obstruction.  She had a 4.8 x 20-Cypriot double-J

stent placed with a string attached to it, exiting a couple inches outside the

urethral meatus. 



DESCRIPTION OF PROCEDURE:  After obtaining written and verbal consent from the

patient after receiving some IV Rocephin, she was taken to the operating suite.  She

was placed in a supine position on the treatment table.  PlexiPulses were placed on

her lower extremities and turned on.  She was given a general anesthetic and oral

obturator intubation.  She was placed in the dorsal lithotomy position, sterilely

prepped and draped.  Cystoscopy was performed with a 22-Cypriot sheath, which was

placed through the female urethra into the urinary bladder.  The bladder was filled

and emptied a number of times and then the distal end of the double-J stent was

grasped, brought out through the urethral meatus.  It already calcified and it could

not feed a guidewire up it.  The stent was removed intact.  A guidewire was then fed

up the left ureter up into the region of the renal pelvis.  A small caliber

graduated rigid ureteroscope was brought in with aid of video camera and monitor,

placed through the female urethra into the bladder and up the left ureter at about

the junction of the mid to the lower third of the left ureter.  The stone was

encountered.  A small caliber laser fiber was brought in and holmium laser was used

to break the stone up into multiple pieces.  A couple of swipes were made with a

Nitinol basket.  I did not send any specimens off as she has had numerous stone sent

off in the past.  At this point, the guidewire was back-loaded through the

cystoscope.  A Pollack catheter was advanced up the left ureter over the course of

the guidewire.  The guidewire was removed.  Contrast was injected to fill out the

collecting system.  There was no obstruction noted.  No extravasation noted.  The

wire was replaced and a stent was placed over the guidewire, pushed up in place with

aid of a pusher, so its proximal end coiled in the renal pelvis and its distal end

coiled in the bladder, when the wire was removed.  The bladder was drained.  The

instruments were removed.  The string was cut a couple inches exiting urethral

meatus.  She at this point was taken out of the dorsal lithotomy position, awakened,

extubated, and taken by stretcher to recovery room. 







Job ID:  [id number]

## 2020-11-23 ENCOUNTER — HOSPITAL ENCOUNTER (INPATIENT)
Dept: HOSPITAL 92 - ERS | Age: 75
LOS: 7 days | Discharge: NURSING, REHAB OR LONG-TERM CARE | DRG: 853 | End: 2020-11-30
Attending: FAMILY MEDICINE | Admitting: FAMILY MEDICINE
Payer: MEDICARE

## 2020-11-23 VITALS — BODY MASS INDEX: 33.2 KG/M2

## 2020-11-23 DIAGNOSIS — Z87.820: ICD-10-CM

## 2020-11-23 DIAGNOSIS — Z79.899: ICD-10-CM

## 2020-11-23 DIAGNOSIS — N13.6: ICD-10-CM

## 2020-11-23 DIAGNOSIS — F03.90: ICD-10-CM

## 2020-11-23 DIAGNOSIS — E87.2: ICD-10-CM

## 2020-11-23 DIAGNOSIS — Z20.828: ICD-10-CM

## 2020-11-23 DIAGNOSIS — A41.51: Primary | ICD-10-CM

## 2020-11-23 DIAGNOSIS — I10: ICD-10-CM

## 2020-11-23 DIAGNOSIS — K56.41: ICD-10-CM

## 2020-11-23 DIAGNOSIS — R65.21: ICD-10-CM

## 2020-11-23 DIAGNOSIS — E87.6: ICD-10-CM

## 2020-11-23 DIAGNOSIS — E87.5: ICD-10-CM

## 2020-11-23 DIAGNOSIS — B96.4: ICD-10-CM

## 2020-11-23 DIAGNOSIS — I25.2: ICD-10-CM

## 2020-11-23 DIAGNOSIS — D69.6: ICD-10-CM

## 2020-11-23 DIAGNOSIS — Z90.710: ICD-10-CM

## 2020-11-23 DIAGNOSIS — J44.9: ICD-10-CM

## 2020-11-23 DIAGNOSIS — E87.0: ICD-10-CM

## 2020-11-23 DIAGNOSIS — Z74.01: ICD-10-CM

## 2020-11-23 DIAGNOSIS — I47.1: ICD-10-CM

## 2020-11-23 DIAGNOSIS — I69.851: ICD-10-CM

## 2020-11-23 DIAGNOSIS — K21.9: ICD-10-CM

## 2020-11-23 LAB
ALBUMIN SERPL BCG-MCNC: 3.7 G/DL (ref 3.4–4.8)
ALP SERPL-CCNC: 117 U/L (ref 40–110)
ALT SERPL W P-5'-P-CCNC: 7 U/L (ref 8–55)
ANION GAP SERPL CALC-SCNC: 17 MMOL/L (ref 10–20)
AST SERPL-CCNC: 17 U/L (ref 5–34)
BACTERIA UR QL AUTO: (no result) HPF
BASOPHILS # BLD AUTO: 0 THOU/UL (ref 0–0.2)
BASOPHILS NFR BLD AUTO: 0.1 % (ref 0–1)
BILIRUB SERPL-MCNC: 0.5 MG/DL (ref 0.2–1.2)
BUN SERPL-MCNC: 13 MG/DL (ref 9.8–20.1)
CALCIUM SERPL-MCNC: 9.2 MG/DL (ref 7.8–10.44)
CHLORIDE SERPL-SCNC: 101 MMOL/L (ref 98–107)
CO2 SERPL-SCNC: 23 MMOL/L (ref 23–31)
CREAT CL PREDICTED SERPL C-G-VRATE: 0 ML/MIN (ref 70–130)
EOSINOPHIL # BLD AUTO: 0 THOU/UL (ref 0–0.7)
EOSINOPHIL NFR BLD AUTO: 0.2 % (ref 0–10)
GLOBULIN SER CALC-MCNC: 4.7 G/DL (ref 2.4–3.5)
GLUCOSE SERPL-MCNC: 142 MG/DL (ref 83–110)
HGB BLD-MCNC: 14.5 G/DL (ref 12–16)
LEUKOCYTE ESTERASE UR QL STRIP.AUTO: 500 LEU/UL
LIPASE SERPL-CCNC: 21 U/L (ref 8–78)
LYMPHOCYTES # BLD: 0.7 THOU/UL (ref 1.2–3.4)
LYMPHOCYTES NFR BLD AUTO: 4.6 % (ref 21–51)
MCH RBC QN AUTO: 30.1 PG (ref 27–31)
MCV RBC AUTO: 92.1 FL (ref 78–98)
MONOCYTES # BLD AUTO: 0.4 THOU/UL (ref 0.11–0.59)
MONOCYTES NFR BLD AUTO: 2.4 % (ref 0–10)
NEUTROPHILS # BLD AUTO: 13.6 THOU/UL (ref 1.4–6.5)
NEUTROPHILS NFR BLD AUTO: 92.7 % (ref 42–75)
PLATELET # BLD AUTO: 385 THOU/UL (ref 130–400)
POTASSIUM SERPL-SCNC: 3.7 MMOL/L (ref 3.5–5.1)
PROT UR STRIP.AUTO-MCNC: 200 MG/DL
RBC # BLD AUTO: 4.82 MILL/UL (ref 4.2–5.4)
SODIUM SERPL-SCNC: 137 MMOL/L (ref 136–145)
SP GR UR STRIP: 1.01 (ref 1–1.04)
WBC # BLD AUTO: 14.7 THOU/UL (ref 4.8–10.8)

## 2020-11-23 PROCEDURE — 36415 COLL VENOUS BLD VENIPUNCTURE: CPT

## 2020-11-23 PROCEDURE — 80053 COMPREHEN METABOLIC PANEL: CPT

## 2020-11-23 PROCEDURE — 84100 ASSAY OF PHOSPHORUS: CPT

## 2020-11-23 PROCEDURE — 81015 MICROSCOPIC EXAM OF URINE: CPT

## 2020-11-23 PROCEDURE — 74177 CT ABD & PELVIS W/CONTRAST: CPT

## 2020-11-23 PROCEDURE — 83605 ASSAY OF LACTIC ACID: CPT

## 2020-11-23 PROCEDURE — 36556 INSERT NON-TUNNEL CV CATH: CPT

## 2020-11-23 PROCEDURE — 85025 COMPLETE CBC W/AUTO DIFF WBC: CPT

## 2020-11-23 PROCEDURE — 99292 CRITICAL CARE ADDL 30 MIN: CPT

## 2020-11-23 PROCEDURE — 80202 ASSAY OF VANCOMYCIN: CPT

## 2020-11-23 PROCEDURE — 84484 ASSAY OF TROPONIN QUANT: CPT

## 2020-11-23 PROCEDURE — 81003 URINALYSIS AUTO W/O SCOPE: CPT

## 2020-11-23 PROCEDURE — 87086 URINE CULTURE/COLONY COUNT: CPT

## 2020-11-23 PROCEDURE — 96365 THER/PROPH/DIAG IV INF INIT: CPT

## 2020-11-23 PROCEDURE — 96376 TX/PRO/DX INJ SAME DRUG ADON: CPT

## 2020-11-23 PROCEDURE — 87186 SC STD MICRODIL/AGAR DIL: CPT

## 2020-11-23 PROCEDURE — 96366 THER/PROPH/DIAG IV INF ADDON: CPT

## 2020-11-23 PROCEDURE — 93005 ELECTROCARDIOGRAM TRACING: CPT

## 2020-11-23 PROCEDURE — 96375 TX/PRO/DX INJ NEW DRUG ADDON: CPT

## 2020-11-23 PROCEDURE — 96368 THER/DIAG CONCURRENT INF: CPT

## 2020-11-23 PROCEDURE — 83735 ASSAY OF MAGNESIUM: CPT

## 2020-11-23 PROCEDURE — 87149 DNA/RNA DIRECT PROBE: CPT

## 2020-11-23 PROCEDURE — 87040 BLOOD CULTURE FOR BACTERIA: CPT

## 2020-11-23 PROCEDURE — 96367 TX/PROPH/DG ADDL SEQ IV INF: CPT

## 2020-11-23 PROCEDURE — 74420 UROGRAPHY RTRGR +-KUB: CPT

## 2020-11-23 PROCEDURE — 87449 NOS EACH ORGANISM AG IA: CPT

## 2020-11-23 PROCEDURE — 93306 TTE W/DOPPLER COMPLETE: CPT

## 2020-11-23 PROCEDURE — U0002 COVID-19 LAB TEST NON-CDC: HCPCS

## 2020-11-23 PROCEDURE — 51702 INSERT TEMP BLADDER CATH: CPT

## 2020-11-23 PROCEDURE — 87077 CULTURE AEROBIC IDENTIFY: CPT

## 2020-11-23 PROCEDURE — 94760 N-INVAS EAR/PLS OXIMETRY 1: CPT

## 2020-11-23 PROCEDURE — 93010 ELECTROCARDIOGRAM REPORT: CPT

## 2020-11-23 PROCEDURE — 87324 CLOSTRIDIUM AG IA: CPT

## 2020-11-23 PROCEDURE — 83690 ASSAY OF LIPASE: CPT

## 2020-11-24 LAB
ALBUMIN SERPL BCG-MCNC: 2.6 G/DL (ref 3.4–4.8)
ALP SERPL-CCNC: 90 U/L (ref 40–110)
ALT SERPL W P-5'-P-CCNC: 9 U/L (ref 8–55)
ANION GAP SERPL CALC-SCNC: 16 MMOL/L (ref 10–20)
ANION GAP SERPL CALC-SCNC: 17 MMOL/L (ref 10–20)
AST SERPL-CCNC: 22 U/L (ref 5–34)
BILIRUB SERPL-MCNC: 0.5 MG/DL (ref 0.2–1.2)
BUN SERPL-MCNC: 13 MG/DL (ref 9.8–20.1)
BUN SERPL-MCNC: 16 MG/DL (ref 9.8–20.1)
CALCIUM SERPL-MCNC: 7.4 MG/DL (ref 7.8–10.44)
CALCIUM SERPL-MCNC: 7.9 MG/DL (ref 7.8–10.44)
CHLORIDE SERPL-SCNC: 109 MMOL/L (ref 98–107)
CHLORIDE SERPL-SCNC: 113 MMOL/L (ref 98–107)
CO2 SERPL-SCNC: 12 MMOL/L (ref 23–31)
CO2 SERPL-SCNC: 15 MMOL/L (ref 23–31)
CREAT CL PREDICTED SERPL C-G-VRATE: 63 ML/MIN (ref 70–130)
CREAT CL PREDICTED SERPL C-G-VRATE: 69 ML/MIN (ref 70–130)
GLOBULIN SER CALC-MCNC: 3.4 G/DL (ref 2.4–3.5)
GLUCOSE SERPL-MCNC: 152 MG/DL (ref 83–110)
GLUCOSE SERPL-MCNC: 187 MG/DL (ref 83–110)
HGB BLD-MCNC: 12.6 G/DL (ref 12–16)
MCH RBC QN AUTO: 30.6 PG (ref 27–31)
MCV RBC AUTO: 94.5 FL (ref 78–98)
MDIFF COMPLETE?: YES
PLATELET # BLD AUTO: 204 THOU/UL (ref 130–400)
POTASSIUM SERPL-SCNC: 2.8 MMOL/L (ref 3.5–5.1)
POTASSIUM SERPL-SCNC: 3.9 MMOL/L (ref 3.5–5.1)
POTASSIUM SERPL-SCNC: 4.2 MMOL/L (ref 3.5–5.1)
RBC # BLD AUTO: 4.11 MILL/UL (ref 4.2–5.4)
SODIUM SERPL-SCNC: 137 MMOL/L (ref 136–145)
SODIUM SERPL-SCNC: 138 MMOL/L (ref 136–145)
WBC # BLD AUTO: 11.5 THOU/UL (ref 4.8–10.8)

## 2020-11-24 PROCEDURE — 0T788DZ DILATION OF BILATERAL URETERS WITH INTRALUMINAL DEVICE, VIA NATURAL OR ARTIFICIAL OPENING ENDOSCOPIC: ICD-10-PCS | Performed by: UROLOGY

## 2020-11-24 PROCEDURE — 06HY33Z INSERTION OF INFUSION DEVICE INTO LOWER VEIN, PERCUTANEOUS APPROACH: ICD-10-PCS | Performed by: EMERGENCY MEDICINE

## 2020-11-24 RX ADMIN — MEROPENEM AND SODIUM CHLORIDE SCH MLS: 1 INJECTION, SOLUTION INTRAVENOUS at 12:33

## 2020-11-24 RX ADMIN — AMIODARONE HYDROCHLORIDE SCH MLS: 50 INJECTION, SOLUTION INTRAVENOUS at 21:50

## 2020-11-24 RX ADMIN — AMIODARONE HYDROCHLORIDE SCH MLS: 50 INJECTION, SOLUTION INTRAVENOUS at 07:06

## 2020-11-24 RX ADMIN — VANCOMYCIN SCH MLS: 1.5 INJECTION, SOLUTION INTRAVENOUS at 09:33

## 2020-11-24 RX ADMIN — MEROPENEM AND SODIUM CHLORIDE SCH MLS: 1 INJECTION, SOLUTION INTRAVENOUS at 03:43

## 2020-11-24 RX ADMIN — MEROPENEM AND SODIUM CHLORIDE SCH MLS: 1 INJECTION, SOLUTION INTRAVENOUS at 20:28

## 2020-11-24 NOTE — PDOC.BPN
- Brief Progress Note





Discussed patient's case with her urologist Dr. Vegas this morning, who states 

he will be in to see her this morning due to her illness severity.

## 2020-11-24 NOTE — RAD
EXAM: Retrograde IVP



HISTORY: Ureteral stones



COMPARISON: CT abdomen/pelvis 11/23/2020 and IVP 7/27/2020



FINDINGS/IMPRESSION: Limited intraoperative fluoroscopic views of the retrograde IVP were submitted f
or interpretation. There is a left femoral central venous catheter. A catheter is initially placed

in the left renal collecting system. There is moderate left hydronephrosis. A left ureteral stent is 
eventually placed in good position.



There is attempt at passage of a catheter into the right renal collecting system. There is extravasat
ion of contrast in the region of the right ureterovesical junction. Eventually, a catheter is

placed into the right kidney and there is mild to moderate right hydronephrosis. Eventually, a right 
double-J ureteral stent is placed in good position and appears to span the area of extravasation.



Reported By: Arcenio Alfaro 

Electronically Signed:  11/24/2020 10:27 AM
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
## 2020-11-24 NOTE — OP
DATE OF PROCEDURE:  11/24/2020



PREOPERATIVE DIAGNOSES:  

1. Sepsis/urinary tract infection.

2. Bilateral ureteral stones.

3. Bilateral hydronephrosis.



POSTOPERATIVE DIAGNOSES:  

1. Sepsis/urinary tract infection.

2. Bilateral ureteral stones.

3. Bilateral hydronephrosis.



PROCEDURES PERFORMED:  

1. Cystoscopy.

2. Bilateral retrogrades.

3. Bilateral stent placement.



ANESTHETIC:  TIVA.



ESTIMATED BLOOD LOSS:  Minimal.



FINDINGS:  She had purulent urine in her bladder.  She had purulent urine behind

both regions of ureteral obstruction.  Left urine and right urine were sent for

culture.  I could not see her stones on her  KUB because of overlying stool and

gas, but you could see contrast in both collecting systems coming down to the pelvic

inlet bilaterally with bilateral hydro.  She has a rather tight right ureteral

orifice.  She had extravasation.  I was trying to get a wire across this point, but

we were able to get a wire up on that side.  I do not know if this is related to a

stone in that region or if it is just related to the multiple stones she has had on

the right side and procedure she has had, but we did get a stent up without

difficulty once we get a guidewire by it.  She had bilateral 6 x 24 double-J stents

placed, no string was attached, and a 16-St Lucian Tellez. 



DESCRIPTION OF PROCEDURE:  After obtaining verbal consent from the patient's

daughter, she was taken from the ICU to the operating room.  She was placed in the

supine position on the treatment table.  PlexiPulses were placed on her lower

extremities and turned on.  She was given some ketamine for IV anesthesia, but no

intubation.  She was placed in the dorsal lithotomy position.  She was sterilely

prepped and draped for the above procedure.  The fluoroscopy unit was then brought

down over her and a  KUB was taken.  Cystoscopy was performed with a 22-St Lucian

sheath, this was well lubricated and passed under direct vision through the female

urethra into the bladder.  The bladder was filled and emptied a number of times in

order for us to easily visualize the ureteral orifices.  The left ureteral orifice

was cannulated with a guidewire and easily went up into the region of the renal

pelvis.  A 5-St Lucian Pollack catheter followed over this and we drained about 20 mL

of purulent urine from this.  We replaced the guidewire and passed a 6 x 24 double-J

stent, pushing up in place with aid of a pusher, so its proximal end coiled in the

upper calyceal system and its lower end coiled in the bladder when the wire was

removed.  We attempted the same on the right side; however, we could not get a wire

or open-ended catheter manipulate the distal ureter.  We required an angled

Glidewire.  We did get some extravasation of contrast in this region, which

suggested a small perforation of the distal right ureter.  We were able, however, to

manipulate an angled Glidewire by this point and up into the renal pelvis and a

5-St Lucian Pollack catheter then fairly easily followed this.  We drained about 30 mL

of purulent urine from this side also.  The guidewire was replaced up this

open-ended catheter and then a 6 x 24 Polaris double-J stent was placed over this

guidewire and pushed up into place with aid of a pusher, so its proximal end coiled

in the renal pelvis and its distal end coiled in the bladder when the wire was

removed.  At this point, a Tellez catheter was placed.  She was not extubated as she

was never intubated and she was not taken to recovery room, but she was taken back

up to the intensive care unit. 







Job ID:  [id number]

## 2020-11-24 NOTE — HP
CHIEF COMPLAINT:  Fever, flank pain in a lady with history of nephrolithiasis.



HISTORY OF PRESENT ILLNESS:  Ms. Matute is a 75-year-old lady who presented to the

emergency room this morning with flank pain, __________ fever, chills.  She was

found to have some retained very large stones in her ureter with bilateral

hydronephrosis.  Urology was consulted and she will be taken for a stent placement

and stone retrieval later this morning. 



PHYSICAL EXAMINATION:

VITAL SIGNS:  On exam, her blood pressure is 160/70, heart rate is 120, respirations

16, her sats on 3 L is 99%, her temperature is a 101.2. 

GENERAL:  She is slightly lethargic, but answers questions.   

EAR, NOSE AND THROAT:  No erythema or exudate.   

CARDIAC:  Heart rhythm regular.  No gallop or murmur noted.  LUNGS:  Clear without

rales or wheezes.  No respiratory distress. 

ABDOMEN:  Flat, soft.  No guarding or rebound.  She does have some left flank

tenderness. 

NEUROLOGIC:  She has a prior right hemiparesis.



LABORATORY DATA:  Her initial white count was 91848, is now 11,500, hemoglobin 14.5,

hematocrit 44.4 with an MCV of 92.  She has a markedly left shift with 26 bands.

Chemistries; sodium 137.  Her potassium initially was 2.8, but this has been

replaced vigorously.  Chloride is 109, bicarb is 15, BUN 13, creatinine 0.98.  Her

lactic acid was initially 4, dropping to 3.8. 



Her abdominal pelvic CT shows hydronephrosis and multiple large stones.



ASSESSMENT:  

1. Urinary tract infection with sepsis.

2. Septic shock.

3. Nephrolithiasis.

4. Pyelonephritis.



PLAN:  The patient will be taken to surgery for a stone retrieval, stent placement.

We will continue with pressors, fluids and I have also consulted Dr. Mayberry for his

input.   __________ has seen the patient, is taking her to surgery. 







Job ID:  [id number]

## 2020-11-24 NOTE — CT
CT of the abdomen and pelvis:

11/23/2020



COMPARISON: 7/9/2020



HISTORY: Left flank pain



TECHNIQUE: Axial CT imaging at 5 mm intervals from the lung bases through the pubic symphysis with IV
 contrast. Coronal and sagittal reformatted imaging obtained.



FINDINGS: There is increased linear density within both lung bases with infiltrate or volume loss inv
olving the inferior posterior aspect of bilateral lower lobes. No free intraperitoneal air is seen.



There is a left femoral vascular catheter.



Multiple calcified stones are seen within the gallbladder. The liver and spleen appear unremarkable. 
There is fatty atrophy of the pancreas. The adrenal glands appear unremarkable.



There is perinephric stranding and nephromegaly on the left which appears new when compared to the mo
st recent prior CT. There is hydronephrosis and hydroureter on the left secondary to an obstructing

stone within the mid left ureter measuring approximately 9 mm in craniocaudal dimension, seen at the 
axial level of the L4-5 disc interspace. Further distal to this is an additional obstructing stone

within the left ureter measuring approximately 1.2 cm in craniocaudal dimension, at the axial level o
f the mid sacroiliac joint. These 2 obstructing stones within the left ureter were not present on

the 7/9/2020 exam. A stone on the 7/9/2020 exam within the distal left ureter is no longer visualized
.



There is a Tellez catheter within the urinary bladder.



There is mild perinephric stranding with hydronephrosis on the right. There are numerous intrarenal c
alculi on the right measuring up to 1.7 cm within the midpole.



There is proximal hydroureter and significant hydronephrosis on the right with a proximal obstructing
 ureteral stone measuring 1.1 cm transverse dimension and 2.0 cm craniocaudal dimension within the

right ureter at the axial level of the L3 vertebral body. This stone was not present on the 7/9/2020 
examination either. There is enhancement and thickening of the ureters which may be on the basis of

obstructive uropathy or superimposed inflammatory/infectious process.



The rectum is expanded and filled with stool suggesting fecal impaction.



There is no evidence for bowel inflammatory change or bowel obstruction.



The vascular structures appear patent. There is atherosclerotic calcification of the abdominal aorta 
and its branches. There is an IVC filter in place. The bones are markedly demineralized.



There is prominent multilevel degenerative change within the imaged spine.



IMPRESSION: Hydronephrosis and hydroureter bilaterally with obstructing stones within bilateral urete
rs (2 within the left ureter and one within the right ureter) as detailed above. Bilateral

intrarenal calculi are present as well. Thickening and enhancement of the urothelium suggest superimp
osed inflammatory or infectious process. Clinical correlation is required.



The rectum is expanded and filled with stool, evidence of fecal impaction.



Reported By: Elieser Griffiths 

Electronically Signed:  11/24/2020 12:05 AM
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
## 2020-11-25 LAB
ALBUMIN SERPL BCG-MCNC: 2.4 G/DL (ref 3.4–4.8)
ALP SERPL-CCNC: 127 U/L (ref 40–110)
ALT SERPL W P-5'-P-CCNC: 16 U/L (ref 8–55)
ANION GAP SERPL CALC-SCNC: 14 MMOL/L (ref 10–20)
AST SERPL-CCNC: 31 U/L (ref 5–34)
BILIRUB SERPL-MCNC: 0.3 MG/DL (ref 0.2–1.2)
BUN SERPL-MCNC: 18 MG/DL (ref 9.8–20.1)
CALCIUM SERPL-MCNC: 7.4 MG/DL (ref 7.8–10.44)
CHLORIDE SERPL-SCNC: 110 MMOL/L (ref 98–107)
CO2 SERPL-SCNC: 23 MMOL/L (ref 23–31)
CREAT CL PREDICTED SERPL C-G-VRATE: 94 ML/MIN (ref 70–130)
GLOBULIN SER CALC-MCNC: 3.2 G/DL (ref 2.4–3.5)
GLUCOSE SERPL-MCNC: 85 MG/DL (ref 83–110)
HGB BLD-MCNC: 11.3 G/DL (ref 12–16)
MAGNESIUM SERPL-MCNC: 2.3 MG/DL (ref 1.6–2.6)
MCH RBC QN AUTO: 30.6 PG (ref 27–31)
MCV RBC AUTO: 94.8 FL (ref 78–98)
MDIFF COMPLETE?: YES
PLATELET # BLD AUTO: 139 THOU/UL (ref 130–400)
POTASSIUM SERPL-SCNC: 3.1 MMOL/L (ref 3.5–5.1)
RBC # BLD AUTO: 3.7 MILL/UL (ref 4.2–5.4)
SODIUM SERPL-SCNC: 144 MMOL/L (ref 136–145)
VANCOMYCIN TROUGH SERPL-MCNC: 14.2 UG/ML
WBC # BLD AUTO: 34.2 THOU/UL (ref 4.8–10.8)

## 2020-11-25 RX ADMIN — POTASSIUM CHLORIDE SCH MLS: 14.9 INJECTION, SOLUTION INTRAVENOUS at 09:28

## 2020-11-25 RX ADMIN — VANCOMYCIN SCH MLS: 1.5 INJECTION, SOLUTION INTRAVENOUS at 09:29

## 2020-11-25 RX ADMIN — MEROPENEM AND SODIUM CHLORIDE SCH MLS: 1 INJECTION, SOLUTION INTRAVENOUS at 21:12

## 2020-11-25 RX ADMIN — MEROPENEM AND SODIUM CHLORIDE SCH MLS: 1 INJECTION, SOLUTION INTRAVENOUS at 03:43

## 2020-11-25 RX ADMIN — MEROPENEM AND SODIUM CHLORIDE SCH MLS: 1 INJECTION, SOLUTION INTRAVENOUS at 12:39

## 2020-11-25 RX ADMIN — POTASSIUM CHLORIDE SCH MLS: 14.9 INJECTION, SOLUTION INTRAVENOUS at 06:32

## 2020-11-25 NOTE — PRG
DATE OF SERVICE:  11/25/2020



SUBJECTIVE:  Ms. Matute is extubated, awake, and responsive to verbal stimuli.



OBJECTIVE:  VITAL SIGNS:  Her blood pressure is 116/60 and pulse 86, it is sinus. 

LUNGS:  Clear. 

CARDIAC:  Normal S1 and normal S2. 

ABDOMEN:  Soft and nontender. 

EXTREMITIES:  There is no edema.



ASSESSMENT:  

1. Tachycardia, resolved.  I think this was mostly sinus tachycardia.  The patient

may have had some supraventricular tachycardia as well, but it is difficult to say.

May have all been sinus tachycardia. 

2. 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



PLAN:  

1. Echocardiogram is pending.

2. The patient has had a previous infarct, would be appropriate to give a low-dose

beta-blockers.  Blood pressure appears stable.  We will start very low-dose

metoprolol. 







Job ID:  [id number]

## 2020-11-25 NOTE — PRG
DATE OF SERVICE:  11/25/2020



SUBJECTIVE:  Ms. Matuet is resting comfortably in bed, in no distress.  So far, her

blood and urine cultures are growing out a gram-negative denilson and __________ urine

culture and blood culture seem to be growing out also E coli.  We will await final

dispositions on the cultures hopefully by tomorrow.  At that point, we may be able

to deescalate her antibiotic coverage.  We will, however, maintain broad-spectrum

coverage until we identify the second gram-negative denilson and its sensitivities. 







Job ID:  [id number]

## 2020-11-25 NOTE — PDOC.FM
- Subjective


Subjective: 





Echo being taken upon my entry to room. 


Pt off pressor support. Maintaining maps 70-80's 


on BIcarb 150ml/hr 


K being replaced and WB elevated after yesterdays procedures. 


No acute overnight events resting comfortably this morning 





- Objective


MAR Reviewed: Yes


Vital Signs & Weight: 


                             Vital Signs (12 hours)











  Temp Pulse Ox


 


 11/25/20 03:56  98.7 F 


 


 11/25/20 00:00  98.8 F 


 


 11/24/20 20:00   95


 


 11/24/20 19:00  97.7 F 








                                     Weight











Weight                         93.44 kg











                            Most Recent Monitor Data











Heart Rate from ECG            85


 


NIBP                           110/61


 


NIBP BP-Mean                   77


 


Respiration from ECG           21


 


SpO2                           98














I&O: 


                                        











 11/23/20 11/24/20 11/25/20





 06:59 06:59 06:59


 


Intake Total  477.4 3747


 


Output Total  739 1620


 


Balance  -261.6 2127











Result Diagrams: 


                                 11/25/20 03:48





                                 11/25/20 03:48





Phys Exam





- Physical Examination


Constitutional: NAD


HEENT: moist MMs, sclera anicteric


Neck: supple, full ROM


Respiratory: no wheezing, no rales, no rhonchi, clear to auscultation bilateral


Cardiovascular: RRR


Gastrointestinal: soft, positive bowel sounds


Musculoskeletal: pulses present


Skin: no rash, normal turgor





Dx/Plan


(1) Ureterolithiasis


Code(s): N20.1 - CALCULUS OF URETER   Status: Acute   





(2) Nephrolithiasis


Status: Acute   





(3) Sepsis


Code(s): A41.9 - SEPSIS, UNSPECIFIED ORGANISM   Status: Acute   





(4) Fecal impaction


Code(s): K56.41 - FECAL IMPACTION   Status: Resolved   





- Plan


Plan: 





76 y/o F admitted for B obstructing uretolithiasis, received ureter stents by 

Dr. Vegas on 11/24, being treated for Sepsis from e coli. 





1. Sepsis 2/2 e coli from obstructing uretolithiasis 


- Urology, Dr. Vegas consulted. stent placement 11/24


- Critical care consulted, Dr. Mayberry. appreciate recommendations. Bicarb 150 

ml/hr 


- CT scan: B obstructing stones in ureters. fecal impaction without stercoral 

colitis. 


- Elevated lactate. 4-> 3.4


- Antibiotics: vanc and meropenam until blood cultures result and then consider 

deescalating. 


- Urine ccx: e coli resistant to levaquin and cipro. Blood ccx prelim e coli, 

sensitivity pending. 


- WBC increased to 34.2 11/25, likely this spike is due to instrumentation form 

urology procedure 11/24. 


- Pt received IVF bolus and central line in ED. was placed on pressor support 

before procedure. Vasopressin due to tachycardia exacerbated by levophed. Off 

pressors on 11/25. 





2. Sinus tachycardia 


- Cardiology Dr. Miller consulted, appreciate recs. 


- echo ordered, taken 11/25. 


- was on amiodarone drip 11/24-11.25. discontinued this morning and monitoring 

rate closely. 





3. Dementia 


4. Hx of TBI and R sided hemiparesis 


- lives in NH, residual deficits. PT/OT ordered


5. Hx of chronic constipation with current fecal impaction


- bowel regimen 


6. HTN, hold meds until recovered. 


7. HLD


8. GERD, home protonix 





Dispo: inpt, ICU fro sepsis. >48 hr hospital stay anticipated.

## 2020-11-25 NOTE — PRG
DATE OF SERVICE:  11/25/2020



SUBJECTIVE:  Yael Don is a 75-year-old female.  Events have been reviewed. 



She is in no distress.  She says she is sleepy.



OBJECTIVE:  VITAL SIGNS:  Heart rates in the 80s, blood pressure 120/64, 
respiratory

rates in the teens, oximetry is 100%. 

LUNGS:  Clear. 

HEART:  Regular rhythm. 

ABDOMEN:  Diffusely mildly tender.



LABORATORY DATA:  White count 34.2, hemoglobin 11.3, platelets 139.  She has 26%

bands.  Electrolytes remarkable for potassium 3.1, creatinine is normal.  COVID

screen is negative.  Urinalysis showed lot of red cells and white cells. 



Cultures are showing 2 gram-negative rods.  Blood cultures from 2 days ago are

showing E coli. 



IMPRESSION AND PLAN:  E coli sepsis with pyelonephritis, explaining her 
abdominal

tenderness.  She had bilateral retrograde studies by  __________ with 
bilateral

stent placement.  She is clinically improving.  We will follow while she is in 
the

Critical Care Unit. 



This is a 70 min consult with greater than 50% of the time spent on the unit 
with coordination of care.



Job ID:  [id number]



[facility]

## 2020-11-25 NOTE — PRG
DATE OF SERVICE:  11/25/2020



Her potassium remains low and we are continuing to aggressively replace it.

Interestingly, one of the nurses noticed earlier this morning that Ms. Matute had

apneic spells during sleep that went well beyond 10 seconds.  She would likely

benefit from a sleep study as an outpatient after her stay in the hospital. 







Job ID:  [id number]

## 2020-11-25 NOTE — PRG
DATE OF SERVICE:  11/25/2020



She is in the ICU, but transferring to telemetry.  She is off pressors.  Blood

pressure has been stable.  She is not significantly tachycardic right now.  O2 sats

are good.  She has had good urine output, it started to clear up, which was sediment

and cloudiness.  Her white count is up.  Hemoglobin is okay.  Creatinine is normal.

She has grown out E coli that has not a lot of resistance.  Her vancomycin can

probably be discontinued and she be kept on the meropenem and then switched over to

oral antibiotic in a day or 2.  On exam, her abdomen is soft, but ,

especially in the left upper quadrant.  I would assume this is from pyelonephritis,

although the nurses report she is having some diarrhea and I guess it could be

related to something else, but in general she seems to be looking a lot better.  Her

urethral catheter could be removed whenever it is no longer needed for monitoring.

The stents will stay in place until we get the stones treated. The large stones are

bilateral, this is going to take some time.  We will look at probably starting this

maybe in about a week and a half, but it would probably take multiple treatments.

My office will arrange this.  I will be away from [place] for next few

days.  Dr. Pitt is available should urologic consultation be necessary, please

contact him. 







Job ID:  [id number]

## 2020-11-26 LAB
ALBUMIN SERPL BCG-MCNC: 2.2 G/DL (ref 3.4–4.8)
ALP SERPL-CCNC: 86 U/L (ref 40–110)
ALT SERPL W P-5'-P-CCNC: 10 U/L (ref 8–55)
ANION GAP SERPL CALC-SCNC: 11 MMOL/L (ref 10–20)
ANION GAP SERPL CALC-SCNC: 11 MMOL/L (ref 10–20)
AST SERPL-CCNC: 22 U/L (ref 5–34)
BILIRUB SERPL-MCNC: 0.3 MG/DL (ref 0.2–1.2)
BUN SERPL-MCNC: 10 MG/DL (ref 9.8–20.1)
BUN SERPL-MCNC: 12 MG/DL (ref 9.8–20.1)
CALCIUM SERPL-MCNC: 7.2 MG/DL (ref 7.8–10.44)
CALCIUM SERPL-MCNC: 7.2 MG/DL (ref 7.8–10.44)
CHLORIDE SERPL-SCNC: 110 MMOL/L (ref 98–107)
CHLORIDE SERPL-SCNC: 110 MMOL/L (ref 98–107)
CO2 SERPL-SCNC: 27 MMOL/L (ref 23–31)
CO2 SERPL-SCNC: 27 MMOL/L (ref 23–31)
CREAT CL PREDICTED SERPL C-G-VRATE: 129 ML/MIN (ref 70–130)
CREAT CL PREDICTED SERPL C-G-VRATE: 130 ML/MIN (ref 70–130)
GLOBULIN SER CALC-MCNC: 3 G/DL (ref 2.4–3.5)
GLUCOSE SERPL-MCNC: 101 MG/DL (ref 83–110)
GLUCOSE SERPL-MCNC: 71 MG/DL (ref 83–110)
HGB BLD-MCNC: 10.3 G/DL (ref 12–16)
MAGNESIUM SERPL-MCNC: 2 MG/DL (ref 1.6–2.6)
MCH RBC QN AUTO: 30.5 PG (ref 27–31)
MCV RBC AUTO: 94.9 FL (ref 78–98)
MDIFF COMPLETE?: YES
PLATELET # BLD AUTO: 123 THOU/UL (ref 130–400)
POTASSIUM SERPL-SCNC: 2.9 MMOL/L (ref 3.5–5.1)
POTASSIUM SERPL-SCNC: 3.4 MMOL/L (ref 3.5–5.1)
RBC # BLD AUTO: 3.38 MILL/UL (ref 4.2–5.4)
SODIUM SERPL-SCNC: 145 MMOL/L (ref 136–145)
SODIUM SERPL-SCNC: 145 MMOL/L (ref 136–145)
WBC # BLD AUTO: 23.1 THOU/UL (ref 4.8–10.8)

## 2020-11-26 RX ADMIN — SULFAMETHOXAZOLE AND TRIMETHOPRIM SCH TAB: 800; 160 TABLET ORAL at 08:01

## 2020-11-26 RX ADMIN — SULFAMETHOXAZOLE AND TRIMETHOPRIM SCH TAB: 800; 160 TABLET ORAL at 20:52

## 2020-11-26 RX ADMIN — MEROPENEM AND SODIUM CHLORIDE SCH MLS: 1 INJECTION, SOLUTION INTRAVENOUS at 03:15

## 2020-11-26 NOTE — PDOC.FM
- Subjective


Subjective: 





NAEO. Patient very confused on exam this AM & had no recollection of any events 

during her hospital stay or the place or date/time. Denied being in any pain. 





- Objective


MAR Reviewed: Yes


Vital Signs & Weight: 


                             Vital Signs (12 hours)











  Temp Pulse Resp BP Pulse Ox


 


 11/26/20 03:19  98.5 F  95  18  123/57 L  92 L


 


 11/25/20 19:45  97.6 F  92  18  110/55 L  97








                                     Weight











Weight                         94.347 kg











                            Most Recent Monitor Data











Heart Rate from ECG            86


 


NIBP                           125/62


 


NIBP BP-Mean                   83


 


Respiration from ECG           15


 


SpO2                           100














I&O: 


                                        











 11/24/20 11/25/20 11/26/20





 06:59 06:59 06:59


 


Intake Total 477.4 3747 3370


 


Output Total 739 1620 2340


 


Balance -261.6 2127 1030











Result Diagrams: 


                                 11/26/20 04:33





                                 11/26/20 04:33





Phys Exam





- Physical Examination


Constitutional: NAD


HEENT: moist MMs


Neck: supple, full ROM


Respiratory: no wheezing, no rales, no rhonchi, clear to auscultation bilateral


Cardiovascular: RRR, no significant murmur


Gastrointestinal: soft, non-tender


Musculoskeletal: no edema


Neurological: non-focal, moves all 4 limbs


Psychiatric: normal affect


Deviation from normal: oriented to person only


Skin: no rash





Dx/Plan


(1) Ureterolithiasis


Code(s): N20.1 - CALCULUS OF URETER   Status: Acute   





(2) Complicated UTI (urinary tract infection)


Code(s): N39.0 - URINARY TRACT INFECTION, SITE NOT SPECIFIED   Status: Acute   





(3) Hypokalemia


Code(s): E87.6 - HYPOKALEMIA   Status: Acute   





(4) Sepsis secondary to UTI


Code(s): A41.9 - SEPSIS, UNSPECIFIED ORGANISM; N39.0 - URINARY TRACT INFECTION, 

SITE NOT SPECIFIED   Status: Acute   





(5) Thrombocytopenia


Code(s): D69.6 - THROMBOCYTOPENIA, UNSPECIFIED   Status: Acute   





(6) Dyslipidemia


Code(s): E78.5 - HYPERLIPIDEMIA, UNSPECIFIED   Status: Chronic   





(7) H/O traumatic brain injury


Code(s): Z87.820 - PERSONAL HISTORY OF TRAUMATIC BRAIN INJURY   Status: Chronic 

 





(8) HTN (hypertension)


Code(s): I10 - ESSENTIAL (PRIMARY) HYPERTENSION   Status: Chronic   


Qualifiers: 


 





(9) History of MI (myocardial infarction)


Code(s): I25.2 - OLD MYOCARDIAL INFARCTION   Status: Chronic   





- Plan


Plan: 





74 y/o F admitted for Urosepsis 2/2 B/L obstructing uretolithiasis who is post-

op day #2 s/p B/L ureter stent placement by Dr. Vegas.





1. E Coli bacteremia: 


- Central line & urine Cxs + for e coli resistant to fluoroquinolones. 


- Will transition to PO bactrim today & d/c vanc and meropenam. Will likely need

to complete a 14 day course total. 





2. Sepsis 2/2 e coli bacteremia from B/L obstructing uretolithiasis, resolved


- Urology, Dr. Vegas consulted & patient underwent B/L ureteral stent placement

on 11/24 & has since markedly improved clinically.


- Renal function and UO markedly improved. Will d/c li today & continue to 

monitor strict I&Os to ensure patient can void well on her own.


- Will need outpatient follow-up with Dr. Vegas ~1 week from discharge for 

continued stone management. 





3. Sinus tachycardia vs. SVT, resolved.


- Cardiology Dr. Miller consulted, appreciate recs. 


- Echo w/ NL EF of 55-60% & no wall motion defects. 


- Continue low dose metoprolol per cards recs as BP allows.





4. Dementia 


- Uncertain baseline mental status. Will attempt to contact family today to 

better gauge this & ask about placement vs. being safe for d/c home with close 

follow-up with PCP and Urology.





5. Hx of TBI and R sided hemiparesis 


- lives in NH, residual deficits. PT/OT ordered





6. Hx of chronic constipation with current fecal impaction


- bowel regimen 





7. HTN, 


- no home meds documented in chart or last dc summary


- Continue toprol per cards recs & will escalate meds PRN 





8. HLD, continue home meds.





9. GERD, home protonix 





Dispo: Will ADAT per ST recs & continue close monitoring on PO abx over course 

of today. Possible d/c home tomorrow pending clinical course. 








Addendum - Attending





- Attending Attestation


Date/Time: 11/26/20 3114





I personally evaluated the patient and discussed the management with Dr. Sidhu.


I agree with the History, Examination, Assessment and Plan documented above with

any addition or exceptions noted below.


Patient is very confused this morning.  I am unsure of her baseline as this is 

the first time I'm seeing her.  Leukocytosis improving.  Will replace potassium.

 Pt still has li, will check with urology regarding whether the li will be

needed long-term.  Continue antibiotics.

## 2020-11-26 NOTE — PRG
DATE OF SERVICE:  11/26/2020



SUBJECTIVE:  The patient is doing well.  She was transferred out of the ICU to

telemetry.  She denies any pain.  Remains on meropenem. 



OBJECTIVE:  VITAL SIGNS:  Temperature 98.5, pulse 95, respirations 18, oxygen

saturation 92% on room air, and blood pressure 123/57. 

GENERAL:  She is awake and alert, no apparent distress. 

CARDIOVASCULAR:  Regular rate and rhythm. 

PULMONARY:  Breathing unlabored. 

ABDOMEN:  Soft, nontender/nondistended.  No CVA tenderness. 

EXTREMITIES:  Warm, well perfused.  No edema. 

NEUROLOGIC:  No focal deficits.



LABORATORY DATA:  White blood cell count 23.1, hemoglobin 10.3, hematocrit 32.1, and

platelets 123.  Sodium 145, potassium 2.9, chloride 110, bicarb 27, BUN 12, and

creatinine 0.54. 



Microbiology; urine culture and blood culture growing Escherichia coli and urine

culture with second growth of Proteus, Escherichia coli, resistant only to

fluoroquinolones. 



ASSESSMENT:  A 75-year-old female with Escherichia coli sepsis secondary to urinary

tract infection and bilateral ureteral stones, status post bilateral ureteral stent

placement. 



PLAN:  From Urologic standpoint, the patient is stable.  She is adequately

decompressed.  She has improved clinically.  She can follow up with Dr. Vegas for

definitive management of these ureteral stones. 







Job ID:  [id number]

## 2020-11-27 LAB
ALBUMIN SERPL BCG-MCNC: 2.2 G/DL (ref 3.4–4.8)
ALP SERPL-CCNC: 153 U/L (ref 40–110)
ALT SERPL W P-5'-P-CCNC: 14 U/L (ref 8–55)
ANION GAP SERPL CALC-SCNC: 11 MMOL/L (ref 10–20)
AST SERPL-CCNC: 26 U/L (ref 5–34)
BILIRUB SERPL-MCNC: 0.3 MG/DL (ref 0.2–1.2)
BUN SERPL-MCNC: 9 MG/DL (ref 9.8–20.1)
CALCIUM SERPL-MCNC: 7.2 MG/DL (ref 7.8–10.44)
CHLORIDE SERPL-SCNC: 113 MMOL/L (ref 98–107)
CO2 SERPL-SCNC: 25 MMOL/L (ref 23–31)
CREAT CL PREDICTED SERPL C-G-VRATE: 124 ML/MIN (ref 70–130)
GLOBULIN SER CALC-MCNC: 3.1 G/DL (ref 2.4–3.5)
GLUCOSE SERPL-MCNC: 80 MG/DL (ref 83–110)
HGB BLD-MCNC: 10.6 G/DL (ref 12–16)
MCH RBC QN AUTO: 30.6 PG (ref 27–31)
MCV RBC AUTO: 94.7 FL (ref 78–98)
MDIFF COMPLETE?: YES
PLATELET # BLD AUTO: 128 THOU/UL (ref 130–400)
POTASSIUM SERPL-SCNC: 3.9 MMOL/L (ref 3.5–5.1)
RBC # BLD AUTO: 3.48 MILL/UL (ref 4.2–5.4)
SODIUM SERPL-SCNC: 145 MMOL/L (ref 136–145)
WBC # BLD AUTO: 19.2 THOU/UL (ref 4.8–10.8)

## 2020-11-27 RX ADMIN — SULFAMETHOXAZOLE AND TRIMETHOPRIM SCH TAB: 800; 160 TABLET ORAL at 21:10

## 2020-11-27 RX ADMIN — SULFAMETHOXAZOLE AND TRIMETHOPRIM SCH TAB: 800; 160 TABLET ORAL at 08:51

## 2020-11-27 RX ADMIN — POTASSIUM & SODIUM PHOSPHATES POWDER PACK 280-160-250 MG SCH PKT: 280-160-250 PACK at 08:51

## 2020-11-27 RX ADMIN — POTASSIUM & SODIUM PHOSPHATES POWDER PACK 280-160-250 MG SCH PKT: 280-160-250 PACK at 13:08

## 2020-11-27 RX ADMIN — POTASSIUM & SODIUM PHOSPHATES POWDER PACK 280-160-250 MG SCH PKT: 280-160-250 PACK at 16:45
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
## 2020-11-28 LAB
ALBUMIN SERPL BCG-MCNC: 2.4 G/DL (ref 3.4–4.8)
ALP SERPL-CCNC: 78 U/L (ref 40–110)
ALT SERPL W P-5'-P-CCNC: 9 U/L (ref 8–55)
ANION GAP SERPL CALC-SCNC: 11 MMOL/L (ref 10–20)
AST SERPL-CCNC: 16 U/L (ref 5–34)
BILIRUB SERPL-MCNC: 0.4 MG/DL (ref 0.2–1.2)
BUN SERPL-MCNC: 9 MG/DL (ref 9.8–20.1)
CALCIUM SERPL-MCNC: 7.4 MG/DL (ref 7.8–10.44)
CHLORIDE SERPL-SCNC: 111 MMOL/L (ref 98–107)
CO2 SERPL-SCNC: 28 MMOL/L (ref 23–31)
CREAT CL PREDICTED SERPL C-G-VRATE: 127 ML/MIN (ref 70–130)
GLOBULIN SER CALC-MCNC: 3 G/DL (ref 2.4–3.5)
GLUCOSE SERPL-MCNC: 105 MG/DL (ref 83–110)
HGB BLD-MCNC: 10.7 G/DL (ref 12–16)
MAGNESIUM SERPL-MCNC: 1.8 MG/DL (ref 1.6–2.6)
MCH RBC QN AUTO: 29.9 PG (ref 27–31)
MCV RBC AUTO: 92.5 FL (ref 78–98)
MDIFF COMPLETE?: YES
PLATELET # BLD AUTO: 122 THOU/UL (ref 130–400)
POTASSIUM SERPL-SCNC: 3.6 MMOL/L (ref 3.5–5.1)
RBC # BLD AUTO: 3.57 MILL/UL (ref 4.2–5.4)
SODIUM SERPL-SCNC: 146 MMOL/L (ref 136–145)
WBC # BLD AUTO: 9.7 THOU/UL (ref 4.8–10.8)

## 2020-11-28 RX ADMIN — MEROPENEM AND SODIUM CHLORIDE SCH MLS: 1 INJECTION, SOLUTION INTRAVENOUS at 01:22

## 2020-11-28 RX ADMIN — SULFAMETHOXAZOLE AND TRIMETHOPRIM SCH TAB: 800; 160 TABLET ORAL at 08:41

## 2020-11-28 RX ADMIN — MEROPENEM AND SODIUM CHLORIDE SCH MLS: 1 INJECTION, SOLUTION INTRAVENOUS at 08:43

## 2020-11-28 RX ADMIN — MEROPENEM AND SODIUM CHLORIDE SCH: 1 INJECTION, SOLUTION INTRAVENOUS at 17:25

## 2020-11-28 RX ADMIN — SULFAMETHOXAZOLE AND TRIMETHOPRIM SCH TAB: 800; 160 TABLET ORAL at 20:45

## 2020-11-28 NOTE — PRG
DATE OF SERVICE:  11/28/2020



Please see the note done by Dr. Malathi Metcalf, for which I agree.  The patient is

here for E coli bacteremia.  __________ pyelonephritis from stones, but then

improved after nephrostomy tubes were placed.  Doing a lot better, now on Bactrim

and meropenem and we are just waiting on her placement pending as she is stable

otherwise to go out on antibiotics. 







Job ID:  [id number]

## 2020-11-28 NOTE — PDOC.FM
- Subjective


Subjective: 


Ms. Matute is feeling well this morning. She is A&O only to self, which is her 

baseline. She denies feeling ill, feeling pain, fever/chills, nausea/V.








- Objective


Vital Signs & Weight: 


                             Vital Signs (12 hours)











  Temp Pulse Resp BP Pulse Ox


 


 11/28/20 03:48  97.7 F  78  20  138/87  94 L


 


 11/28/20 01:40      92 L


 


 11/27/20 21:04  98.6 F  77  16  149/72 H  92 L








                                     Weight











Weight                         95.889 kg











                            Most Recent Monitor Data











Heart Rate from ECG            86


 


NIBP                           125/62


 


NIBP BP-Mean                   83


 


Respiration from ECG           15


 


SpO2                           100














I&O: 


                                        











 11/26/20 11/27/20 11/28/20





 06:59 06:59 06:59


 


Intake Total 3370 240 50


 


Output Total 2340  600


 


Balance 1030 240 -550











Result Diagrams: 


                                 11/28/20 04:26





                                 11/28/20 04:26





Phys Exam





- Physical Examination


Constitutional: NAD


Neck: supple


Respiratory: no wheezing, clear to auscultation bilateral


Cardiovascular: RRR, no significant murmur


Musculoskeletal: no edema, pulses present (1+ dp b/l)


A&O x1 which is baseline


Psychiatric: normal affect





Dx/Plan





- Plan


Plan: 


74 y/o F admitted for Urosepsis 2/2 B/L obstructing uretolithiasis who is post-

op day #4 s/p B/L ureter stent placement by Dr. Vegas.





E Coli bacteremia


- Central line and UCx + for E. coli resistant to fluoroquinolones


- UCx + for E coli , and Proteus 


- PO bactrim & meropenem 


- Placement to swing bed pending





Sepsis 2/2 E. coli bacteremia from B/L obstructing uretolithiasis, resolved


- Urology, Dr. Vegas consulted & patient underwent B/L ureteral stent placement

on 11/24 & has since markedly improved clinically.


- Renal function and UO markedly improved. 


   Monitor UO since li removal to ensure patient can void well on her own.


- Will need outpatient follow-up with Dr. Vegas ~1 week from discharge for 

continued stone management. 





Proteus & E coli complicated UTI


- E coli has same sensitivities as in blood 


- UCx also shows proteus which is multi-resistant & sensitive only to IV abx. 


- Will complete a 7 day course of Meropenem total 





Hypernatremia


- Na 146 this am


- Suspect this is 2/2 mild dehydration d/t change in diet since being in the 

hospital


- LR 100mL/h 


- Monitor with am BMP





Sinus tachycardia vs. SVT, resolved


- Cardiology Dr. Miller on board, appreciate recs. 


- Echo w/ EF of 55-60% & no wall motion defects. 


- Continue low dose metoprolol per cards recs, as BP allows.





Hx of TBI and R sided hemiparesis 


- Per daughter, TBI in 2014 has left patient with essentially no short term 

memory at baseline. 


- lives at home with daughter, has lift and equipment needed to move patient as 

she has been non-ambulatory since incident in 2014. 


- Would like to try for a short term stay at a [facility] if possible as

patient has benefitted from a stay like this in the past.





Hx of chronic constipation with current fecal impaction


- Diarrhea since admission. 


- Likely iatrogenic from abx as c diff studies negative yesterday. 


- Continue bowel regimen only PRN. 





HTN 


- no home meds documented in chart or last dc summary


- Continue toprol per cards recs & will escalate meds PRN 





HLD


- Continue home meds.





GERD


- Home protonix vs transitioning to an H2 blocker as patient is already high 

risk for fractures given her sedentary state at baseline. 








Diet: HH puree thick


Abx: Meropenem, Bactrim


CVT: Lov 40


IVF: LR 100mL/h


Dispo: Will advance diet as tolerated per ST recs. Touch base with OT & CM for 

placement recs as patient is now needing several more days of IV abx therapy.

## 2020-11-29 LAB
ALBUMIN SERPL BCG-MCNC: 2.3 G/DL (ref 3.4–4.8)
ALP SERPL-CCNC: 71 U/L (ref 40–110)
ALT SERPL W P-5'-P-CCNC: 9 U/L (ref 8–55)
ANION GAP SERPL CALC-SCNC: 11 MMOL/L (ref 10–20)
AST SERPL-CCNC: 14 U/L (ref 5–34)
BILIRUB SERPL-MCNC: 0.3 MG/DL (ref 0.2–1.2)
BUN SERPL-MCNC: 7 MG/DL (ref 9.8–20.1)
CALCIUM SERPL-MCNC: 7.5 MG/DL (ref 7.8–10.44)
CHLORIDE SERPL-SCNC: 109 MMOL/L (ref 98–107)
CO2 SERPL-SCNC: 25 MMOL/L (ref 23–31)
CREAT CL PREDICTED SERPL C-G-VRATE: 125 ML/MIN (ref 70–130)
GLOBULIN SER CALC-MCNC: 2.9 G/DL (ref 2.4–3.5)
GLUCOSE SERPL-MCNC: 85 MG/DL (ref 83–110)
HGB BLD-MCNC: 10.4 G/DL (ref 12–16)
MCH RBC QN AUTO: 30.2 PG (ref 27–31)
MCV RBC AUTO: 93.8 FL (ref 78–98)
MDIFF COMPLETE?: YES
PLATELET # BLD AUTO: 150 THOU/UL (ref 130–400)
POTASSIUM SERPL-SCNC: 3.7 MMOL/L (ref 3.5–5.1)
RBC # BLD AUTO: 3.45 MILL/UL (ref 4.2–5.4)
SODIUM SERPL-SCNC: 141 MMOL/L (ref 136–145)
WBC # BLD AUTO: 7.2 THOU/UL (ref 4.8–10.8)

## 2020-11-29 RX ADMIN — SULFAMETHOXAZOLE AND TRIMETHOPRIM SCH TAB: 800; 160 TABLET ORAL at 20:45

## 2020-11-29 RX ADMIN — SULFAMETHOXAZOLE AND TRIMETHOPRIM SCH TAB: 800; 160 TABLET ORAL at 08:53

## 2020-11-29 NOTE — PDOC.FM
- Subjective


Subjective: 


Mrs. Matute is doing well this morning. She complained of some chest pain that she

denied by the end of my visit with her. She denies any other pain.








- Objective


Vital Signs & Weight: 


                             Vital Signs (12 hours)











  Temp Pulse Resp BP Pulse Ox


 


 11/29/20 03:16  96.9 F L  77  16  148/76 H  92 L


 


 11/29/20 01:04      92 L


 


 11/28/20 20:41  98.1 F  75  16  138/66  92 L








                                     Weight











Weight                         93.848 kg











                            Most Recent Monitor Data











Heart Rate from ECG            86


 


NIBP                           125/62


 


NIBP BP-Mean                   83


 


Respiration from ECG           15


 


SpO2                           100














I&O: 


                                        











 11/27/20 11/28/20 11/29/20





 06:59 06:59 06:59


 


Intake Total 078 59 8472


 


Output Total  600 600


 


Balance 240 -550 800











Result Diagrams: 


                                 11/29/20 04:25





                                 11/29/20 04:25





Phys Exam





- Physical Examination


Constitutional: NAD


Neck: supple


Respiratory: clear to auscultation bilateral


Cardiovascular: RRR, no significant murmur


Gastrointestinal: soft, non-tender, no distention


Musculoskeletal: pulses present, edema present (1+ pitting edema of R foot and 

mild edema of R hand, despite elevation)


R sided hemiparesis, at baseline


Psychiatric: normal affect


A&O x1, at baseline


Skin: no rash





Dx/Plan





- Plan


Plan: 


74 y/o F admitted for Urosepsis 2/2 B/L obstructing uretolithiasis who is post-

op day #4 s/p B/L ureter stent placement by Dr. Vegas.





E Coli bacteremia


- Central line and UCx + for E. coli resistant to fluoroquinolones


- UCx + for E coli , and Proteus 


- PO bactrim & meropenem 


- Placement to SNF pending





Sepsis 2/2 E. coli bacteremia from B/L obstructing uretolithiasis, resolved


- Urology, Dr. Vegas consulted & patient underwent B/L ureteral stent placement

on 11/24 & has since markedly improved clinically.


- Renal function and UO markedly improved. 


   Monitor UO since li removal to ensure patient can void well on her own.


- Will need outpatient follow-up with Dr. Vegas ~1 week from discharge for 

continued stone management. 





Proteus & E coli complicated UTI


- E coli has same sensitivities as in blood 


- UCx also shows proteus which is multi-resistant & sensitive only to IV abx. 


- Will complete a 7 day course of Meropenem total 





Hx of TBI and R sided hemiparesis 


- Per daughter, TBI in 2014 has left patient with essentially no short term 

memory at baseline. 


- lives at home with daughter, has lift and equipment needed to move patient as 

she has been non-ambulatory since incident in 2014. 


- Would like to try for a short term stay at a [facility] if possible as

patient has benefitted from a stay like this in the past.





Hx of chronic constipation with current fecal impaction


- Fecal impaction resolved with diarrhea on arrival


- Likely iatrogenic from abx as c diff studies negative yesterday. 


- Continue bowel regimen only PRN. 





HTN 


- no home meds documented in chart or last dc summary


- Continue toprol per cards recs 


- Will increase to 37.5mg daily





HLD


- Continue home meds.





GERD


- Home protonix vs transitioning to an H2 blocker as patient is already high 

risk for fractures given her sedentary state at baseline. 


- Will mention in discharge summary for PCP to consider





Hypernatremia, resolved


- Na 146 > 141


- Suspect this was 2/2 mild dehydration d/t change in diet since being in the 

hospital


- LR 100mL/h 





Sinus tachycardia vs. SVT, resolved


- Cardiology Dr. Miller on board, appreciate recs. 


- Echo w/ EF of 55-60% & no wall motion defects. 


- Continue low dose metoprolol per cards recs, as BP allows.





Diet: HH puree thick


Abx: Meropenem, Bactrim


CVT: Lov 40


IVF: LR 100mL/h


Dispo: Will advance diet as tolerated per ST recs. Touch base with OT & CM for 

placement recs as patient is now needing several more days of IV abx therapy.

## 2020-11-29 NOTE — PRG
DATE OF SERVICE:  11/29/2020



The patient is stable from an infection standpoint, tolerating antibiotics and seems

to be doing fine.  We will likely go to a skilled nursing facility for PT and

continued IV antibiotics hopefully tomorrow.  Blood pressure is up, so metoprolol

was increased, but exam is unchanged and is stable. 







Job ID:  [id number]

## 2020-11-30 ENCOUNTER — HOSPITAL ENCOUNTER (INPATIENT)
Dept: HOSPITAL 18 - NAV ACUTE | Age: 75
LOS: 3 days | Discharge: HOME HEALTH | DRG: 872 | End: 2020-12-03
Attending: INTERNAL MEDICINE | Admitting: INTERNAL MEDICINE
Payer: MEDICARE

## 2020-11-30 VITALS — TEMPERATURE: 98.5 F | SYSTOLIC BLOOD PRESSURE: 141 MMHG | DIASTOLIC BLOOD PRESSURE: 65 MMHG

## 2020-11-30 VITALS — BODY MASS INDEX: 33.3 KG/M2

## 2020-11-30 DIAGNOSIS — Z90.710: ICD-10-CM

## 2020-11-30 DIAGNOSIS — E78.5: ICD-10-CM

## 2020-11-30 DIAGNOSIS — I25.2: ICD-10-CM

## 2020-11-30 DIAGNOSIS — N13.6: ICD-10-CM

## 2020-11-30 DIAGNOSIS — G81.91: ICD-10-CM

## 2020-11-30 DIAGNOSIS — I10: ICD-10-CM

## 2020-11-30 DIAGNOSIS — Z74.01: ICD-10-CM

## 2020-11-30 DIAGNOSIS — Z87.820: ICD-10-CM

## 2020-11-30 DIAGNOSIS — A41.9: Primary | ICD-10-CM

## 2020-11-30 DIAGNOSIS — J44.9: ICD-10-CM

## 2020-11-30 DIAGNOSIS — K59.09: ICD-10-CM

## 2020-11-30 LAB
ALBUMIN SERPL BCG-MCNC: 2.4 G/DL (ref 3.4–4.8)
ALP SERPL-CCNC: 75 U/L (ref 40–110)
ALT SERPL W P-5'-P-CCNC: 9 U/L (ref 8–55)
ANION GAP SERPL CALC-SCNC: 9 MMOL/L (ref 10–20)
AST SERPL-CCNC: 16 U/L (ref 5–34)
BILIRUB SERPL-MCNC: 0.4 MG/DL (ref 0.2–1.2)
BUN SERPL-MCNC: 8 MG/DL (ref 9.8–20.1)
CALCIUM SERPL-MCNC: 7.8 MG/DL (ref 7.8–10.44)
CHLORIDE SERPL-SCNC: 107 MMOL/L (ref 98–107)
CO2 SERPL-SCNC: 27 MMOL/L (ref 23–31)
CREAT CL PREDICTED SERPL C-G-VRATE: 121 ML/MIN (ref 70–130)
GLOBULIN SER CALC-MCNC: 3.2 G/DL (ref 2.4–3.5)
GLUCOSE SERPL-MCNC: 77 MG/DL (ref 83–110)
HGB BLD-MCNC: 10.8 G/DL (ref 12–16)
MCH RBC QN AUTO: 30.4 PG (ref 27–31)
MCV RBC AUTO: 92.7 FL (ref 78–98)
MDIFF COMPLETE?: YES
PLATELET # BLD AUTO: 183 THOU/UL (ref 130–400)
POTASSIUM SERPL-SCNC: 3.7 MMOL/L (ref 3.5–5.1)
RBC # BLD AUTO: 3.56 MILL/UL (ref 4.2–5.4)
SODIUM SERPL-SCNC: 139 MMOL/L (ref 136–145)
WBC # BLD AUTO: 6.2 THOU/UL (ref 4.8–10.8)

## 2020-11-30 PROCEDURE — 85025 COMPLETE CBC W/AUTO DIFF WBC: CPT

## 2020-11-30 PROCEDURE — 90732 PPSV23 VACC 2 YRS+ SUBQ/IM: CPT

## 2020-11-30 PROCEDURE — 90662 IIV NO PRSV INCREASED AG IM: CPT

## 2020-11-30 PROCEDURE — 81001 URINALYSIS AUTO W/SCOPE: CPT

## 2020-11-30 PROCEDURE — G0009 ADMIN PNEUMOCOCCAL VACCINE: HCPCS

## 2020-11-30 PROCEDURE — 87086 URINE CULTURE/COLONY COUNT: CPT

## 2020-11-30 PROCEDURE — 80053 COMPREHEN METABOLIC PANEL: CPT

## 2020-11-30 PROCEDURE — 90471 IMMUNIZATION ADMIN: CPT

## 2020-11-30 PROCEDURE — G0008 ADMIN INFLUENZA VIRUS VAC: HCPCS

## 2020-11-30 RX ADMIN — Medication SCH UNITS: at 21:01

## 2020-11-30 RX ADMIN — LACTOBACILLUS ACIDOPH-L.BULGARICUS 1 MILLION CELL CHEWABLE TABLET SCH TAB: at 21:01

## 2020-11-30 RX ADMIN — SULFAMETHOXAZOLE AND TRIMETHOPRIM SCH TAB: 800; 160 TABLET ORAL at 09:11

## 2020-11-30 RX ADMIN — SULFAMETHOXAZOLE AND TRIMETHOPRIM SCH TAB: 800; 160 TABLET ORAL at 21:02

## 2020-11-30 RX ADMIN — PANTOPRAZOLE SODIUM SCH MG: 40 GRANULE, DELAYED RELEASE ORAL at 21:02

## 2020-11-30 RX ADMIN — Medication SCH ML: at 21:06

## 2020-11-30 NOTE — PDOC.FM
- Subjective


Subjective: 


Ms. Matute has no complaints this morning. She says she slept well. Nursing 

reports she has not been eating or drinking much and therefore has not had much 

urinary output or a BM in the past two days. They fed her some ice cream this 

morning.





- Objective


Vital Signs & Weight: 


                             Vital Signs (12 hours)











  Temp Pulse Resp BP Pulse Ox


 


 11/30/20 04:00  97.8 F  65  18  134/66  97


 


 11/30/20 00:00   62   


 


 11/29/20 19:42  98.6 F  65  16  165/71 H  100








                                     Weight











Weight                         92.896 kg











                            Most Recent Monitor Data











Heart Rate from ECG            86


 


NIBP                           125/62


 


NIBP BP-Mean                   83


 


Respiration from ECG           15


 


SpO2                           100














I&O: 


                                        











 11/28/20 11/29/20 11/30/20





 06:59 06:59 06:59


 


Intake Total 50 1500 240


 


Output Total 600 900 175


 


Balance -550 600 65











Result Diagrams: 


                                 11/30/20 04:17





                                 11/30/20 04:17


EKG Reviewed by me: Yes (tele: SR 60-70s, PACs)





Phys Exam





- Physical Examination


Constitutional: NAD


HEENT: sclera anicteric


Neck: full ROM


Respiratory: no wheezing, no rales, no rhonchi, clear to auscultation bilateral


Cardiovascular: RRR, no significant murmur


Gastrointestinal: soft, non-tender, positive bowel sounds


Musculoskeletal: no edema, pulses present


Neurological: moves all 4 limbs


Deviation from normal: A&O x 1





Dx/Plan





- Plan


Plan: 


76 y/o F admitted for Urosepsis 2/2 B/L obstructing uretolithiasis who is post-

op day #4 s/p B/L ureter stent placement by Dr. Vegas.





E Coli bacteremia


- Central line and UCx + for E. coli resistant to fluoroquinolones


- UCx + for E coli , and Proteus 


- bactrim (day 5) & meropenem (day 6)


- Discharging to [facility] this AM





Sepsis 2/2 E. coli bacteremia from B/L obstructing uretolithiasis, resolved


- Urology, Dr. Vegas consulted & patient underwent B/L ureteral stent placement

on 11/24 & has since markedly improved clinically.


- Renal function improved


   Decreased urine output in past 24 hours (175mL). Patient is not eating and 

drinking well and her fluids were stopped yesterday. Will bladder scan to 

evaluate for retention.


- Will need outpatient follow-up with Dr. Vegas ~1 week from discharge for 

continued stone management. 





Proteus & E coli complicated UTI


- E coli has same sensitivities as in blood 


- UCx also shows proteus which is multi-resistant & sensitive only to IV abx. 


- Day 6/7 of Meropenem





Hx of TBI and R sided hemiparesis 


- Per daughter, TBI in 2014 has left patient with essentially no short term 

memory at baseline. 


- lives at home with daughter, has lift and equipment needed to move patient as 

she has been non-ambulatory since incident in 2014.





Hx of chronic constipation with current fecal impaction


- Fecal impaction resolved with diarrhea on arrival


- Likely iatrogenic from abx as c diff studies negative yesterday. 


- Patient has not had BM in two days. However, nursing reports decreased oral 

intake. Continue bowel regimen  PRN. 





HTN 


- no home meds documented in chart or last dc summary


- Metoprolol started by cards. Increased to 50mg daily for better BP control





HLD


- Continue home meds.





GERD


- Home protonix vs transitioning to an H2 blocker as patient is already high 

risk for fractures given her sedentary state at baseline. 


- Will mention in discharge summary for PCP to consider





Hypernatremia, resolved


- Suspect this was 2/2 mild dehydration d/t change in diet since being in the 

hospital





Sinus tachycardia vs. SVT, resolved


- Cardiology Dr. Miller on board, appreciate recs. 


- Echo w/ EF of 55-60% & no wall motion defects. 


- Continue metoprolol





Diet: HH puree thick


Abx: Meropenem, Bactrim


CVT: Lov 40


IVF: SL


Dispo: Patient to be discharges to [facility] this AM

## 2020-11-30 NOTE — PRG
DATE OF SERVICE:  11/30/2020



Ms. Saleem has made a good recovery from her sepsis and urinary tract infection

secondary to multiple stones.  She is nearing time for discharge.  She has grown E.

coli from both blood and urine as well as Proteus from the urine.  She is still on

broad-spectrum antibiotics and may need to be so for quite sometime.  We will

discuss this with Dr. Vegas, but otherwise we are awaiting placement at discharge. 







Job ID:  [id number]

## 2020-12-01 LAB
ALBUMIN SERPL BCG-MCNC: 2.8 G/DL (ref 3.4–4.8)
ALP SERPL-CCNC: 81 U/L (ref 40–110)
ALT SERPL W P-5'-P-CCNC: 12 U/L (ref 8–55)
ANION GAP SERPL CALC-SCNC: 10 MMOL/L (ref 10–20)
AST SERPL-CCNC: 16 U/L (ref 5–34)
BASOPHILS # BLD AUTO: 0.1 THOU/UL (ref 0–0.2)
BASOPHILS NFR BLD AUTO: 1.2 % (ref 0–1)
BILIRUB SERPL-MCNC: 0.4 MG/DL (ref 0.2–1.2)
BUN SERPL-MCNC: 8 MG/DL (ref 9.8–20.1)
CALCIUM SERPL-MCNC: 8.1 MG/DL (ref 7.8–10.44)
CHLORIDE SERPL-SCNC: 107 MMOL/L (ref 98–107)
CO2 SERPL-SCNC: 27 MMOL/L (ref 23–31)
CREAT CL PREDICTED SERPL C-G-VRATE: 101 ML/MIN (ref 70–130)
EOSINOPHIL # BLD AUTO: 0.2 THOU/UL (ref 0–0.7)
EOSINOPHIL NFR BLD AUTO: 3.1 % (ref 0–10)
GLOBULIN SER CALC-MCNC: 3.3 G/DL (ref 2.4–3.5)
GLUCOSE SERPL-MCNC: 90 MG/DL (ref 83–110)
HGB BLD-MCNC: 12 G/DL (ref 12–16)
LYMPHOCYTES # BLD AUTO: 1.7 THOU/UL (ref 1.2–3.4)
LYMPHOCYTES NFR BLD AUTO: 26.2 % (ref 21–51)
MCH RBC QN AUTO: 29.7 PG (ref 27–31)
MCV RBC AUTO: 92.1 FL (ref 78–98)
MONOCYTES # BLD AUTO: 0.8 THOU/UL (ref 0.11–0.59)
MONOCYTES NFR BLD AUTO: 11.7 % (ref 0–10)
NEUTROPHILS # BLD AUTO: 3.8 THOU/UL (ref 1.4–6.5)
NEUTROPHILS NFR BLD AUTO: 57.9 % (ref 42–75)
PLATELET # BLD AUTO: 220 THOU/UL (ref 130–400)
POTASSIUM SERPL-SCNC: 4.1 MMOL/L (ref 3.5–5.1)
RBC # BLD AUTO: 4.05 MILL/UL (ref 4.2–5.4)
SODIUM SERPL-SCNC: 140 MMOL/L (ref 136–145)
WBC # BLD AUTO: 6.6 THOU/UL (ref 4.8–10.8)

## 2020-12-01 RX ADMIN — SULFAMETHOXAZOLE AND TRIMETHOPRIM SCH TAB: 800; 160 TABLET ORAL at 09:00

## 2020-12-01 RX ADMIN — PANTOPRAZOLE SODIUM SCH MG: 40 GRANULE, DELAYED RELEASE ORAL at 21:17

## 2020-12-01 RX ADMIN — Medication SCH ML: at 09:00

## 2020-12-01 RX ADMIN — Medication SCH ML: at 21:18

## 2020-12-01 RX ADMIN — SULFAMETHOXAZOLE AND TRIMETHOPRIM SCH TAB: 800; 160 TABLET ORAL at 21:17

## 2020-12-01 RX ADMIN — LACTOBACILLUS ACIDOPH-L.BULGARICUS 1 MILLION CELL CHEWABLE TABLET SCH TAB: at 21:17

## 2020-12-01 RX ADMIN — Medication SCH UNITS: at 21:16

## 2020-12-01 NOTE — DIS
DATE OF ADMISSION:  11/24/2020



DATE OF DISCHARGE:  11/30/2020



RESIDENT:  Tesfaye Geiger MD.



ADMITTING ATTENDING:  Salvador Mccord MD.



DISCHARGE ATTENDING:  Mike Ortiz MD.



CONSULTS:  Cardiology, Dr. Cortes.  Pulmonology, Dr. Mehta.  Urology, Dr. Vegas.

 Case Management and Speech. 



PROCEDURES:  

1.Abdomen and pelvis CT showed hydronephrosis and hydroureter bilaterally

with obstructive stones within bilateral ureters, 2 within the left ureter and 1

within the right ureter.  Bilateral intrarenal calculi, thickening and 
enhancement

of the urothelium suggesting superimposed inflammatory or infectious process.

Rectum is expanded and filled with stool, evidence of fecal impaction.  

2. EKG showed

sinus tach and SVT.  

3.Retrograde pyelogram showed moderate left hydronephrosis, the

placement of a left ureteral stent as well as placement in the right ureteral 
stent,

which showed mild to moderate right hydronephrosis.  Placement of a right 
double-J

ureteral stent.  

4.Echocardiogram showed an ejection fraction of 55% to 60%. 



PRIMARY DIAGNOSES:  

1. E coli bacteremia.

2. Sepsis secondary to E coli bacteremia from bilateral obstructing

ureterolithiasis, resolved. 

3. Proteus and E coli complicated UTI.



SECONDARY DIAGNOSES:  

1. History of TBI and right-sided hemiparesis.

2. History of chronic constipation with recurrent fecal impaction.

3. Hypertension.

4. Hyperlipidemia.

5. GERD.

6. Hyponatremia, resolved.

7. Sinus tachycardia versus SVT, resolved.



DISCHARGE MEDICATIONS:  

1. Vitamin D3 5000 units p.o. at bedtime.

2. Ferrous sulfate 325 mg p.o. at bedtime. 

3. Probiotic one capsule p.o. at bedtime. 

4. Sertraline 50 mg one tab p.o. at bedtime.

5. Pravastatin 20 mg one tab p.o. at bedtime. 

6. Protonix 40 mg one tablet p.o. at bedtime.

7. Bactrim 1 tab p.o. b.i.d. for 10 days. 

8. Meropenem 1 g IV three times a day for 5 days.

9. Metoprolol 50 mg daily. 



Discontinued medications:  None.



HISTORY OF PRESENT ILLNESS AND HOSPITAL COURSE:  This patient is a 75-year-old

female presenting to the emergency department complaining of bilateral flank 
pain

that was worse on her left side.  The patient's daughter denied any fever or 
chills.

 She was A and O x2 at time of admission.  Upon admission WBC was 34.2 which 
corrected to 6.2 prior to discharge. The patient was found to be bacteremic with
blood cultures growing E coli and urine

culture growing Proteus.  The patient was started on Bactrim and meropenem, to

complete a 7-day course of meropenem, final date being December 3, 2020, and a

14-day course of Bactrim.  During hospital admission Dr. Vegas with Urology was

consulted and patient underwent bilateral ureteral stent placement on November 24th,

which markedly improved the patient clinically.  The patient will need 
outpatient

followup with Dr. Vegas one week from discharge.  The patient is discharged to

[facility] for continued PT and OT as she lives with her daughter who could use 
some

help taking care of her. 



DISPOSITION:  Stable.



DISCHARGE INSTRUCTIONS:  

1. Location: [facility].

2. Diet:  Heart healthy, extra gravy puree.

3. Activity with assistance.

4. Follow up with Dr. Vegas one week from discharge.







Job ID:  [id number]



[facility]

## 2020-12-01 NOTE — HP
CHIEF COMPLAINT:  

1. E. coli bacteremia secondary to bilateral obstructed ureter with nephrolithiasis

and hydronephrosis. 

2. Proteus urinary tract infection. 

She also has a history of a distant closed-head injury with bedridden status since

that time, but being cared for greatly and well by her daughter, but with recurrent

problems of recurrent urinary tract infections with infected renal and ureteral

calculus and subsequent urosepsis.  She is being followed closely by her urologist,

Dr. Dileep Vegas.  Multiple cystoscopies and lithotripsies most recently in

January of this year, but had a stone retrieval in July of this year.  She presented

this time with abdominal pain and was found in the emergency room to have obstructed

kidneys and hydronephrosis.  She quickly became septic with tachycardia and fever

and hypotension requiring pressors with Levophed.  However, soon had bilateral

ureteral stents done with cultures of the urine showing Proteus, but blood culture

returned E. coli.  She was initially responding to meropenem, but then when

attempted to be switched only to oral antibiotics, became febrile again and

therefore has been started on full 10-day course of meropenem to continue until

December 3rd.  She is stable at this time.  Alert and oriented, at her baseline

mental status.  Eating well.  No respiratory distress.  She is felt to be stable to

continue her antibiotics in the [facility]. 



PAST MEDICAL HISTORY:  Positive as mentioned above for closed head injury many years

ago, recurrent urinary tract infections with sepsis secondary to recurrent

nephrolithiasis, chronic right-sided hemiparesis secondary to closed-head injury in

2014, hypertension, hyperlipidemia, COPD, and a distant history of myocardial

infarction. 



PAST SURGICAL HISTORY:  Positive for hysterectomy; tracheotomy feeding tube, all

removed; oni hole for the subdural hematoma with traumatic brain injury. 



SOCIAL HISTORY:  She lives with her daughter, has a 24-hour caretaker.



REVIEW OF SYSTEMS:  Obtained from the daughter.  States that she was doing well

until she had the acute onset of pain with no fever, chills until she presented to

the emergency room that day.  She has been eating well.  Has a problem with

constipation in the past, been well controlled.  She has had no decubitus and no

problems nausea, vomiting, chest pain, or shortness of breath. 



PHYSICAL EXAMINATION:

VITAL SIGNS:  At this time showed to have blood pressure of 146/63, temperature is

98, pulse 66, respirations 16, O2 sats 94% on room air. 

HEENT:  Pupils are equal, round, and reactive to light and accommodation.  Sclerae

anicteric.  Conjunctivae pale.  Oral mucous membranes well hydrated. 

NECK:  Supple.  No nodes or masses.  JVP is not elevated. 

LUNGS:  Clear. 

CARDIAC EXAMINATION:  Shows regular rhythm. 

ABDOMEN:  Soft and nontender. 

SKIN AND EXTREMITIES:  Show no decubitus, no clubbing or cyanosis. 

NEUROLOGICAL:  Shows right hemiparesis.  Slow, but appropriate responses to

questions. 



LABORATORY DATA:  Today shows white count of 6200, hematocrit 33, hemoglobin 11.

Sodium 139, potassium 3.7, chloride 107, bicarb 27, BUN 8, creatinine 0.57, albumin

2.4.  Urinalysis shows pyuria and bacteriuria.  COVID is negative.  Urine culture

shows as mentioned above, Proteus and E. coli.  Blood culture only growing E. coli,

which is sensitive to meropenem and Bactrim, but which appears to not respond only

to the oral Bactrim with recurrent fever. 



ASSESSMENT:  

1. Recurrent urinary tract infections secondary to recurrent infected kidney stones,

status post bilateral ureteroscopy and stent placement by Dr. Vegas on November 24th.  Subsequent defervesce of her sepsis syndrome on IV meropenem, but with

recurrence when attempted to discontinue and therefore will be on IV meropenem for

10 days until December 3rd. 

2. Traumatic brain injury, stable right hemiparesis.

3. Hypertension.

4. Hyperlipidemia.

5. Chronic constipation.



PLAN:  

1. Continue IV meropenem 1 g q.8 hours until December 3rd.

2. Continue Bactrim DS for same period of time.

3. Follow up with Dr. Vegas in 1 week after discharge.

4. Monitor for recurrent sepsis and vital signs.

5. Monitor for aspiration.

6. Continue DVT and stress ulcer prophylaxis.







Job ID:  [id number]

## 2020-12-02 RX ADMIN — Medication SCH UNITS: at 20:14

## 2020-12-02 RX ADMIN — Medication SCH ML: at 20:15

## 2020-12-02 RX ADMIN — Medication SCH ML: at 08:56

## 2020-12-02 RX ADMIN — PANTOPRAZOLE SODIUM SCH MG: 40 GRANULE, DELAYED RELEASE ORAL at 20:15

## 2020-12-02 RX ADMIN — SULFAMETHOXAZOLE AND TRIMETHOPRIM SCH TAB: 800; 160 TABLET ORAL at 08:56

## 2020-12-02 RX ADMIN — SULFAMETHOXAZOLE AND TRIMETHOPRIM SCH TAB: 800; 160 TABLET ORAL at 20:15

## 2020-12-02 RX ADMIN — LACTOBACILLUS ACIDOPH-L.BULGARICUS 1 MILLION CELL CHEWABLE TABLET SCH TAB: at 20:15

## 2020-12-02 NOTE — PRG
DATE OF SERVICE:  12/01/2020



SUBJECTIVE:  The patient is an unfortunate, 75-year-old white female, status post

traumatic brain injury years ago with right hemiparesis, who is bedridden, who also

has recurrent bilateral nephrolithiasis with recurrent urinary tract infections and

sepsis and has had an episode of E coli bacteremia and bilateral hydronephrosis

requiring ureteral stents, being placed by Dr. Vegas.  She has defervesced on IV

meropenem and is to finish her IV meropenem on December 3rd and is doing well in the

skilled unit at this time.  The patient is alert, but confused and in no distress.

She is eating well with no complaints of pain or nausea. 



OBJECTIVE:  VITAL SIGNS:  Temperature 97.2, pulse 70, respirations 18, O2

saturations 95% on room air, blood pressure is 114/53. 

LUNGS:  Clear. 

CARDIAC:  Shows regular rhythm. 

ABDOMEN:  Soft and nontender.  There are no decubitus.



LABORATORY DATA:  White count 6600, hematocrit 37, hemoglobin 12.  Sodium 140,

potassium 4.1, chloride 107, bicarb 27, BUN 8, creatinine 0.69, albumin 2.8. 



ASSESSMENT:  Resolving E coli bacteremia secondary to bilateral nephrolithiasis, who

is status post bilateral ureteral stents and is finishing her course of IV

meropenem. 



PLAN:  

1. Continue IV meropenem until final date of December 3rd.

2. Discuss follow up with Dr. Vegas after discharge.

3. Continue also oral Bactrim until December 8th.







Job ID:  [id number]

## 2020-12-03 VITALS — SYSTOLIC BLOOD PRESSURE: 130 MMHG | DIASTOLIC BLOOD PRESSURE: 64 MMHG | TEMPERATURE: 97.2 F

## 2020-12-03 LAB
BACTERIA UR QL AUTO: (no result) HPF
RBC UR QL AUTO: (no result) HPF (ref 0–3)
SP GR UR STRIP: 1.02 (ref 1–1.03)
WBC UR QL AUTO: (no result) HPF (ref 0–3)

## 2020-12-03 RX ADMIN — SULFAMETHOXAZOLE AND TRIMETHOPRIM SCH TAB: 800; 160 TABLET ORAL at 09:06

## 2020-12-03 RX ADMIN — Medication SCH ML: at 09:06

## 2020-12-03 NOTE — DIS
DATE OF ADMISSION:  11/30/2020



DATE OF DISCHARGE:  12/03/2020



FINAL DIAGNOSES:  

1. Escherichia coli bacteremia, resolved, status post meropenem, IV antibiotics for

10 days. 

2. Bilateral ureterolithiasis, status post bilateral stents by Dr. Dileep Vegas

with followup in 1 week. 

3. Recurrent urinary tract infection and recurrent nephrolithiasis, stable.

4. Chronic right-sided hemiparesis secondary to closed head injury in 2014.

5. Hypertension, controlled to goal on no medication and we will follow.

6. Chronic obstructive pulmonary disease, asymptomatic.



HOSPITAL COURSE:  The patient is an unfortunate 75-year-old female with a history of

a closed head injury in 2014 with subsequent right-sided hemiparesis, some

confusion, who has been cared for at home by her daughter for many years with main

problem of recurrent urinary tract infection and urosepsis secondary to recurrent

nephrolithiasis.  She has been followed by Dr. Dileep Vegas for years with

multiple lithotripsies and stone retrievals.  She was admitted again to the hospital

at [facility] last week with urosepsis, found to have bilateral ureterolithiasis,

hydronephrosis requiring stents, and initiation of treatment with meropenem.  She

was attempted to be changed to oral antibiotics for E. coli bacteremia, but quickly

became febrile again and therefore was felt to require 10 days of IV meropenem and

was transferred here on November 30 to finish the course.  During her stay here, she

has remained afebrile, in no distress, eating well with no nausea and vomiting, and

has finished her course of IV meropenem today and will be discharged home this

afternoon.  She will be cared for at home by her daughter and will have home health

requested at their request.  She will also have follow up with Dr. Vegas for

treatment of her ureteral stents in one week. 



DISCHARGE MEDICATIONS:  Include:

1. Sertraline.

2. Bactrim DS to finish a full 10-day course until December 8.

3. Probiotics.

4. Pantoprazole.







Job ID:  [id number]

## 2020-12-03 NOTE — PRG
DATE OF SERVICE:  12/02/2020



SUBJECTIVE:  Patient is awake and alert, confused, but at baseline mental status,

cheerful, and in no distress.  Daughter is in the room and discussed that her

antibiotics will be finished tomorrow or tonight and that she can be discharged home

tomorrow and she is agreeable. 



OBJECTIVE:  VITAL SIGNS:  Temperature is 97.5, pulse 74, respirations 18, O2 sats

94% on room air, blood pressure is 122/65. 

LUNGS:  Clear. 

CARDIAC:  Regular rhythm. 

ABDOMEN:  Soft and nontender.



ASSESSMENT:  

1. Resolving Escherichia coli bacteremia, to finish IV meropenem 10-day course

tonight. 

2. Recurrent nephrolithiasis with bilateral stents in place.  To follow up with Dr. Vegas as an outpatient. 

3. 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



PLAN:  Finish antibiotics today.  Discharge tomorrow.  Follow up with Dr. Vegas at

his discretion. 







Job ID:  [id number]

## 2020-12-04 NOTE — EKG
Test Reason : STAT

Blood Pressure : ***/*** mmHG

Vent. Rate : 134 BPM     Atrial Rate : 134 BPM

   P-R Int : 190 ms          QRS Dur : 096 ms

    QT Int : 342 ms       P-R-T Axes : 033 033 066 degrees

   QTc Int : 510 ms

 

Sinus tachycardia

Anterolateral infarct , age undetermined

T wave abnormality, consider inferior ischemia

Abnormal ECG

No previous ECGs available

Confirmed by RALPH TELLO (2) on 12/4/2020 12:17:22 PM

 

Referred By:             Confirmed By:RALPH TELLO

## 2020-12-05 NOTE — EKG
Test Reason : 

Blood Pressure : ***/*** mmHG

Vent. Rate : 099 BPM     Atrial Rate : 099 BPM

   P-R Int : 196 ms          QRS Dur : 086 ms

    QT Int : 380 ms       P-R-T Axes : 051 -47 087 degrees

   QTc Int : 487 ms

 

Sinus rhythm with Premature atrial complexes with Abberant conduction

Left axis deviation

Minimal voltage criteria for LVH, may be normal variant

Inferior infarct , age undetermined

Anterolateral infarct , age undetermined

Abnormal ECG

 

Confirmed by ADRIANNE ROJAS ([id number]),  DEBBIE DA SILVA ([id number]) on 12/5/2020 3:17:49 PM

 

Referred By:             Confirmed By:ADRIANNE PRICE

## 2020-12-09 ENCOUNTER — HOSPITAL ENCOUNTER (OUTPATIENT)
Dept: HOSPITAL 92 - SDC | Age: 75
Discharge: HOME | End: 2020-12-09
Attending: UROLOGY
Payer: MEDICARE

## 2020-12-09 VITALS — BODY MASS INDEX: 32.3 KG/M2

## 2020-12-09 DIAGNOSIS — Z79.899: ICD-10-CM

## 2020-12-09 DIAGNOSIS — N20.2: Primary | ICD-10-CM

## 2020-12-09 DIAGNOSIS — A41.9: ICD-10-CM

## 2020-12-09 DIAGNOSIS — Z79.2: ICD-10-CM

## 2020-12-09 DIAGNOSIS — Z20.828: ICD-10-CM

## 2020-12-09 LAB
ANION GAP SERPL CALC-SCNC: 12 MMOL/L (ref 10–20)
APTT PPP: 33 SEC (ref 22.9–36.1)
BASOPHILS # BLD AUTO: 0.1 THOU/UL (ref 0–0.2)
BASOPHILS NFR BLD AUTO: 0.8 % (ref 0–1)
BUN SERPL-MCNC: 19 MG/DL (ref 9.8–20.1)
CALCIUM SERPL-MCNC: 8.3 MG/DL (ref 7.8–10.44)
CHLORIDE SERPL-SCNC: 106 MMOL/L (ref 98–107)
CLOSURE TME COLL+EPINEP BLD: 120 SEC (ref 67–199)
CO2 SERPL-SCNC: 26 MMOL/L (ref 23–31)
CREAT CL PREDICTED SERPL C-G-VRATE: 87 ML/MIN (ref 70–130)
EOSINOPHIL # BLD AUTO: 0.1 THOU/UL (ref 0–0.7)
EOSINOPHIL NFR BLD AUTO: 1.8 % (ref 0–10)
GLUCOSE SERPL-MCNC: 106 MG/DL (ref 83–110)
HGB BLD-MCNC: 12.1 G/DL (ref 12–16)
INR PPP: 1
LYMPHOCYTES # BLD: 2 THOU/UL (ref 1.2–3.4)
LYMPHOCYTES NFR BLD AUTO: 25.6 % (ref 21–51)
MCH RBC QN AUTO: 30.3 PG (ref 27–31)
MCV RBC AUTO: 98.3 FL (ref 78–98)
MONOCYTES # BLD AUTO: 0.7 THOU/UL (ref 0.11–0.59)
MONOCYTES NFR BLD AUTO: 9.2 % (ref 0–10)
NEUTROPHILS # BLD AUTO: 5 THOU/UL (ref 1.4–6.5)
NEUTROPHILS NFR BLD AUTO: 62.6 % (ref 42–75)
PLATELET # BLD AUTO: 399 THOU/UL (ref 130–400)
PLATELET # BLD: 399 THOU/UL (ref 130–400)
POTASSIUM SERPL-SCNC: 4 MMOL/L (ref 3.5–5.1)
PROTHROMBIN TIME: 13.1 SEC (ref 12–14.7)
RBC # BLD AUTO: 4.01 MILL/UL (ref 4.2–5.4)
SODIUM SERPL-SCNC: 140 MMOL/L (ref 136–145)
WBC # BLD AUTO: 8 THOU/UL (ref 4.8–10.8)

## 2020-12-09 PROCEDURE — 85610 PROTHROMBIN TIME: CPT

## 2020-12-09 PROCEDURE — 02HV33Z INSERTION OF INFUSION DEVICE INTO SUPERIOR VENA CAVA, PERCUTANEOUS APPROACH: ICD-10-PCS | Performed by: UROLOGY

## 2020-12-09 PROCEDURE — 85025 COMPLETE CBC W/AUTO DIFF WBC: CPT

## 2020-12-09 PROCEDURE — 36415 COLL VENOUS BLD VENIPUNCTURE: CPT

## 2020-12-09 PROCEDURE — U0002 COVID-19 LAB TEST NON-CDC: HCPCS

## 2020-12-09 PROCEDURE — 85576 BLOOD PLATELET AGGREGATION: CPT

## 2020-12-09 PROCEDURE — 52332 CYSTOSCOPY AND TREATMENT: CPT

## 2020-12-09 PROCEDURE — 0TF6XZZ FRAGMENTATION IN RIGHT URETER, EXTERNAL APPROACH: ICD-10-PCS | Performed by: UROLOGY

## 2020-12-09 PROCEDURE — 0T788DZ DILATION OF BILATERAL URETERS WITH INTRALUMINAL DEVICE, VIA NATURAL OR ARTIFICIAL OPENING ENDOSCOPIC: ICD-10-PCS | Performed by: UROLOGY

## 2020-12-09 PROCEDURE — 36569 INSJ PICC 5 YR+ W/O IMAGING: CPT

## 2020-12-09 PROCEDURE — 0TF7XZZ FRAGMENTATION IN LEFT URETER, EXTERNAL APPROACH: ICD-10-PCS | Performed by: UROLOGY

## 2020-12-09 PROCEDURE — 0TP98DZ REMOVAL OF INTRALUMINAL DEVICE FROM URETER, VIA NATURAL OR ARTIFICIAL OPENING ENDOSCOPIC: ICD-10-PCS | Performed by: UROLOGY

## 2020-12-09 PROCEDURE — 71045 X-RAY EXAM CHEST 1 VIEW: CPT

## 2020-12-09 PROCEDURE — 80048 BASIC METABOLIC PNL TOTAL CA: CPT

## 2020-12-09 PROCEDURE — 85730 THROMBOPLASTIN TIME PARTIAL: CPT

## 2020-12-09 PROCEDURE — 50590 FRAGMENTING OF KIDNEY STONE: CPT

## 2020-12-09 PROCEDURE — 74018 RADEX ABDOMEN 1 VIEW: CPT

## 2020-12-09 NOTE — RAD
SINGLE VIEW OF THE ABDOMEN:

 

COMPARISON: 

8/14/2019.  CT abdomen/pelvis 11/23/2020.

 

HISTORY: 

Cholelithiasis.

 

FINDINGS: 

Anterior views of the abdomen show a nonspecific, nonobstructed bowel gas pattern.  There are bilater
al ureteral stents which appear to be in good position.  Calcifications are seen projecting over the 
right renal shadow measuring up to 1.9 cm in size.  No obvious calcifications project over the left r
enal shadow.  No calcifications are seen along the course of the stents. 

 

Degenerative changes are seen in the spine.  An inferior vena cava filter is seen.  Degenerative hector
ges are seen in the hips.

 

IMPRESSION: 

1.  Right nephrolithiasis.

 

2.  Bilateral ureteral stents appear in good position.

 

POS: EAA

## 2020-12-09 NOTE — RAD
Frontal radiograph chest:

12/9/2020



COMPARISON: 1/10/2020



HISTORY: Evaluate chest following central line placement



FINDINGS: Right-sided vascular catheter present with distal tip overlying the region of the cavoatria
l junction. No pneumothorax is evident. No lobar consolidation. Mild linear density in the medial

left base may signify infiltrate or volume loss.



IMPRESSION: Right vascular catheter. Minimal linear density in the medial left base.



Reported By: Elieser Griffiths 

Electronically Signed:  12/9/2020 8:21 AM

## 2020-12-09 NOTE — SPC
Left upper extremity PICC placement sonographic guided



HISTORY: Sepsis.



FINDINGS: After explaining the procedure and answering all questions, left upper extremity was preppe
d and draped in usual sterile fashion.



Sterile technique, buffered local anesthesia, sonographic guidance, and a 22-gauge needle were used t
o carefully access the left brachial vein.



Standard technique was used to place the tip of a 5 Arabic single lumen PICC so that the tip lies at 
the level of the superior vena cava.



Catheter was flushed and secured externally. Patient tolerated the procedure well and was dismissed i
n unchanged condition.



Fluoroscopy time 0 seconds.



  



IMPRESSION :

Left upper extremity PICC is ready for use.



Reported By: MARCUS Koehler 

Electronically Signed:  12/9/2020 3:36 PM

## 2020-12-09 NOTE — OP
DATE OF PROCEDURE:  12/09/2020



PREOPERATIVE DIAGNOSES:  Bilateral ureteral stones, bilateral ureteral stents.



POSTOPERATIVE DIAGNOSES:  Bilateral ureteral stones, bilateral ureteral stents.



PROCEDURES PERFORMED:  Bilateral extracorporeal shock wave lithotripsy, cysto,

bilateral stent exchange and bilateral retrograde. 



ANESTHETIC:  General.



ESTIMATED BLOOD LOSS:  Not recorded.



FINDINGS:  She had a large right proximal ureteral stone, moderate-sized left

ureteral stone which may actually be two stones that are adjacent to each other.

The right stone was treated posteriorly.  The left stone because it was over the

sacrum, it was treated anteriorly.  3000 shocks were used on both sides.  The kV

levels were 4 to 5 on both sides.  Both sides did appear to fragment.  Stents placed

bilaterally 7-Belarusian x 24 cm.  No strings attached. 



DESCRIPTION OF PROCEDURE:  Obtained written and verbal consent from the family.

After receiving IV antibiotics, she was taken to the operating suite.  She was

placed in the supine position on the treatment table.  She was given a general

anesthetic and oral obturator intubation.  Her right flank was coupled to the

lithotripsy unit.  The stone was placed in treatment focal point.  Shockwave therapy

was commenced.  A pause was given after couple of 100 shocks and then the kV level

was gone up to 4 and then __________ 4 to 5 for a total of 3000 shocks.  She was

then repositioned to treat the left side done in a similar manner, although we

treated from the anterior approach.  Could not see the more proximal of the ureteral

stones, just the distal.  After this was done, she was placed in the dorsal

lithotomy position, sterilely prepped and draped.  Cystoscopy was performed.  There

was bloody urine abuts the stone fragments in the bladder.  This was washed out.

The right stent was removed with a pair of grasping forceps through the urethral

meatus and a guidewire was fed up this into the region of the renal pelvis.  The

stent was removed over the guidewire.  A Pollack catheter was advanced over the

guidewire and the guidewire was removed.  We filled out this collecting system down

to the level of the proximal stone which had appeared to change.  The guidewires

were placed.  The stent was placed over the guidewire, pushed up in place with aid

of a pusher, so its proximal end coiled in the upper pole amrit and its distal end

coiled in the bladder.  The left side was done in a similar manner.  The bladder was

drained.  The instruments removed.  She was taken out of dorsal lithotomy position,

awakened, extubated, and taken by stretcher to recovery room. 







Job ID:  [id number]

## 2020-12-09 NOTE — PQF
CLINICAL DOCUMENTATION CLARIFICATION FORM: 





Dear : Mekhi Bradley                            Date / Time: 12/9/2020

Please exercise your independent, professional judgment in responding to the 
clarification form. 

Clinical indicators are provided on the bottom of this form for your review





Please check appropriate box(es):

[ x ] Sepsis due to nephrolithiasis

[  ] Sepsis due to other (please specify if any _____________

[  ] Bacteremia

[  ] Other diagnosis ___________

[  ] Unable to determine

In addition, please specify:

Present on Admission (POA):  [ x ] Yes             [  ] No             [  ] 
Unable to determine











To be completed by CDI/Coding staff for physician review: 







 Present Clinical Indicators - Signs / Symptoms / Labs Results and Location in 

Medical Record

 

[ x ] Chief complaint:  E.coli bacteremia secondary to bilateral obstructed 
ureter with nephrolithiasis and hydronephrosis H and P

 

[ x ] Found in the ED to have obstructed kidney and hydronephrosis and quickly 
became septic with tachycardia and fever and hypotension requiring pressor with 
Levophed.    H and P

 

[ x ] Resolving E.coli bacteremia secondary to bilateral nephrolithiasis, status
post bilateral ureteral stents and finishing her course of IV meropenem  
Progress 12/1 by Mekhi Cisneros

 

[ x ] Patient with main problem of recurrent UTI and urosepsis secondary to 
recurrent nephrolithiasis Discharge summary

 

Present Risk Factors                                                            
             Results and Location in Medical Record

 

[ x ] Recurrent urinary tract infection, nephrolithiasis and ureterolithiasis, 
status post stents, E.coli bacteremia   Discharge summary

 

Present Treatments                                                              
               Results and Location in Medical Record

 

[ x ] IV Meropenem 1130-12/04 Medications

 

[ x ] Daily CBCs Laboratory

 

[ x ] IV fluids Medications





CDS/ Signature: [id number]                  Phone #: _____________        
Date/Time: 12/9/2020







                 This is a permanent part of the Medical Record

[facility]

## 2020-12-28 ENCOUNTER — HOSPITAL ENCOUNTER (INPATIENT)
Dept: HOSPITAL 92 - SDC | Age: 75
LOS: 4 days | Discharge: NURSING, REHAB OR LONG-TERM CARE | DRG: 856 | End: 2021-01-01
Attending: UROLOGY | Admitting: UROLOGY
Payer: MEDICARE

## 2020-12-28 VITALS — BODY MASS INDEX: 31.5 KG/M2

## 2020-12-28 DIAGNOSIS — E78.5: ICD-10-CM

## 2020-12-28 DIAGNOSIS — I47.1: ICD-10-CM

## 2020-12-28 DIAGNOSIS — B96.4: ICD-10-CM

## 2020-12-28 DIAGNOSIS — K59.09: ICD-10-CM

## 2020-12-28 DIAGNOSIS — I25.10: ICD-10-CM

## 2020-12-28 DIAGNOSIS — I10: ICD-10-CM

## 2020-12-28 DIAGNOSIS — Z90.710: ICD-10-CM

## 2020-12-28 DIAGNOSIS — G81.91: ICD-10-CM

## 2020-12-28 DIAGNOSIS — Z20.822: ICD-10-CM

## 2020-12-28 DIAGNOSIS — J44.9: ICD-10-CM

## 2020-12-28 DIAGNOSIS — T81.12XA: ICD-10-CM

## 2020-12-28 DIAGNOSIS — R41.3: ICD-10-CM

## 2020-12-28 DIAGNOSIS — Z16.12: ICD-10-CM

## 2020-12-28 DIAGNOSIS — A41.50: ICD-10-CM

## 2020-12-28 DIAGNOSIS — K21.9: ICD-10-CM

## 2020-12-28 DIAGNOSIS — T81.44XA: Primary | ICD-10-CM

## 2020-12-28 DIAGNOSIS — Z93.0: ICD-10-CM

## 2020-12-28 DIAGNOSIS — Z79.899: ICD-10-CM
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
## 2020-12-28 DIAGNOSIS — N20.1: ICD-10-CM

## 2020-12-28 DIAGNOSIS — S06.9X0S: ICD-10-CM

## 2020-12-28 DIAGNOSIS — I25.2: ICD-10-CM

## 2020-12-28 DIAGNOSIS — N39.0: ICD-10-CM

## 2020-12-28 LAB
ALBUMIN SERPL BCG-MCNC: 3.4 G/DL (ref 3.4–4.8)
ALP SERPL-CCNC: 142 U/L (ref 40–110)
ALT SERPL W P-5'-P-CCNC: 8 U/L (ref 8–55)
ANION GAP SERPL CALC-SCNC: 14 MMOL/L (ref 10–20)
ANION GAP SERPL CALC-SCNC: 22 MMOL/L (ref 10–20)
APTT PPP: 30.5 SEC (ref 22.9–36.1)
AST SERPL-CCNC: 14 U/L (ref 5–34)
BASOPHILS # BLD AUTO: 0 THOU/UL (ref 0–0.2)
BASOPHILS NFR BLD AUTO: 0 % (ref 0–1)
BILIRUB SERPL-MCNC: 0.9 MG/DL (ref 0.2–1.2)
BUN SERPL-MCNC: 22 MG/DL (ref 9.8–20.1)
BUN SERPL-MCNC: 22 MG/DL (ref 9.8–20.1)
CALCIUM SERPL-MCNC: 8.2 MG/DL (ref 7.8–10.44)
CALCIUM SERPL-MCNC: 8.7 MG/DL (ref 7.8–10.44)
CHLORIDE SERPL-SCNC: 104 MMOL/L (ref 98–107)
CHLORIDE SERPL-SCNC: 107 MMOL/L (ref 98–107)
CK MB SERPL-MCNC: 1.7 NG/ML (ref 0–6.6)
CO2 SERPL-SCNC: 16 MMOL/L (ref 23–31)
CO2 SERPL-SCNC: 26 MMOL/L (ref 23–31)
CREAT CL PREDICTED SERPL C-G-VRATE: 70 ML/MIN (ref 70–130)
CREAT CL PREDICTED SERPL C-G-VRATE: 74 ML/MIN (ref 70–130)
EOSINOPHIL # BLD AUTO: 0 THOU/UL (ref 0–0.7)
EOSINOPHIL NFR BLD AUTO: 0.4 % (ref 0–10)
GLOBULIN SER CALC-MCNC: 4.3 G/DL (ref 2.4–3.5)
GLUCOSE SERPL-MCNC: 105 MG/DL (ref 83–110)
GLUCOSE SERPL-MCNC: 176 MG/DL (ref 83–110)
HGB BLD-MCNC: 12.7 G/DL (ref 12–16)
HGB BLD-MCNC: 13.2 G/DL (ref 12–16)
INR PPP: 0.9
LYMPHOCYTES # BLD: 0.6 THOU/UL (ref 1.2–3.4)
LYMPHOCYTES NFR BLD AUTO: 9.7 % (ref 21–51)
MAGNESIUM SERPL-MCNC: 2 MG/DL (ref 1.6–2.6)
MCH RBC QN AUTO: 29.4 PG (ref 27–31)
MCH RBC QN AUTO: 30.4 PG (ref 27–31)
MCV RBC AUTO: 95.9 FL (ref 78–98)
MCV RBC AUTO: 96.1 FL (ref 78–98)
MONOCYTES # BLD AUTO: 0.1 THOU/UL (ref 0.11–0.59)
MONOCYTES NFR BLD AUTO: 1.6 % (ref 0–10)
NEUTROPHILS # BLD AUTO: 5 THOU/UL (ref 1.4–6.5)
NEUTROPHILS NFR BLD AUTO: 88.3 % (ref 42–75)
PLATELET # BLD AUTO: 336 THOU/UL (ref 130–400)
PLATELET # BLD AUTO: 340 THOU/UL (ref 130–400)
POTASSIUM SERPL-SCNC: 4.3 MMOL/L (ref 3.5–5.1)
POTASSIUM SERPL-SCNC: 4.6 MMOL/L (ref 3.5–5.1)
PROTHROMBIN TIME: 12.7 SEC (ref 12–14.7)
RBC # BLD AUTO: 4.16 MILL/UL (ref 4.2–5.4)
RBC # BLD AUTO: 4.5 MILL/UL (ref 4.2–5.4)
SODIUM SERPL-SCNC: 139 MMOL/L (ref 136–145)
SODIUM SERPL-SCNC: 141 MMOL/L (ref 136–145)
WBC # BLD AUTO: 5.6 THOU/UL (ref 4.8–10.8)
WBC # BLD AUTO: 8.8 THOU/UL (ref 4.8–10.8)

## 2020-12-28 PROCEDURE — 83605 ASSAY OF LACTIC ACID: CPT

## 2020-12-28 PROCEDURE — 5A2204Z RESTORATION OF CARDIAC RHYTHM, SINGLE: ICD-10-PCS | Performed by: INTERNAL MEDICINE

## 2020-12-28 PROCEDURE — U0002 COVID-19 LAB TEST NON-CDC: HCPCS

## 2020-12-28 PROCEDURE — 3E033XZ INTRODUCTION OF VASOPRESSOR INTO PERIPHERAL VEIN, PERCUTANEOUS APPROACH: ICD-10-PCS | Performed by: UROLOGY

## 2020-12-28 PROCEDURE — 82553 CREATINE MB FRACTION: CPT

## 2020-12-28 PROCEDURE — 93005 ELECTROCARDIOGRAM TRACING: CPT

## 2020-12-28 PROCEDURE — 84100 ASSAY OF PHOSPHORUS: CPT

## 2020-12-28 PROCEDURE — 83735 ASSAY OF MAGNESIUM: CPT

## 2020-12-28 PROCEDURE — 82365 CALCULUS SPECTROSCOPY: CPT

## 2020-12-28 PROCEDURE — 36416 COLLJ CAPILLARY BLOOD SPEC: CPT

## 2020-12-28 PROCEDURE — 85007 BL SMEAR W/DIFF WBC COUNT: CPT

## 2020-12-28 PROCEDURE — 80400 ACTH STIMULATION PANEL: CPT

## 2020-12-28 PROCEDURE — 85025 COMPLETE CBC W/AUTO DIFF WBC: CPT

## 2020-12-28 PROCEDURE — 36415 COLL VENOUS BLD VENIPUNCTURE: CPT

## 2020-12-28 PROCEDURE — 84145 PROCALCITONIN (PCT): CPT

## 2020-12-28 PROCEDURE — 81015 MICROSCOPIC EXAM OF URINE: CPT

## 2020-12-28 PROCEDURE — 0T788DZ DILATION OF BILATERAL URETERS WITH INTRALUMINAL DEVICE, VIA NATURAL OR ARTIFICIAL OPENING ENDOSCOPIC: ICD-10-PCS | Performed by: UROLOGY

## 2020-12-28 PROCEDURE — 74420 UROGRAPHY RTRGR +-KUB: CPT

## 2020-12-28 PROCEDURE — 87149 DNA/RNA DIRECT PROBE: CPT

## 2020-12-28 PROCEDURE — 80053 COMPREHEN METABOLIC PANEL: CPT

## 2020-12-28 PROCEDURE — 87086 URINE CULTURE/COLONY COUNT: CPT

## 2020-12-28 PROCEDURE — 87186 SC STD MICRODIL/AGAR DIL: CPT

## 2020-12-28 PROCEDURE — 84484 ASSAY OF TROPONIN QUANT: CPT

## 2020-12-28 PROCEDURE — 71275 CT ANGIOGRAPHY CHEST: CPT

## 2020-12-28 PROCEDURE — 74018 RADEX ABDOMEN 1 VIEW: CPT

## 2020-12-28 PROCEDURE — 85730 THROMBOPLASTIN TIME PARTIAL: CPT

## 2020-12-28 PROCEDURE — 85027 COMPLETE CBC AUTOMATED: CPT

## 2020-12-28 PROCEDURE — 87040 BLOOD CULTURE FOR BACTERIA: CPT

## 2020-12-28 PROCEDURE — 87077 CULTURE AEROBIC IDENTIFY: CPT

## 2020-12-28 PROCEDURE — 84443 ASSAY THYROID STIM HORMONE: CPT

## 2020-12-28 PROCEDURE — 93010 ELECTROCARDIOGRAM REPORT: CPT

## 2020-12-28 PROCEDURE — 80048 BASIC METABOLIC PNL TOTAL CA: CPT

## 2020-12-28 PROCEDURE — 88300 SURGICAL PATH GROSS: CPT

## 2020-12-28 PROCEDURE — 0TP98DZ REMOVAL OF INTRALUMINAL DEVICE FROM URETER, VIA NATURAL OR ARTIFICIAL OPENING ENDOSCOPIC: ICD-10-PCS | Performed by: UROLOGY

## 2020-12-28 PROCEDURE — 0TC68ZZ EXTIRPATION OF MATTER FROM RIGHT URETER, VIA NATURAL OR ARTIFICIAL OPENING ENDOSCOPIC: ICD-10-PCS | Performed by: UROLOGY

## 2020-12-28 PROCEDURE — 85610 PROTHROMBIN TIME: CPT

## 2020-12-28 RX ADMIN — MEROPENEM AND SODIUM CHLORIDE SCH MLS: 1 INJECTION, SOLUTION INTRAVENOUS at 23:57

## 2020-12-28 NOTE — PDOC.FPRHP
- History of Present Illness


Chief Complaint: CODE GREEN FROM PACU


History of Present Illness: 





Pt is a 74 y/o F who presents from the PACU as she was having bilateral 

lithotripsies with bilateral ureteral stent replacements with Dr. Cheney. After 

the procedure her HR went up to the high 130s. At that time a code green was 

called because of this. Dr. Cortes was called at this time and patient was 

given 3 rounds of adenosine and 1 push of digoxin and 2 rounds of cardioversion 

with no resolution of her tachycardia. At this time the primary team was called 

to admit her to the hospital inpatient service. Pt was not c/o CP, SOB. She had 

no other complaints at this time. Daughter was at bedside and stated that pt was

doing well after coming home from SNF on 12/9/20. 





- Allergies/Adverse Reactions


                                    Allergies











Allergy/AdvReac Type Severity Reaction Status Date / Time


 


No Known Drug Allergies Allergy   Verified 12/22/20 14:20














- Home Medications


                                        











 Medication  Instructions  Recorded  Confirmed  Type


 


Cholecalciferol [Vitamin D3] 5,000 units PO HS 11/06/18 12/22/20 History


 


Ferrous Sulfate 325 mg PO HS 11/06/18 12/22/20 History


 


Lactobacillus Acidophilus 1 capsule PO HS 04/30/19 12/22/20 History





[Probiotic]    


 


Pravastatin Sodium 1 tab PO HS 07/09/20 12/22/20 History


 


Pantoprazole [Protonix] 1 tab PO HS #30 pk 12/03/20 12/22/20 Rx


 


Sertraline HCl [Zoloft] 50 mg PO HS  tab 12/03/20 12/22/20 Rx


 


Sulfamethoxazole/Trimethoprim 1 tab PO DAILY 12/22/20 12/22/20 History





[Bactrim DS]    














- History


PMHx:


-Chronic Recurrent UTIs


-Short term memory loss


-Chronic Constipation 


-Chronic R sided hemiparesis s/o TBI in 2014, bed bound


-HTN


-HLD


-COPD


-Hx of MI


 


PSHx: 


-Hysterectomy


-Tracheotomy/feeding tube s/p removed


-[place] hole for TBI





FHx:


-CAD in parents


 


Social:


-No TAD. Lives with daughter has 24 hour caretaker


 








- Review of Systems


General: denies: fever/chills, weight/appetite/sleep changes, fatigue


ENT: denies: nasal congestion, rhinorrhea


Respiratory: denies: cough, congestion, shortness of breath


Cardiovascular: denies: chest pain, palpitation, edema


Gastrointestinal: denies: nausea, vomiting, diarrhea, constipation, abdominal 

pain


Genitourinary: denies: incontinence, dysuria, polyuria, discharge


Skin: denies: rashes, lesions


Musculoskeletal: denies: pain, tenderness


Neurological: denies: numbness


Psychological: denies: anxiety, depression





- Vital signs


 BP 99/41, ,  RR: 24,  O2 sat 93-94% on RA, afebrile per nurse 





- Physical Exam


Constitutional: NAD, awake, alert and oriented


HEENT: normocephalic and atraumatic, grossly normal vision, grossly normal 

hearing, MMM


Neck: supple


Heart: normal S1/S2, no murmurs/rubs/gallops, other (tachycardic)


Lungs: CTAB, no respiratory distress, no wheezing


Abdomen: soft, non-tender, bowel sounds present, no masses/distention


Skin: good turgor


Heme/Lymphatic: no unusual bruising or bleeding


Psychiatric: normal mood and affect, good judgment and insight, intact recent 

and remote memory





FMR H&P: Results





- Labs


Result Diagrams: 


                                 12/28/20 16:34





                                 12/28/20 16:34


Lab results: 


                                        











WBC  8.8 thou/uL (4.8-10.8)   12/28/20  10:16    


 


Hgb  12.7 g/dL (12.0-16.0)   12/28/20  10:16    


 


Hct  40.0 % (36.0-47.0)   12/28/20  10:16    


 


MCV  96.1 fL (78.0-98.0)   12/28/20  10:16    


 


Plt Count  336 thou/uL (130-400)   12/28/20  10:16    


 


Sodium  139 mmol/L (136-145)   12/28/20  10:16    


 


Potassium  4.6 mmol/L (3.5-5.1)   12/28/20  10:16    


 


Chloride  104 mmol/L ()   12/28/20  10:16    


 


Carbon Dioxide  26 mmol/L (23-31)   12/28/20  10:16    


 


BUN  22 mg/dL (9.8-20.1)  H  12/28/20  10:16    


 


Creatinine  0.91 mg/dL (0.6-1.1)   12/28/20  10:16    


 


Glucose  105 mg/dL ()   12/28/20  10:16    


 


Calcium  8.7 mg/dL (7.8-10.44)   12/28/20  10:16    














FMR H&P: A/P





- Plan








##SVT vs. Sinus Tachycardia


Pt was having procedure with Dr. Cheney of Urology: R and L laser lithotripsy 

and stone retrieval and stone fragments in the R proximal ureter and R upper 

calyceal system and L two ureteral stones with no retrieval of the L sided 

stones. Bilateral stent replacement was done. After the procedure, pt HR went up

to 130s and was given 3 dose of adenosine, 1 push of digoxin, and 2 rounds of 

electrocardioversion, and was started on diltiazem drip. She has not since 

converted. At that time it was decided she needed to be admitted to telemetry. 

Pt's BP has been tolerating diltiazem so far.


-Dr. Cortes of cardiology had seen patient once he had been called to code 

green in the PACU due to pt's HR. Suggested that pt can be placed on amiodarone 

if her BPs don't tolerate the diltiazem.


-BCx and UCx have been ordered


-CBC and CMP ordered, today her WBC was 8.8 prior to procedure, has since 

trended to 5.6


-Apparently patient was ordered/given meropenem and vancomycin for procedure


-Mg and Phos ordered


-Will order troponin and trend as necessary and CTA


-Cardiology saw patient at last admission and stated that she has sinus 

tachycardia due to her urosepsis at that time and had mostly like some probable 

SVT and signed off at that point


-Pt has ECHO 11/25 which showed EF 50-60% with no diastolic dysfunction





Chronic Conditions: 


##Hx of Chronic Constipation 


##Hx of TBI with R sided hemiparesis


##HTN


##HLD


##GERD


##Short term memory loss: aware


-continue home medications





CODE: FULL


DIET: HH


VTE: Lovenox


PCP: Kirk


Contact: Daughter: Anuja





Dispo: admitted from PACU to Telemetry. will monitor on telemetry on diltiazem 

drip. will switch as necessary. 





 





FMR H&P: Upper Level





- Pertinent history





PCP: Kirk





HPI:   75YOF with a PMH notable for multiple episodes of renal stones & UTIs 

requiring lithotripsy & stent placement, Hx TBI w/ residual short term memory 

loss & R-sided deficits, chronic constipation and HTN who we were called to 

admit following a code green in the PACU. Of note, patient was recently admitted

the end of November for urosepsis 2/2 E coli from B/L obstructing ureteral 

stones & had stents placed at that time. She has since had 2 lithotripsy 

treatments with stent replacement, one on 12/9 & the second today with her 

Urologist, Dr. Cheney. History was obtained from Cardiologist, Dr. Cortes and

the PACU staff given the patients Hx short term memory loss. The patient 

underwent bilateral lithotripsy with stent replacement & cystoscopy but 

subsequently went into SVT in the 130s while being monitored in the PACU 

following this procedure. The patient was given 6 of adenosine x3, 0.5 mg IV 

digoxin & unsuccessfully cardioverted x2. Cards then recommended starting a drip

so she was given a diltiazem bolus of 0.25mg/kg & started on a diltiazem gtt @ 

5. The patients daughter was also at bedside and reported that the patient has 

been doing well at home. Denies any fever/chills, diarrhea, or constipation. Has

reported chronic malodorous urine. 





PACU course: See above





- Pertinent findings





Labs/Imaging: Retrograde pyelogram WNLs 


WBC 8.8





REVIEW OF SYSTEMS: 





Limited/unable to obtain 2/2 patients baseline limited mental status





Vitals:  HR: 135  BP: 99/41  RR: 24  O2 sat 94% on RA 





PHYSICAL EXAMINATION: 


General: Sitting up in bed in NAD 


HEENT: normocephalic atraumatic


Neck: Supple. Full ROM. 


Heart/Cardiovascular System: Tachycardic with regular rhythm, no murmurs noted


Lungs/Respiratory System: CTAB


Abdomen/Gastro-Intestinal System: soft w/ no mild suprapubic abdominal 

tenderness, normal bowel sounds


Extremities: Warm extremities. No edema noted


Neuro: CNs grossly intact; R-sided hemiparesis noted


Skin: No rashes noted. 








- Plan


Date/Time: 12/28/20 1721








Padma YANEZ, have evaluated this patient and agree with findings/plan as 

outlined by intern resident. Pertinent changes/additions are listed here.





A/P:





#SVT vs. sinus tachycardia


-Patient started on a diltiazem bolus and diltiazem drip with transfer to the 

telemetry unit for continued close monitoring. Will consider transition to 

amiodarone if BPs become soft as this was given during her last hospital stay 

for conversion. TSH & lyte levels pending as well as blood & urine Cxs. Cards 

evaluated patient in PACU PTA. Appreciate recs. 





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 


-Aware, resume home meds. Frequent reorientation & delirium precautions. 





#HTN: Will resume home meds as tolerated.





#chronic constipation: Will continue a bowel regimen while inpatient.





#Chronic Recurrent UTIs & renal stones: Aware, Dr. Cheney to continue





#HLD: Resume home meds





#Hx of MI: Resume home meds 





#GERD: Home meds





PCP: Kirk


ABx: None


IVFs: LR @ 125mL/hr


VTE PPX: Lovenox


GI PPX: Home PPI


Code status: FULL CODE


Dispo: Will admit to telemetry for continued IV dilt drip & close monitoring of 

heart rate and blood pressures. Anticipated LOS >/= 48 hours pending clinical 

course.  











Addendum - Attending





- Attending Attestation


Date/Time: 12/28/20 2238





I personally evaluated the patient and discussed the management with Dr. Kumar.


I agree with the History, Examination, Assessment and Plan documented above with

any addition or exceptions noted below.


Sinnce admission Mrs. Matute has had intermittent episodes of hypotension, such 

that we have decided to move her to the [facility].


Currently her pulse is down to 105 and she is normotensive.  The nurse has been 

in communication with Dr Zhang about so urinary bleeding and he instructed 

her to palervin li.  A CTA has just been completed as well.

## 2020-12-28 NOTE — OP
DATE OF PROCEDURE:  12/28/2020



PREOPERATIVE DIAGNOSIS:  Bilateral ureteral stones.



POSTOPERATIVE DIAGNOSIS:  Bilateral ureteral stones.



PROCEDURES PERFORMED:  

1. Right rigid and flexible ureteroscopy with laser lithotripsy and stone retrieval

and stone fragments in the right proximal ureter and the right upper calyceal

system. 

2. Left rigid and flexible ureteroscopy with laser lithotripsy of two ureteral

stones.  No stone retrieval on this side. 

3. Bilateral stent replacement.



ANESTHETIC:  General.



ESTIMATED BLOOD LOSS:  Less than 50 mL.



DRAINS:  She had 7 x 24 cm double-J stents placed bilaterally.  Strings were not

left attached. 



DESCRIPTION OF PROCEDURE:  Obtained written and verbal consent from the patient's

daughter, and after receiving IV antibiotics and after documenting normal

preoperative blood work and a negative COVID test, she was taken to the operating

suite.  Antibiotics she received were vanc and meropenem.  She was taken to the OR

cystoscopic room and placed in a supine position on the table.  PlexiPulses were

placed on her lower extremities and turned on.  She was given a general anesthetic,

oral obturator intubation, placed in the dorsal lithotomy position, sterilely

prepped and draped.  Fluoroscopy unit was brought in and positioned over her.

Stents were in good position.  You could see the stone in the right proximal ureter

looked to be a number of fragments.  I could not see any stones along the course of

the left ureter or renal collecting system.  She did have stones up in the right

renal collecting system.  Cystoscopy was performed with a 22-[place] sheath.  This

was well lubricated and passed under direct vision through the female urethra into

the urinary bladder with aid of a 30-degree lens video camera and monitor.  The

bladder was filled a number of times.  The distal end of the double-J stent was

grasped and brought out through the urethral meatus and a guidewire was fed up

through this, but the lumen had occluded, so the stent was removed intact and then a

Pollack catheter was advanced a couple of centimeters up the right ureteral orifice

and contrast was injected in a retrograde manner allowing us to pass a guidewire up

into the renal pelvis.  We then removed the scope, removed the Pollack catheter and

over a green guidewire, we placed a dual-lumen catheter, and once this was 2 to 3 cm

up the right ureter, we passed a stiff blue guidewire through the 2nd port of the

dual-lumen catheter up into the renal pelvis removing the dual-lumen catheter.  At

this point, a ureteral sheath with obturator was lubricated and brought in and

placed over the blue catheter and pushed up to just below the level of the proximal

ureteral stone.  The blue guidewire and the obturator were removed and we brought in

a rigid ureteroscope.  This was placed through our ureteral sheath and we used this

to break up a couple of larger fragments of the remaining proximal ureteral stone

that was treated with ESWL couple of weeks ago.  We then used Nitinol basket to

remove these fragments.  We then came back in with our flexible ureteroscope and

went up through the ureteral sheath and up into the renal pelvis and broke up stones

that were mostly in the right upper pole calyceal system.  Most of these were quite

small, but we did laser some of the slightly larger ones.  We used basket to remove

as many of these as we could.  There were couple of other slightly larger stones

that were seen at this point just in the renal pelvis and may have come out of

either the upper or mid calyces system.  We lasered these and removed these pieces.

We then backed our cells out with our ureteral sheath and found no other evidence of

ureteral stones.  Our green guidewire was backloaded through the cystoscope.  A

Pollack catheter was placed over it.  The green guidewire was removed and contrast

was injected.  There was no extravasation noted.  No persistent filling defect or

obstruction.  The guidewire was replaced and a 7 x 24 double-J stent was passed over

the guidewire, pushed up in place with the pusher, so its proximal end coiled in the

renal pelvis and its distal end coiled in the bladder when the wire was removed.  We

then turned our attention to left side.  The guidewire was grasped and brought out

through the urethral meatus.  We could not feed a guidewire through it, so the stent

was then removed and a Pollack catheter was advanced into left ureteral orifice.

Contrast was injected.  There was an area of obstruction about a third of the way up

the ureter over the point of the sacral ala.  A guidewire was fed through the

Pollack catheter and easily went by this point and the Pollack catheter was removed.

 A dual-lumen ureteral catheter was placed over the guidewire and then a blue stiff

wire was passed adjacent to the this point of obstruction and easily passed up in

the region of renal pelvis.  The dual-lumen ureteral catheter was removed and the

ureteral sheath with obturator was passed over the blue stiff wire up to just below

this region.  The blue wire and the obturator removed.  A rigid ureteroscope was

brought in, passed up through the ureteral sheath through this point and then we

visualized probably 8 mm maybe slightly larger ureteral stone that we lasered.  It

was not in pieces.  I do not believe this one had been seen to be treated with

shockwave.  We treated this by breaking it up and tried to disintegrate into small

pieces, which we were successful to do it.  We did not do any stone retrieval.  We

then advanced our rigid ureteroscope a little more proximal, then we came at the

upper portion of the sacral ala.  We met a number of fragments of stones which I

think were related to the shockwave.  A couple of weeks ago, we broke these up in

smaller pieces.  At this point, we went up with our rigid scope as far as we could

go advancing our ureteral sheath as we went.  We could not get into the renal

pelvis, so we removed the rigid scope and brought in the flexible scope and placed

this through the ureteral sheath and examined every calyceal system in the left

kidney, found no other stone fragments.  We then inspected the ureter under direct

vision with the flexible ureteroscope as the ureteral sheath and the scope were

backed out.  At this point, we backloaded our guidewire through our cystoscopic

sheath, passed the Pollack catheter up over the guidewire into the region of renal

pelvis, removed the guidewire, injected contrast to fill up the entire collecting

system and ureter on this side.  There was no extravasation.  No significant point

of obstruction.  The guidewire was replaced and then a 7-[place]ore[place] x 24 cm double-J

stent was passed over the guidewire and pushed up into place with aid of a pusher,

so its proximal end coiled in the renal pelvis and its distal end coiled in the

bladder when the wire was removed.  The bladder was drained.  The instruments were

removed.  She was taken out of dorsal lithotomy position, awakened, extubated, and

taken by stretcher to the recovery room. 







Job ID:  [id number]

## 2020-12-28 NOTE — RAD
XR IVP Retrograde



History: Ureteroscopy



Comparison: Exam November 24, 2020



Findings: Multiple fluoroscopic images were obtained from the procedure room. Interval exchange of th
e bilateral double-J ureteral stents.



Impression: Fluoroscopy for procedure purposes.



Reported By: Manan Dillon 

Electronically Signed:  12/28/2020 2:07 PM

## 2020-12-28 NOTE — CON
DATE OF CONSULTATION:  



REASON FOR CONSULTATION:  Tachycardia.



PRIMARY CARDIOLOGIST:  Hue Miller MD



HISTORY OF PRESENT ILLNESS:  Ms. Matute is a 75-year-old woman who recently had

__________ removal.  In the postoperative period, she developed sudden tachycardia.

Heart rate initially was 160s.  Then went consistently in the upper 130s.  Blood

pressure was marginal initially.  The patient had no chest pain, pressure, or

shortness of breath.  She was given adenosine x3.  She did block down with no

flutter waves present.  EKG was suggestive of sinus tach versus SVT.  It was

difficult to ascertain.  Cardioversion x2 was performed synchronized with anesthesia

and failed. 



PAST MEDICAL HISTORY:  __________ infection, previous closed head injury,

nephrolithiasis, hysterectomy, hypertension. 



SOCIAL HISTORY:  No current tobacco or alcohol use.



MEDICATIONS:  Include:

1. Vitamin D3.

2. Pravastatin.

3. Iron.

4. Sertraline.

5. Protonix.

6. Lactobacillus.



REVIEW OF SYSTEMS:  10-point review of systems is reviewed as above, otherwise

negative. 



PHYSICAL EXAMINATION:

GENERAL:  The patient is a pleasant woman who is in no acute distress.  The patient

appears her stated age. 

VITAL SIGNS:  Blood pressure 144/80, pulse 140, respirations 20. 

NEUROLOGIC:  The patient is alert and oriented x3 with no focal neurologic deficits. 

HEENT:  Sclerae without icterus.  Mouth has moist mucous membranes with normal

pallor. 

NECK:  No JVD.  Carotid upstroke brisk.  No bruits bilaterally. 

LUNGS:  Clear to auscultation with unlabored respirations. 

BACK:  No scoliosis or kyphosis. 

CARDIAC:  Tachycardic. 

ABDOMEN:  Soft, nontender, nondistended.  No peritoneal signs present.  No

hepatosplenomegaly.  No abnormal striae. 

EXTREMITIES:  2+ femoral and 2+ dorsalis pedis pulses.  No cyanosis, clubbing, or

edema. 

SKIN:  No gross abnormalities.



PERTINENT LABORATORY DATA:  From this morning, hemoglobin 12.7.  Creatinine 0.99.



IMPRESSION:  

1. Supraventricular tachycardia versus sinus tachycardia.

2. After a Code Green had been called and cardioversion was attempted, I did review

the chart.  She has had similar findings in November.  She was given IV fluids,

placed on IV amiodarone and slowly trended down.  This occurred after infection

improved.  Her LVEF on 11/25/2020 also was felt to be within normal limits. 

3. At this point, we recommend close observation.  We will put in IMCU.  We will

place on IV amiodarone and give IV digoxin.  We will monitor closely. 







Job ID:  [id number]

## 2020-12-28 NOTE — CT
CT PULMONARY ANGIOGRAM WITH IV CONTRAST AND 3D MIP RECONSTRUCTIONS:

 

Date:  12/28/2020

 

PROVIDED CLINICAL HISTORY:   

Tachycardia. 

 

FINDINGS:

There is no evidence for central or segmental pulmonary embolus. Vascular calcification including cor
onary calcium is demonstrated. There is bibasilar consolidation that may reflect infiltrate or subseg
mental atelectasis. There is no pleural fluid or pneumothorax apparent. The airway appears patent and
 of normal caliber. There is no evidence for thoracic lymph node enlargement. Gallstones are demonstr
ated. The osseous structures demonstrate no concerning lytic or blastic lesions. Degenerative changes
 are seen involving the spine with conspicuous central canal stenosis at multiple levels involving th
e lower thoracic and upper lumbar spine. 

 

IMPRESSION: 

1.  No evidence for central or segmental pulmonary embolus. 

2.  Bibasilar consolidation may reflect atelectasis and/or pneumonia. 

 

 

POS: [facility]

## 2020-12-29 LAB
ALBUMIN SERPL BCG-MCNC: 2.6 G/DL (ref 3.4–4.8)
ALP SERPL-CCNC: 87 U/L (ref 40–110)
ALT SERPL W P-5'-P-CCNC: 19 U/L (ref 8–55)
ANION GAP SERPL CALC-SCNC: 17 MMOL/L (ref 10–20)
AST SERPL-CCNC: 25 U/L (ref 5–34)
BILIRUB SERPL-MCNC: 0.4 MG/DL (ref 0.2–1.2)
BUN SERPL-MCNC: 21 MG/DL (ref 9.8–20.1)
CALCIUM SERPL-MCNC: 7.5 MG/DL (ref 7.8–10.44)
CHLORIDE SERPL-SCNC: 109 MMOL/L (ref 98–107)
CO2 SERPL-SCNC: 19 MMOL/L (ref 23–31)
CREAT CL PREDICTED SERPL C-G-VRATE: 74 ML/MIN (ref 70–130)
GLOBULIN SER CALC-MCNC: 3.2 G/DL (ref 2.4–3.5)
GLUCOSE SERPL-MCNC: 121 MG/DL (ref 83–110)
HGB BLD-MCNC: 11.7 G/DL (ref 12–16)
MCH RBC QN AUTO: 30.7 PG (ref 27–31)
MCV RBC AUTO: 95.6 FL (ref 78–98)
MDIFF COMPLETE?: YES
PLATELET # BLD AUTO: 199 THOU/UL (ref 130–400)
POTASSIUM SERPL-SCNC: 3.5 MMOL/L (ref 3.5–5.1)
RBC # BLD AUTO: 3.8 MILL/UL (ref 4.2–5.4)
SODIUM SERPL-SCNC: 141 MMOL/L (ref 136–145)
TROPONIN I SERPL DL<=0.01 NG/ML-MCNC: 0.08 NG/ML (ref ?–0.03)
TROPONIN I SERPL DL<=0.01 NG/ML-MCNC: 0.09 NG/ML (ref ?–0.03)
WBC # BLD AUTO: 25.1 THOU/UL (ref 4.8–10.8)

## 2020-12-29 RX ADMIN — LACTOBACILLUS ACIDOPH-L.BULGARICUS 1 MILLION CELL CHEWABLE TABLET SCH TAB: at 00:04

## 2020-12-29 RX ADMIN — Medication SCH UNITS: at 00:03

## 2020-12-29 RX ADMIN — LACTOBACILLUS ACIDOPH-L.BULGARICUS 1 MILLION CELL CHEWABLE TABLET SCH TAB: at 20:19

## 2020-12-29 RX ADMIN — MEROPENEM AND SODIUM CHLORIDE SCH MLS: 1 INJECTION, SOLUTION INTRAVENOUS at 06:08

## 2020-12-29 RX ADMIN — PANTOPRAZOLE SODIUM SCH MG: 40 GRANULE, DELAYED RELEASE ORAL at 00:04

## 2020-12-29 RX ADMIN — MEROPENEM AND SODIUM CHLORIDE SCH MLS: 1 INJECTION, SOLUTION INTRAVENOUS at 15:44

## 2020-12-29 RX ADMIN — Medication SCH UNITS: at 20:15

## 2020-12-29 RX ADMIN — MEROPENEM AND SODIUM CHLORIDE SCH MLS: 1 INJECTION, SOLUTION INTRAVENOUS at 21:07

## 2020-12-29 RX ADMIN — PANTOPRAZOLE SODIUM SCH MG: 40 GRANULE, DELAYED RELEASE ORAL at 20:13

## 2020-12-29 NOTE — PDOC.BPN
- Brief Progress Note




















Reassessed in IMCU, MAPs still <65mmHg after 3L of fluids. Patient asx in room, 

reports feeling well. Has PICC line in place, will start levophed & transfer to 

CCU. They can take her at 0730 this morning when bed opens up.  Relayed this to 

CU nurse and day team. Asked nurse to page us if patient becomes symptomatic 

or BP worsen.

## 2020-12-29 NOTE — CON
DATE OF CONSULTATION:  12/29/2020



HISTORY OF PRESENT ILLNESS:  Juju is a 75-year-old female.  She was seen by me 
when

she was admitted last night with urinary tract sepsis.  She is readmitted to the

critical care unit last night for hypotension and she was tachycardic as well. 



She actually had cardioversion back in postanesthesia care unit.  She 
subsequently

was admitted. 



PAST MEDICAL HISTORY:  Remarkable for:

1. Urinary tract infection.

2. 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

3. History of hypertension.

4. History of COPD.

5. History of coronary artery disease.

6. History of tracheostomy and a PEG.

7. History of a oni hole with a traumatic brain injury.

8. Status post hysterectomy.



FAMILY HISTORY:  Negative for lung disease in early age.



REVIEW OF SYSTEMS:  Otherwise negative.



PHYSICAL EXAMINATION:

GENERAL:  She is very pleasant and cooperative. 

VITAL SIGNS:  Blood pressure 122/78, heart rate is 109, respiratory rates in the

20s, and oximetry is 96. 

HEENT:  Pupils are equal.  Sclerae are anicteric. 

LUNGS:  Clear. 

HEART:  Regular rhythm. 

ABDOMEN:  Soft. 

EXTREMITIES:  No clubbing, cyanosis, or edema.



IMPRESSION:  Hypotension after her lithotripsy.  Blood cultures so far negative.




It would not be unreasonable to check a random cortisol level on her.  We will 
do an

ACTH stimulation test in the morning.  Her Levophed requirements are slowly

decreasing.  Hopefully, she will be off Levophed by tomorrow morning.  At this 
point

in time, she does clinically appear to be septic.  Blood cultures so far are

negative. 



TIME SPENT:  This is a 70-minute consult, 50% of the time was spent on the unit

coordinating care. 







Job ID:  [id number]



[facility]

## 2020-12-29 NOTE — PRG
DATE OF SERVICE:  12/29/2020



Ms. Glasgow heart rate has slowly decreased.  Heart rate is now in the 70s to 80s. 



After reviewing the chart, it appears she had undergone similar findings in her last

admission.  It was felt to be consistent with SVT and was given adenosine.  The

patient has significant increases in heart rate in the postoperative period. 



Unfortunately, the patient has had persistent hypotension.  She has been transferred

to the ICU.  She is currently completely asymptomatic with blood pressure currently

in the 70s.  Levophed has been started.  She is on low-dose metoprolol. 



OBJECTIVE:  VITAL SIGNS:  Blood pressure 118/66, pulse 109, respirations 20. 

LUNGS:  Clear to auscultation. 

HEART:  Regular rate and rhythm. 

ABDOMEN:  Soft, nontender, nondistended. 

EXTREMITIES:  No edema.



PERTINENT LABORATORY DATA:  Hemoglobin 11.7, hematocrit 36.4.



IMPRESSION:  

1. Tachycardia.

2. Hypotension.

3. Status post stent removal.



RECOMMENDATIONS:  Ms. Glasgow heart rate is back within normal range.  The patient is

currently on Levophed for hypotension and will need blood cultures x2.  We will also

recommend checking echo Doppler.  Her last TSH was within normal limits. 







Job ID:  [id number]

## 2020-12-29 NOTE — PDOC.FM
- Subjective


Subjective: 





Patient doing well this morning. Moved to ICU. Denies any complaints including 

chest pain, abdominal pain.





- Objective


Vital Signs & Weight: 


                             Vital Signs (12 hours)











  Temp Pulse Pulse Pulse Pulse Pulse Pulse


 


 12/29/20 04:00  99.3 F  96     


 


 12/29/20 03:54  97.9 F      


 


 12/29/20 03:17       


 


 12/29/20 01:59       


 


 12/29/20 00:15   107 H     


 


 12/29/20 00:00   109 H     


 


 12/28/20 22:13   104 H     


 


 12/28/20 21:57  98.7 F  123 H     


 


 12/28/20 20:35  98.7 F  115 H     


 


 12/28/20 20:23  98.3 F  115 H     


 


 12/28/20 20:00   118 H     


 


 12/28/20 19:05    130 H  124 H  121 H  121 H  134 H














  Resp BP BP BP BP BP BP


 


 12/29/20 04:00  24 H       84/38 L


 


 12/29/20 03:54       


 


 12/29/20 03:17       


 


 12/29/20 01:59       


 


 12/29/20 00:15  18       83/40 L


 


 12/29/20 00:00  18       81/43 L


 


 12/28/20 22:13        92/39 L


 


 12/28/20 21:57  18       105/43 L


 


 12/28/20 20:35  18       97/44 L


 


 12/28/20 20:23  18       93/44 L


 


 12/28/20 20:00        95/46 L


 


 12/28/20 19:05   71/32 L  69/32 L  103/54 L  101/51 L  111/51 L 














  Pulse Ox Pulse Ox


 


 12/29/20 04:00  99 


 


 12/29/20 03:54  


 


 12/29/20 03:17  98 


 


 12/29/20 01:59  97 


 


 12/29/20 00:15  97 


 


 12/29/20 00:00  97 


 


 12/28/20 22:13  


 


 12/28/20 21:57  97 


 


 12/28/20 20:35  95 


 


 12/28/20 20:23  95 


 


 12/28/20 20:00  97 


 


 12/28/20 19:05   93 L








                                     Weight











Weight                         88.592 kg











                            Most Recent Monitor Data











Heart Rate from ECG            95


 


NIBP                           88/36


 


NIBP BP-Mean                   53


 


Respiration from ECG           29


 


SpO2                           100














I&O: 


                                        











 12/27/20 12/28/20 12/29/20





 06:59 06:59 06:59


 


Output Total   300


 


Balance   -300











Result Diagrams: 


                                 12/29/20 02:17





                                 12/29/20 02:17





Phys Exam





- Physical Examination


Constitutional: NAD


HEENT: moist MMs


poor dentition


Neck: supple, full ROM


Respiratory: no wheezing, clear to auscultation bilateral


Cardiovascular: RRR, no significant murmur


Gastrointestinal: soft, no distention


trace BLE edema


A&Ox1


Psychiatric: normal affect


Skin: no rash





Dx/Plan


(1) Sepsis associated hypotension


Code(s): A41.9 - SEPSIS, UNSPECIFIED ORGANISM; I95.9 - HYPOTENSION, UNSPECIFIED 

 Status: Acute   





(2) History of MI (myocardial infarction)


Code(s): I25.2 - OLD MYOCARDIAL INFARCTION   Status: Chronic   





- Plan


Plan: 





#Sepsis likely 2/2 infection s/p lithotripsy


-Patient s/p 2 cardioversions, multiple rounds of digoxin and adenoine. Pulse 

has been low 100s-mid 90s.


-Patient has been hypotensive this morning, BPs 80s/30s, has PICC line in place;

received ~3L LR. Levophed started


-Cardiology consulted yesterday, appreciate recs


- TSH and electrolytes normal. 


- WBC elevated at 25.1, Procal 155, started on meropenem 12/28, +vanc by Dr. Cheney. Will continue pending cultures 


- PICC line does not appear visibly infected


- Blood and urine cx pending





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 


-Aware, resume home meds. Frequent reorientation & delirium precautions. 





#HTN: Will resume home meds as tolerated.





#chronic constipation: Will continue a bowel regimen while inpatient.





#Chronic Recurrent UTIs & renal stones: 


-Aware, Dr. Cheney to continue to follow


-Dr. Zhang consulted overnight and li placed


-urine appears bloody, though nursing reports this has improved 





#HLD: Resume home meds





#Hx of MI: Resume home meds 





#Elevated troponin, resolved


-0.071>0.094>0.077


-likely due to demand from tachycardia


-patient denies chest pain, pulse mid-90s





#GERD: Home meds





PCP: Kirk


VTE PPX: Lovenox


GI PPX: Home PPI


Code status: FULL CODE


Dispo: Admitted to ICU for continued IV levophed through her PICC line for 

pressure support; cardiology consulted, appreciate recs; continue abx for 

suspected infection  





Addendum - Attending





- Attending Attestation


Date/Time: 12/29/20 5304





I personally evaluated the patient and discussed the management with Dr. Ferris. 


I agree with the History, Examination, Assessment and Plan documented above with

any addition or exceptions noted below.





Patient feels improved. Work to wean Levophed. Cardiology on board for 

arrhythmia. Continue abx and suspect will need long term IV abx for her UTI in 

setting of chronic nephrolithiasis. Urology on board.

## 2020-12-29 NOTE — PRG
DATE OF SERVICE:  12/29/2020



She had her bilateral ureteroscopy, laser lithotripsy, and stent replacement

yesterday.  She did good in recovery room, transferred to PACU, developed

tachycardia and she had some blood pressure changes.  Cardiology saw her and she

ultimately ended up being admitted, then actually being transferred to the ICU last

night for low blood pressure.  She was started on the Levophed drip, not a high

dose.  Her heart rate now is around 100.  Her blood pressure has been improving.

She has had very good urine output over 200 mL in last couple of hours.  It has

cleared up.  She received about 3 L of urine yesterday when her blood pressure was

low.  Blood cultures were repeated.  I did also repeat a urine culture.  There is

nothing growing so far.  She has been maintained on meropenem and vancomycin.  Her

white count is elevated at 25,000.  Her creatinine is normal at 0.89.  Her lactic

acid was elevated yesterday evening.  It is much improved this morning.  We will

plan on keeping her on IV antibiotics, see if anything grows out on her blood and

urine cultures.  The decision will be made by how long to treat her with

antibiotics.  I will follow along with you.  This was discussed with the patient's

daughter, who is at the bedside. 







Job ID:  [id number]

## 2020-12-29 NOTE — EKG
Test Reason : TACHY

Blood Pressure : ***/*** mmHG

Vent. Rate : 119 BPM     Atrial Rate : 119 BPM

   P-R Int : 182 ms          QRS Dur : 084 ms

    QT Int : 316 ms       P-R-T Axes : 041 -52 072 degrees

   QTc Int : 444 ms

 

Sinus tachycardia

Left axis deviation

Inferior infarct , age undetermined

Abnormal ECG

 

Confirmed by MARISELA MCGOVERN ([id number]) on 12/29/2020 5:35:03 PM

 

Referred By:             Confirmed By:MARISELA MCGOVERN

## 2020-12-29 NOTE — EKG
Test Reason : 

Blood Pressure : ***/*** mmHG

Vent. Rate : 155 BPM     Atrial Rate : 147 BPM

   P-R Int : 000 ms          QRS Dur : 100 ms

    QT Int : 348 ms       P-R-T Axes : 000 -54 074 degrees

   QTc Int : 559 ms

 

Supraventricular tachycardia

Left axis deviation

Inferior infarct , age undetermined

Abnormal ECG

 

Confirmed by MARISELA MCGOVERN ([id number]) on 12/29/2020 5:34:56 PM

 

Referred By:  SUZY           Confirmed By:MARISELA MCGOVERN

## 2020-12-30 LAB
ANION GAP SERPL CALC-SCNC: 13 MMOL/L (ref 10–20)
BUN SERPL-MCNC: 14 MG/DL (ref 9.8–20.1)
CALCIUM SERPL-MCNC: 7.5 MG/DL (ref 7.8–10.44)
CHLORIDE SERPL-SCNC: 110 MMOL/L (ref 98–107)
CO2 SERPL-SCNC: 24 MMOL/L (ref 23–31)
CREAT CL PREDICTED SERPL C-G-VRATE: 108 ML/MIN (ref 70–130)
GLUCOSE SERPL-MCNC: 107 MG/DL (ref 83–110)
HGB BLD-MCNC: 10.2 G/DL (ref 12–16)
MCH RBC QN AUTO: 29.5 PG (ref 27–31)
MCV RBC AUTO: 94.3 FL (ref 78–98)
MDIFF COMPLETE?: YES
PLATELET # BLD AUTO: 196 THOU/UL (ref 130–400)
POTASSIUM SERPL-SCNC: 3.3 MMOL/L (ref 3.5–5.1)
RBC # BLD AUTO: 3.44 MILL/UL (ref 4.2–5.4)
SODIUM SERPL-SCNC: 144 MMOL/L (ref 136–145)
WBC # BLD AUTO: 25.2 THOU/UL (ref 4.8–10.8)

## 2020-12-30 RX ADMIN — LACTOBACILLUS ACIDOPH-L.BULGARICUS 1 MILLION CELL CHEWABLE TABLET SCH TAB: at 20:24

## 2020-12-30 RX ADMIN — PANTOPRAZOLE SODIUM SCH MG: 40 GRANULE, DELAYED RELEASE ORAL at 20:14

## 2020-12-30 RX ADMIN — MEROPENEM AND SODIUM CHLORIDE SCH MLS: 1 INJECTION, SOLUTION INTRAVENOUS at 05:46

## 2020-12-30 RX ADMIN — MEROPENEM AND SODIUM CHLORIDE SCH MLS: 1 INJECTION, SOLUTION INTRAVENOUS at 13:22

## 2020-12-30 RX ADMIN — Medication SCH UNITS: at 20:14

## 2020-12-30 RX ADMIN — MEROPENEM AND SODIUM CHLORIDE SCH MLS: 1 INJECTION, SOLUTION INTRAVENOUS at 23:00

## 2020-12-30 NOTE — PDOC.FM
- Subjective


Subjective: 





Patient resting comfortably in bed. Reportedly had intermittent episodes of 

agitation yesterday and overnight requiring mitts and bandaging of her arms to 

keep her from pulling out her lines. 





- Objective


Vital Signs & Weight: 


                             Vital Signs (12 hours)











  Temp Pulse Ox


 


 12/30/20 04:00  98.7 F 


 


 12/30/20 00:00  98.4 F 


 


 12/29/20 20:00   97


 


 12/29/20 19:00  97.9 F 








                                     Weight











Weight                         88.592 kg











                            Most Recent Monitor Data











Heart Rate from ECG            84


 


NIBP                           113/64


 


NIBP BP-Mean                   80


 


Respiration from ECG           23


 


SpO2                           99














I&O: 


                                        











 12/28/20 12/29/20 12/30/20





 06:59 06:59 06:59


 


Intake Total  3100 2691.6


 


Output Total  300 2990


 


Balance  2800 -298.4











Result Diagrams: 


                                 12/30/20 04:03





                                 12/30/20 04:03





Phys Exam





- Physical Examination


Constitutional: NAD


HEENT: moist MMs


Neck: supple, full ROM


Respiratory: no wheezing, clear to auscultation bilateral


Cardiovascular: RRR, no significant murmur


Gastrointestinal: soft, non-tender


trace BLE edema


Skin: no rash





Dx/Plan


(1) Sepsis associated hypotension


Code(s): A41.9 - SEPSIS, UNSPECIFIED ORGANISM; I95.9 - HYPOTENSION, UNSPECIFIED 

 Status: Acute   





(2) History of MI (myocardial infarction)


Code(s): I25.2 - OLD MYOCARDIAL INFARCTION   Status: Chronic   





- Plan


Plan: 





#Sepsis likely 2/2 infection s/p lithotripsy


-Patient s/p 2 cardioversions, multiple rounds of digoxin and adenoine. Pulse 

80s-low 100s overnight. 


-Patient's BPs 90s-130s/60s-80s on levophed through PICC line, continue to wean 

as tolerated


-Cardiology consulted, appreciate recs


   -started metoprolol 12.5 BID


   -echo from 11/25/20: EF 55-60%


- TSH and electrolytes normal. 


- WBC elevated at 25.2, Procal 155>83.94, started on meropenem 12/28, +vanc by 

Dr. Vegas. Will continue and anticipate long-term abx will be necessary


- PICC line does not appear visibly infected


- Blood and urine cx pending, NGTD


- Anticipate ACTH stim test this morning per Dr. Mehta





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 


-Aware, resume home meds. Frequent reorientation & delirium precautions. 





#HTN: Will resume home meds as tolerated.





#chronic constipation: Will continue a bowel regimen while inpatient.





#Chronic Recurrent UTIs & renal stones: 


-Aware, Dr. Vegas to continue to follow


-urine appears bloody, though nursing reports this has improved 





#HLD: Resume home meds





#Hx of MI: Resume home meds 





#Elevated troponin, resolved


-0.071>0.094>0.077


-likely due to demand from tachycardia


-patient denies chest pain, pulse mid-90s





#GERD: Home meds





PCP: Kirk


VTE PPX: Lovenox


GI PPX: Home PPI


Code status: FULL CODE


Dispo: Admitted to ICU for continued IV levophed through her PICC line for 

pressure support; cardiology consulted, appreciate recs; continue abx for 

suspected infection  








Addendum - Attending





- Attending Attestation


Date/Time: 12/30/20 7227





I personally evaluated the patient and discussed the management with Dr. Ferris.


I agree with the History, Examination, Assessment and Plan documented above with

any addition or exceptions noted below.

## 2020-12-30 NOTE — PRG
DATE OF SERVICE:  12/30/2020



She is afebrile.  Vital signs are better.  She is on a very low dose of Levophed

drip currently.  She had good urine output.  Her urine is much clearer.  She seemed

quite comfortable.  She is sleeping now.  She is having some cortisol test done this

morning.  Her blood cultures are still negative.  I do not see a urine culture.  I

am pretty sure one was set up.  It may be that just it had not reached the computer

yet.  White count is still elevated.  She certainly looks very comfortable right

now, and in terms of her vital signs, does not look toxic at this point.  I imagine

the next day she can probably be transferred to the floor as her Levophed drip will

probably be off.  It is unclear as to the cause of her blood pressure issues and

tachycardia.  I would think that it could be seeding from infected stones when we

broke them up with the laser, although she was covered with antibiotics for the

procedure.  I think while she is in the hospital, we should keep her on her IV

antibiotics, and then when she is ready to be discharged if she has done well for a

couple of days off them and none of her cultures are positive, I think we should

probably send her home not on IV antibiotics.  I am available today, but I am going

to be out the next four days.  I will let the urologist on-call know about her, so

if Urology consult is needed, you can contact them. 







Job ID:  [id number]

## 2020-12-30 NOTE — PRG
DATE OF SERVICE:  12/30/2020



OBJECTIVE:  VITAL SIGNS:  Ms. Matute's heart rate 70, blood pressure 116/66,

respiratory rate is 20. 

GENERAL:  She is in no distress. 

LUNGS:  Clear. 

HEART:  Regular rhythm. 

ABDOMEN:  Soft.



LABORATORY DATA:  White count 25.2, hemoglobin 10.2, and platelets 196.

Electrolytes are unremarkable except for potassium of 3.3.  Her cortisol base was

15, and it went to 33 with ACTH, so she does not have adrenal insufficiency. 



IMPRESSION:  Status post lithotripsy of chronically infected kidney stones. 



We will continue supportive care.  She is stable to move out of Critical Care in my

opinion. 







Job ID:  [id number]

## 2020-12-31 LAB
ANION GAP SERPL CALC-SCNC: 12 MMOL/L (ref 10–20)
BUN SERPL-MCNC: 12 MG/DL (ref 9.8–20.1)
CALCIUM SERPL-MCNC: 7.5 MG/DL (ref 7.8–10.44)
CHLORIDE SERPL-SCNC: 109 MMOL/L (ref 98–107)
CO2 SERPL-SCNC: 23 MMOL/L (ref 23–31)
CREAT CL PREDICTED SERPL C-G-VRATE: 127 ML/MIN (ref 70–130)
GLUCOSE SERPL-MCNC: 85 MG/DL (ref 83–110)
HGB BLD-MCNC: 9.8 G/DL (ref 12–16)
MCH RBC QN AUTO: 30.2 PG (ref 27–31)
MCV RBC AUTO: 95.1 FL (ref 78–98)
MDIFF COMPLETE?: YES
PLATELET # BLD AUTO: 167 THOU/UL (ref 130–400)
POTASSIUM SERPL-SCNC: 3.5 MMOL/L (ref 3.5–5.1)
RBC # BLD AUTO: 3.24 MILL/UL (ref 4.2–5.4)
SODIUM SERPL-SCNC: 140 MMOL/L (ref 136–145)
WBC # BLD AUTO: 19.7 THOU/UL (ref 4.8–10.8)

## 2020-12-31 RX ADMIN — Medication SCH UNITS: at 21:26

## 2020-12-31 RX ADMIN — MEROPENEM AND SODIUM CHLORIDE SCH MLS: 1 INJECTION, SOLUTION INTRAVENOUS at 21:25

## 2020-12-31 RX ADMIN — MEROPENEM AND SODIUM CHLORIDE SCH MLS: 1 INJECTION, SOLUTION INTRAVENOUS at 05:01

## 2020-12-31 RX ADMIN — LACTOBACILLUS ACIDOPH-L.BULGARICUS 1 MILLION CELL CHEWABLE TABLET SCH TAB: at 21:26

## 2020-12-31 RX ADMIN — MEROPENEM AND SODIUM CHLORIDE SCH MLS: 1 INJECTION, SOLUTION INTRAVENOUS at 15:23

## 2020-12-31 RX ADMIN — PANTOPRAZOLE SODIUM SCH MG: 40 GRANULE, DELAYED RELEASE ORAL at 21:28

## 2020-12-31 NOTE — PDOC.FM
- Subjective


Subjective: 





Patient doing well this morning, zero complaints. Discussed plan of care, 

patient agreeable





- Objective


Vital Signs & Weight: 


                             Vital Signs (12 hours)











  Temp Pulse Resp BP Pulse Ox


 


 12/31/20 05:05      95


 


 12/31/20 03:30  97.7 F  71  16  119/58 L  95


 


 12/31/20 00:00   62   


 


 12/30/20 22:29  98.5 F  66  16  116/55 L  98


 


 12/30/20 19:23      100


 


 12/30/20 19:00  98.2 F    








                                     Weight











Weight                         88.592 kg











                            Most Recent Monitor Data











Heart Rate from ECG            71


 


NIBP                           99/52


 


NIBP BP-Mean                   67


 


Respiration from ECG           19


 


SpO2                           100














I&O: 


                                        











 12/29/20 12/30/20 12/31/20





 06:59 06:59 06:59


 


Intake Total 3100 2821.6 1325.6


 


Output Total 300 3165 700


 


Balance 2800 -343.4 625.6











Result Diagrams: 


                                 12/31/20 04:30





                                 12/31/20 04:30





Phys Exam





- Physical Examination


Constitutional: NAD


HEENT: moist MMs, sclera anicteric


Neck: supple, full ROM


Respiratory: no wheezing, clear to auscultation bilateral


Cardiovascular: RRR, no significant murmur


Gastrointestinal: soft


slightly ttp lower abdomen, bruising noted on lower abdomen


trace edema BLE


Neurological: normal sensation


Psychiatric: normal affect


Skin: no rash





Dx/Plan


(1) Sepsis associated hypotension


Code(s): A41.9 - SEPSIS, UNSPECIFIED ORGANISM; I95.9 - HYPOTENSION, UNSPECIFIED 

 Status: Acute   





(2) History of MI (myocardial infarction)


Code(s): I25.2 - OLD MYOCARDIAL INFARCTION   Status: Chronic   





- Plan


Plan: 





#Sepsis likely 2/2 infection s/p lithotripsy


-Patient s/p 2 cardioversions, multiple rounds of digoxin and adenosine. Pulse 

60s-70s overnight. 


-Patient's BPs 110s-120s/50s-80s off of levophed, stopped last night


-Cardiology consulted, appreciate recs


   -started metoprolol 12.5 BID


   -echo from 11/25/20: EF 55-60%


- TSH and electrolytes normal. 


- WBC elevated at 19.7, Procal 155>83.94>38.98, started on meropenem 12/28, 

+vanc by Dr. Vegas. Will continue and consider long-term abx 


- Blood Cx NGTD


- Urine Cx: gram neg rods, sensitivities pending (called micro 12/30 and asked 

for further workup)





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 


-Aware, resume home meds. Frequent reorientation & delirium precautions. 





#HTN: Will resume home meds as tolerated.





#chronic constipation: Will continue a bowel regimen while inpatient.





#Chronic Recurrent UTIs & renal stones: 


-Aware, Dr. Vegas to continue to follow





#HLD: Resume home meds





#Hx of MI: Resume home meds 





#Elevated troponin, resolved


-0.071>0.094>0.077


-likely due to demand from tachycardia


-patient denies chest pain, pulse mid-90s





#GERD: Home meds





PCP: Kirk


VTE PPX: Lovenox


GI PPX: Home PPI


Code status: FULL CODE


Dispo: Admitted to telemetry for continued cardiac monitoring; continue abx for 

suspected infection pending sensitivities








Addendum - Attending





- Attending Attestation


Date/Time: 12/31/20 9042





I personally evaluated the patient and discussed the management with Dr. Ferris. 


I agree with the History, Examination, Assessment and Plan documented above with

any addition or exceptions noted below.





Patient doing well. BP improved. Suspect transient bacteremia after procedure 

that led to her decompensation. She continues on IV abx and will await cx and 

will likely need placement for long term IV abx.

## 2021-01-01 ENCOUNTER — HOSPITAL ENCOUNTER (INPATIENT)
Dept: HOSPITAL 18 - NAV ACUTE | Age: 76
LOS: 20 days | Discharge: HOME | DRG: 862 | End: 2021-01-21
Attending: INTERNAL MEDICINE | Admitting: INTERNAL MEDICINE
Payer: MEDICARE

## 2021-01-01 VITALS — TEMPERATURE: 98.4 F

## 2021-01-01 VITALS — BODY MASS INDEX: 30.5 KG/M2

## 2021-01-01 VITALS — DIASTOLIC BLOOD PRESSURE: 59 MMHG | SYSTOLIC BLOOD PRESSURE: 120 MMHG

## 2021-01-01 DIAGNOSIS — I47.1: ICD-10-CM

## 2021-01-01 DIAGNOSIS — R47.01: ICD-10-CM

## 2021-01-01 DIAGNOSIS — T83.593A: ICD-10-CM

## 2021-01-01 DIAGNOSIS — Z20.822: ICD-10-CM

## 2021-01-01 DIAGNOSIS — T81.44XA: Primary | ICD-10-CM

## 2021-01-01 DIAGNOSIS — R53.81: ICD-10-CM

## 2021-01-01 DIAGNOSIS — I10: ICD-10-CM

## 2021-01-01 DIAGNOSIS — J44.9: ICD-10-CM

## 2021-01-01 DIAGNOSIS — T81.12XA: ICD-10-CM
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
## 2021-01-01 DIAGNOSIS — A41.59: ICD-10-CM

## 2021-01-01 DIAGNOSIS — Z93.0: ICD-10-CM

## 2021-01-01 DIAGNOSIS — F32.9: ICD-10-CM

## 2021-01-01 DIAGNOSIS — S06.309S: ICD-10-CM

## 2021-01-01 DIAGNOSIS — Z90.710: ICD-10-CM

## 2021-01-01 DIAGNOSIS — R41.89: ICD-10-CM

## 2021-01-01 DIAGNOSIS — F41.9: ICD-10-CM

## 2021-01-01 DIAGNOSIS — G81.91: ICD-10-CM

## 2021-01-01 DIAGNOSIS — I25.2: ICD-10-CM

## 2021-01-01 DIAGNOSIS — E78.5: ICD-10-CM

## 2021-01-01 DIAGNOSIS — K59.09: ICD-10-CM

## 2021-01-01 LAB
ANION GAP SERPL CALC-SCNC: 12 MMOL/L (ref 10–20)
BASOPHILS # BLD AUTO: 0.1 THOU/UL (ref 0–0.2)
BASOPHILS NFR BLD AUTO: 0.5 % (ref 0–1)
BUN SERPL-MCNC: 12 MG/DL (ref 9.8–20.1)
CALCIUM SERPL-MCNC: 7.7 MG/DL (ref 7.8–10.44)
CHLORIDE SERPL-SCNC: 106 MMOL/L (ref 98–107)
CO2 SERPL-SCNC: 27 MMOL/L (ref 23–31)
CREAT CL PREDICTED SERPL C-G-VRATE: 118 ML/MIN (ref 70–130)
EOSINOPHIL # BLD AUTO: 0.1 THOU/UL (ref 0–0.7)
EOSINOPHIL NFR BLD AUTO: 1.1 % (ref 0–10)
GLUCOSE SERPL-MCNC: 89 MG/DL (ref 83–110)
HGB BLD-MCNC: 10.1 G/DL (ref 12–16)
LYMPHOCYTES # BLD: 1.6 THOU/UL (ref 1.2–3.4)
LYMPHOCYTES NFR BLD AUTO: 14.5 % (ref 21–51)
MCH RBC QN AUTO: 30.4 PG (ref 27–31)
MCV RBC AUTO: 93.6 FL (ref 78–98)
MONOCYTES # BLD AUTO: 0.6 THOU/UL (ref 0.11–0.59)
MONOCYTES NFR BLD AUTO: 5.7 % (ref 0–10)
NEUTROPHILS # BLD AUTO: 8.4 THOU/UL (ref 1.4–6.5)
NEUTROPHILS NFR BLD AUTO: 78.2 % (ref 42–75)
PLATELET # BLD AUTO: 184 THOU/UL (ref 130–400)
POTASSIUM SERPL-SCNC: 3.5 MMOL/L (ref 3.5–5.1)
RBC # BLD AUTO: 3.33 MILL/UL (ref 4.2–5.4)
SODIUM SERPL-SCNC: 141 MMOL/L (ref 136–145)
WBC # BLD AUTO: 10.7 THOU/UL (ref 4.8–10.8)

## 2021-01-01 PROCEDURE — 87635 SARS-COV-2 COVID-19 AMP PRB: CPT

## 2021-01-01 PROCEDURE — 87086 URINE CULTURE/COLONY COUNT: CPT

## 2021-01-01 PROCEDURE — 85007 BL SMEAR W/DIFF WBC COUNT: CPT

## 2021-01-01 PROCEDURE — 81001 URINALYSIS AUTO W/SCOPE: CPT

## 2021-01-01 PROCEDURE — U0005 INFEC AGEN DETEC AMPLI PROBE: HCPCS

## 2021-01-01 PROCEDURE — U0003 INFECTIOUS AGENT DETECTION BY NUCLEIC ACID (DNA OR RNA); SEVERE ACUTE RESPIRATORY SYNDROME CORONAVIRUS 2 (SARS-COV-2) (CORONAVIRUS DISEASE [COVID-19]), AMPLIFIED PROBE TECHNIQUE, MAKING USE OF HIGH THROUGHPUT TECHNOLOGIES AS DESCRIBED BY CMS-2020-01-R: HCPCS

## 2021-01-01 PROCEDURE — 36415 COLL VENOUS BLD VENIPUNCTURE: CPT

## 2021-01-01 PROCEDURE — 80053 COMPREHEN METABOLIC PANEL: CPT

## 2021-01-01 PROCEDURE — 85025 COMPLETE CBC W/AUTO DIFF WBC: CPT

## 2021-01-01 PROCEDURE — 85027 COMPLETE CBC AUTOMATED: CPT

## 2021-01-01 RX ADMIN — MEROPENEM AND SODIUM CHLORIDE SCH MLS: 1 INJECTION, SOLUTION INTRAVENOUS at 15:19

## 2021-01-01 RX ADMIN — MEROPENEM AND SODIUM CHLORIDE SCH MLS: 1 INJECTION, SOLUTION INTRAVENOUS at 05:43

## 2021-01-01 NOTE — RAD
Abdomen one view



HISTORY: Abdomen pain.



COMPARISON: 12/9/2020.



FINDINGS: Gas and stool throughout the colon and small bowel. No significant dilatation.



Metallic filter overlies the inferior vena cava.



Bilateral ureteral stents appear unchanged in position. Multiple irregular calcifications projecting 
over right renal calyces are similar to the previous exam.



Degenerative changes lumbar spine. Osseous structures are demineralized. Chronic varus angulation of 
the right femoral neck again demonstrated with the appearance of a malaligned old subcapital

fracture.



Extensive cardiac monitor leads overlie the abdomen.



  



IMPRESSION :

Nonobstructive bowel gas pattern.



Bilateral ureteral stents in good position. Right renal calculi appear unchanged.



Chronic-type findings are stable.



Reported By: MARCUS Koehler 

Electronically Signed:  1/1/2021 3:41 PM

## 2021-01-01 NOTE — PDOC.FM
- Subjective


Subjective: 





Patient doing well this morning. No acute events overnight. Denies complaints. 





- Objective


Vital Signs & Weight: 


                             Vital Signs (12 hours)











  Temp Pulse Resp BP Pulse Ox


 


 01/01/21 04:48      94 L


 


 01/01/21 04:00  98.8 F  72  16  123/58 L  92 L


 


 01/01/21 00:00   68   


 


 12/31/20 19:00  97.8 F  76  16  126/60  94 L








                                     Weight











Weight                         88.592 kg











                            Most Recent Monitor Data











Heart Rate from ECG            71


 


NIBP                           99/52


 


NIBP BP-Mean                   67


 


Respiration from ECG           19


 


SpO2                           100














I&O: 


                                        











 12/30/20 12/31/20 01/01/21





 06:59 06:59 06:59


 


Intake Total 2821.6 1435.6 720


 


Output Total 3165 1450 550


 


Balance -343.4 -14.4 170











Result Diagrams: 


                                 01/01/21 04:28





                                 01/01/21 04:28





Phys Exam





- Physical Examination


Constitutional: NAD


HEENT: moist MMs, sclera anicteric


Neck: supple, full ROM


Respiratory: no wheezing, clear to auscultation bilateral


Cardiovascular: RRR, no significant murmur


Gastrointestinal: soft, non-tender, no distention


trace BLE edema


Neurological: normal sensation


Psychiatric: normal affect


Skin: no rash





Dx/Plan


(1) Sepsis associated hypotension


Code(s): A41.9 - SEPSIS, UNSPECIFIED ORGANISM; I95.9 - HYPOTENSION, UNSPECIFIED 

 Status: Acute   





(2) History of MI (myocardial infarction)


Code(s): I25.2 - OLD MYOCARDIAL INFARCTION   Status: Chronic   





- Plan


Plan: 





#Sepsis likely 2/2 infection s/p lithotripsy


-Patient s/p 2 cardioversions, multiple rounds of digoxin and adenosine. Pulse 

60s-70s overnight. 


-Patient's BPs 110s-120s/50s-80s off of levophed, stopped last night


-Cardiology consulted, appreciate recs


   -started metoprolol 12.5 BID


   -echo from 11/25/20: EF 55-60%


- TSH and electrolytes normal. 


- WBC elevated at 10.7, Procal 155>83.94>38.98>20.51, started on meropenem 

12/28, +vanc by Dr. Vegas. 


   -After discussion with Dr. Zhang 12/31 and urine cx sensitivity results, 

patient will need IV meropenem abx for 2 weeks and can then f/u with Dr. Vegas 

for repeat urine cultures


   - sent referral to [facility] for continued IV abx per patient's 

daughters preferences


- Blood Cx: Proteus, sensitivities pending


- Urine Cx: Proteus, sensitivities demonstrate sensitivity only to IV abx





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 


-Aware, resume home meds. Frequent reorientation & delirium precautions. 





#HTN: Resume home meds as tolerated.





#chronic constipation: Will continue a bowel regimen while inpatient.





#Chronic Recurrent UTIs & renal stones: 


-Aware, Dr. Vegas to continue to follow





#HLD: Resume home meds





#Hx of MI: Resume home meds 





#Elevated troponin, resolved


-0.071>0.094>0.077


-likely due to demand from tachycardia


-patient denies chest pain, pulse mid-90s





#GERD: Home meds





PCP: Kirk


VTE PPX: Lovenox


GI PPX: Home PPI


Code status: FULL CODE


Dispo: Admitted to telemetry for continued IV abx for multi-drug resistant UTI 

and bactermia; pending placement at [facility]








Addendum - Attending





- Attending Attestation


Date/Time: 12/31/20 0839





I personally evaluated the patient and discussed the management with Dr. Ferris. 


I agree with the History, Examination, Assessment and Plan documented above with

any addition or exceptions noted below.





Patient doing well. BP improved. Suspect transient bacteremia after procedure 

that led to her decompensation. She continues on IV abx and will await cx and 

will likely need placement for long term IV abx. 





Addendum - Attending





- Attending Attestation


Date/Time: 01/01/21 1130





I personally evaluated the patient and discussed the management with Dr. Ferris. 


I agree with the History, Examination, Assessment and Plan documented above with

any addition or exceptions noted below.





Patient stable. Continue Meropenem for Sepsis and bacteremia with ESBL Proteus. 

Awaiting placement for long term abx.

## 2021-01-02 LAB
ALBUMIN SERPL BCG-MCNC: 2.6 G/DL (ref 3.4–4.8)
ALP SERPL-CCNC: 93 U/L (ref 40–110)
ALT SERPL W P-5'-P-CCNC: 9 U/L (ref 8–55)
ANION GAP SERPL CALC-SCNC: 9 MMOL/L (ref 10–20)
AST SERPL-CCNC: 10 U/L (ref 5–34)
BILIRUB SERPL-MCNC: 0.3 MG/DL (ref 0.2–1.2)
BUN SERPL-MCNC: 10 MG/DL (ref 9.8–20.1)
CALCIUM SERPL-MCNC: 7.9 MG/DL (ref 7.8–10.44)
CHLORIDE SERPL-SCNC: 105 MMOL/L (ref 98–107)
CO2 SERPL-SCNC: 31 MMOL/L (ref 23–31)
CREAT CL PREDICTED SERPL C-G-VRATE: 127 ML/MIN (ref 70–130)
GLOBULIN SER CALC-MCNC: 3.5 G/DL (ref 2.4–3.5)
GLUCOSE SERPL-MCNC: 102 MG/DL (ref 83–110)
HGB BLD-MCNC: 10.7 G/DL (ref 12–16)
MCH RBC QN AUTO: 30.4 PG (ref 27–31)
MCV RBC AUTO: 91.4 FL (ref 78–98)
MDIFF COMPLETE?: YES
OVALOCYTES BLD QL SMEAR: (no result) (100X)
PLATELET # BLD AUTO: 180 THOU/UL (ref 130–400)
POTASSIUM SERPL-SCNC: 3.2 MMOL/L (ref 3.5–5.1)
RBC # BLD AUTO: 3.53 MILL/UL (ref 4.2–5.4)
SODIUM SERPL-SCNC: 142 MMOL/L (ref 136–145)
WBC # BLD AUTO: 7.7 THOU/UL (ref 4.8–10.8)

## 2021-01-02 RX ADMIN — LACTOBACILLUS ACIDOPH-L.BULGARICUS 1 MILLION CELL CHEWABLE TABLET SCH TAB: at 21:11

## 2021-01-02 RX ADMIN — Medication SCH UNITS: at 21:12

## 2021-01-02 RX ADMIN — PANTOPRAZOLE SODIUM SCH MG: 40 GRANULE, DELAYED RELEASE ORAL at 21:23
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
## 2021-01-03 RX ADMIN — PANTOPRAZOLE SODIUM SCH MG: 40 GRANULE, DELAYED RELEASE ORAL at 21:29

## 2021-01-03 RX ADMIN — Medication SCH UNITS: at 21:29

## 2021-01-03 RX ADMIN — LACTOBACILLUS ACIDOPH-L.BULGARICUS 1 MILLION CELL CHEWABLE TABLET SCH TAB: at 21:28

## 2021-01-04 RX ADMIN — PANTOPRAZOLE SODIUM SCH MG: 40 GRANULE, DELAYED RELEASE ORAL at 21:53

## 2021-01-04 RX ADMIN — Medication SCH UNITS: at 21:53

## 2021-01-04 RX ADMIN — LACTOBACILLUS ACIDOPH-L.BULGARICUS 1 MILLION CELL CHEWABLE TABLET SCH TAB: at 21:52

## 2021-01-04 NOTE — PRG
DATE OF SERVICE:  01/02/2021



SUBJECTIVE:  The patient lying in bed, resting well.  No complaints.  Has been

eating well, sleeping well with no respiratory distress. 



OBJECTIVE:  VITAL SIGNS:  Show temperature is 98.3, pulse 67, respirations 20, O2

saturations 95% on room air, blood pressure is 124/60. 

LUNGS:  Clear. 

CARDIAC:  Shows regular rhythm. 

ABDOMEN:  Obese, but nontender. 

SKIN/EXTREMITIES:  Show trace edema, clubbing.  No decubitus. 

NEUROLOGIC:  Shows right hemiplegia.



LABORATORY DATA:  White count of 7700, hematocrit 32, hemoglobin 10.  Sodium 140,

potassium low at 3.2, chloride 105, bicarb 31, BUN 10, creatinine 0.53, glucose 102,

calcium 7.9, total bilirubin 0.3, AST 10, ALT 9, albumin down to 2.6. 



ASSESSMENT:  

1. __________ infected renal calculi, status post lithotripsy, stenting, with

subsequent Proteus bacteremia, septic shock, now resolving on IV meropenem for a

full 10-day course until January 14th. 

2. Stable traumatic brain injury, right hemiplegia, expressive aphasia, and

cognitive deficits. 

3. Chronic obstructive pulmonary disease history, asymptomatic.

4. Hypertension, controlled to goal.

5. Chronic constipation, doing well.



PLAN:  

1. Continue IV meropenem for 10 days.

2. Continue stress ulcer and DVT prophylaxis.

3. Continue metoprolol 12.5 twice daily for recent  __________ tachycardia.  Monitor 

for bradycardia and hypotension and bronchospasms. 

4. Anxiety and depression.  Continue sertraline.







Job ID:  [id number]

## 2021-01-04 NOTE — PRG
DATE OF SERVICE:  01/03/2021



SUBJECTIVE:  The patient is lying in bed, feels well, eating her supper.  No cough,

shortness of breath, nausea, or vomiting.  Nurses have reported no concerns.  The

patient is tolerating her IV meropenem very well. 



OBJECTIVE:  VITAL SIGNS:  Showed temperature 98.4, pulse 70, respirations 18, O2

sats 96% on room air, blood pressure is 116/60. 

LUNGS:  Clear. 

CARDIAC EXAMINATION:  Regular rhythm. 

ABDOMEN:  Soft and nontender.



ASSESSMENT:  

1. Resolving resistant Proteus bacteremia and septic shock, on IV meropenem on full

10-day course until January the 14. 

2. 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

cognitive deficits. 

3. Chronic obstructive pulmonary disease, historically asymptomatic.

4. Hypertension, controlled to goal.



PLAN:  

1. Continue IV meropenem for a full 10-day course, finish on January 14.

2. Continue stress ulcer and DVT prophylaxis.

3. Monitor vital signs closely, particularly pulse, and continue metoprolol 12.5

twice daily for a recent episode of supraventricular tachycardia. 

4. Continue sertraline.







Job ID:  [id number]

## 2021-01-05 RX ADMIN — LACTOBACILLUS ACIDOPH-L.BULGARICUS 1 MILLION CELL CHEWABLE TABLET SCH TAB: at 21:35

## 2021-01-05 RX ADMIN — PANTOPRAZOLE SODIUM SCH MG: 40 GRANULE, DELAYED RELEASE ORAL at 21:37

## 2021-01-05 RX ADMIN — Medication SCH UNITS: at 21:36

## 2021-01-05 NOTE — DIS
DATE OF ADMISSION:  12/28/2020



DATE OF DISCHARGE:  01/01/2021



RESIDENT:  Gloria Ferris MD.



ADMITTING ATTENDING:  Vito Miller MD.



DISCHARGE ATTENDING:  Elieser De La Vega MD.



CONSULTS:  Urology, Dr. Vegas; Pulmonology, Dr. Mehta; Cardiology, Dr. Cortes.



PROCEDURES:  

1. 12/28/2020:  Right rigid and flexible ureteroscopy with laser lithotripsy and

stone retrieval and stone fragments in the right proximal ureter and the right 
upper

calyceal system.  Left rigid and flexible ureteroscopy with laser lithotripsy of
two ureteral

stones.  No stone retrieval on this side.  Bilateral stent placement.  Procedure
performed by Dr. Vegas.

2. Cardioversion x2 on 12/28/2020.



PRIMARY DIAGNOSES:  

1. Sepsis, likely due to infection, status post lithotripsy procedure.

2. Elevated troponin.



SECONDARY DIAGNOSES:  History of TBI with residual right-sided deficits and

short-term memory loss, hypertension, chronic constipation, chronic recurrent 
UTIs

and renal stones, hyperlipidemia, history of myocardial infarction, 
gastroesophageal

reflux disease. 



DISCHARGE MEDICATIONS:  

1. 650 mg acetaminophen p.o. q.4 hours.

2. 5000 units vitamin D3 p.o. h.s.

3. 40 mg Lovenox subcu 0900 hours.

4. 325 mg ferrous sulfate p.o. h.s.

5. 1 tab Floranex p.o. h.s.

6. 1 g meropenem IV q.8 hours.

7. 12.5 mg metoprolol tartrate p.o. b.i.d.

8. 40 mg pantoprazole p.o. h.s.

9. 1 tab pravastatin p.o. h.s.

10. 50 mg sertraline p.o. h.s. 



Discontinued medications: 10 mg simvastatin p.o. h.s.



HISTORY OF PRESENT ILLNESS/HOSPITAL COURSE:  Patient is a 75-year-old female, 
who

presented and was admitted from the PACU after she had bilateral lithotripsies 
and

bilateral ureteral stent replacements with Dr. Vegas.  After the procedure, her

heart rate was elevated into the 130s and a code green was called.  Dr. Cortes

from Cardiology was called at that point and the patient was given three rounds 
of

adenosine and one push of digoxin.  The patient also had two rounds of 
cardioversion

with no resolution of her tachycardia.  The patient was then admitted to the

hospital inpatient service.  She was started on an IV diltiazem drip and sent to
the

ICU.  She then was hypotensive and so Levophed was started through her PICC 
line.

Cardiology monitored her along with the medicine team in ICU for several days 
before

it was determined that she was stable and her blood pressures improved without

Levophed in place and so the diltiazem drip was able to be weaned.  She was 
started

on 12.5 mg metoprolol tartrate b.i.d. by Cardiology and this appears to have 
kept

her heart rate at bay. 



After both the pressors and the diltiazem were stopped, the patient was deemed

stable to move to telemetry and she was monitored here for several days.  Her 
pulse

and her blood pressures remained stable on the telemetry floor.  IV meropenem 
was

started when she moved up to the ICU and this was continued throughout her

hospitalization.  On the day before discharge, Dr. Zhang, the on-call 
urologist

was consulted and he recommended at least two further weeks of IV meropenem and 
then

patient could follow up with Dr. Vegas outpatient at that time for repeat urine

cultures.  Her urine and blood cultures did result with Proteus mirabilis,

multi-drug resistant and only sensitive to IV antibiotics, one of them including

meropenem.  Her white count reached a maximum of 25.2 before declining and was 
10.7

on the day of discharge.  Her procalcitonin was evaluated on admission and 
resulted

at 155 and was 20.5 on the day of discharge.  The patient's daughter, Michelle, 
was

called and it was discussed that she would prefer the patient go to the [facility], where she has gone to before for a prolonged course of IV antibiotics

through her PICC line.  Case Management was able to arrange this and a 
doc-to-doc

was performed with Dr. Bradley on 01/01/2021 before the patient was deemed 
stable

for discharge. 



Immediately before discharge, the daughter wanted to talk to this physician.  
The

patient appeared to be fatigued and tired and complained of some abdominal pain.

However, this pain had been present since her procedure on the 28th.  Due to the

tenderness of her abdomen and her lethargy, a KUB was obtained that was negative
for

any intraabdominal pathology.  The patient was able to belch a few times and 
then

appeared to awaken and asked for ice cream, which she tolerated entirely along 
with

a cup of peaches.  It was deemed that the patient's vitals and her physical 
state

were stable for discharge to the [facility] for continued IV 
antibiotics. 



DISPOSITION:  Stable.



DISCHARGE INSTRUCTIONS:  

1. Location:  [facility].

2. Diet:  Heart healthy.

3. Activity:  As tolerated.

4. Follow up with PCP, Dr. Bradley within 1 week after discharge from the swing
bed.







Job ID:  [id number]



MTDD

## 2021-01-05 NOTE — PRG
DATE OF SERVICE:  01/04/2021



SUBJECTIVE:  The patient lying in bed, eating supper.  No complaints.  Tolerating

antibiotics well. 



OBJECTIVE:  VITAL SIGNS:  Temperature is 98.8, pulse 69, respirations 20, O2 sats

93% on room air, blood pressure 130/63. 

LUNGS:  Clear. 

CARDIAC:  Regular rhythm. 

ABDOMEN:  Soft, nontender.



ASSESSMENT:  

1. Resolving resistant Proteus bacteremia and septic shock, on IV meropenem until

January 14th. 

2. 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

cognitive deficits. 

3. Hypertension, controlled to goal.

4. Chronic obstructive pulmonary disease by history.  No symptoms.



PLAN:  

1. Continue IV meropenem full 10-day course, to finish on January 14th.

2. Continue stress ulcer and DVT prophylaxis.

3. Continue to monitor vital signs on metoprolol because of recent episode of

supraventricular tachycardia. 

4. Continue sertraline.







Job ID:  [id number]

## 2021-01-06 LAB
AMM URATE MFR STONE: (no result) %
BLD.DRIED MFR STONE: (no result) %
CA BILIRUB MFR STONE: (no result) %
CA CARBONATE MFR STONE: (no result) %
CA H2 PHOS DIHYD MFR STONE: (no result) %
CA PHOS MFR STONE: (no result) %
CALCIUM OXALATE DIHYDRATE MFR STONE IR: (no result) %
CELL MATERIAL STONE EST-MCNT: (no result) %
CHOLEST MFR STONE: (no result) %
COM MFR STONE: (no result) %
CYSTINE MFR STONE: (no result) %
LABORATORY COMMENT REPORT: (no result)
NA URATE MFR STONE IR: (no result) %
NEWBERYITE MFR STONE: (no result) %
TRI-PHOS MFR STONE: 50 %
TRIAMTERENE MFR STONE: (no result) %
URATE DIHYD MFR STONE: (no result) %
URATE MFR STONE: (no result) %
WT STONE: 310 MG

## 2021-01-06 RX ADMIN — LACTOBACILLUS ACIDOPH-L.BULGARICUS 1 MILLION CELL CHEWABLE TABLET SCH TAB: at 21:17

## 2021-01-06 RX ADMIN — PANTOPRAZOLE SODIUM SCH MG: 40 GRANULE, DELAYED RELEASE ORAL at 21:19

## 2021-01-06 RX ADMIN — Medication SCH UNITS: at 21:17

## 2021-01-06 NOTE — PRG
DATE OF SERVICE:  01/05/2021



SUBJECTIVE:  Ms. Matute is resting in bed.  Her granddaughter is in the room.  She

denies any questions or concerns.  She is tolerating her antibiotics. 



OBJECTIVE:  VITAL SIGNS:  She is afebrile.  Heart rate is 68, respirations 18,

oxygen saturation 96% on room air, blood pressure 117/58. 

CARDIOVASCULAR:  S1, S2 plus. 

RESPIRATORY:  Normal vesicular breath sounds. 

ABDOMEN:  Soft, nontender.  Bowel sounds in all quadrants. 

EXTREMITIES:  Without cyanosis, clubbing. 

CENTRAL NERVOUS SYSTEM:  Generalized weakness.



IMPRESSION:  

1. Proteus bacteremia.

2. History of traumatic brain injury with right hemiplegia.

3. Hypertension.

4. Deconditioning.

5. Dyslipidemia.

6. Depression and anxiety.



PLAN:  

1. Continue current medications including antibiotics.

2. Nutritional support.

3. Weekly CBC, CMP, CRP, and sedimentation rate.

4. DVT prophylaxis-patient is on Lovenox.

5. Decubitus precautions.

6. Stress ulcer prophylaxis.

7. Physical therapy discussed with the patient and granddaughter in detail.  All

questions answered. 







Job ID:  [id number]

## 2021-01-07 RX ADMIN — Medication SCH UNITS: at 21:03

## 2021-01-07 RX ADMIN — LACTOBACILLUS ACIDOPH-L.BULGARICUS 1 MILLION CELL CHEWABLE TABLET SCH TAB: at 21:01

## 2021-01-07 RX ADMIN — PANTOPRAZOLE SODIUM SCH MG: 40 GRANULE, DELAYED RELEASE ORAL at 21:06

## 2021-01-08 NOTE — PRG
DATE OF SERVICE:  01/08/2021



SUBJECTIVE:  The patient feels well, lying in bed with no complaints of shortness of

breath. 



OBJECTIVE:  LUNGS:  Clear. 

CARDIAC:  Show regular rhythm. 

ABDOMEN:  Soft and nontender. 

SKIN/EXTREMITIES:  Show no edema, clubbing, or cyanosis. 

VITAL SIGNS:  Show to have temperature 97.2, pulse 73, respirations 20, O2 sats 96%

on room air, blood pressure 100/57. 



ASSESSMENT:  

1. Resolving Proteus bacteremia, on IV antibiotics until January 14th.

2. 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

3. Stable hypertension.



PLAN:  

1. Continue IV meropenem until January 14th.

2. Continue to monitor for aspiration.

3. Repeat labs in the a.m.

4. Continue stress ulcer and DVT prophylaxis.







Job ID:  [id number]

## 2021-01-08 NOTE — PQF
CLINICAL DOCUMENTATION CLARIFICATION FORM:





Dear : TAYE RAUSCH MD                             Date / Time: 01/08/2021

Please exercise your independent, professional judgment in responding to the 
clarification form. 

Clinical indicators are provided on the bottom of this form for your review





Please check appropriate box(es): to clarify the septic shock



[  X] Postprocedural sepsis with septic shock

[  ] Postprocedural sepsis without septic shock 

[  ] Other diagnosis ___________(Please specify if any)

[  ] Unable to determine

In addition, please specify:

Present on Admission (POA):  [ X ] Yes             [  ] No             [  ] 
Unable to determine





Physician Signature:                         Date/Time:

For continuity of documentation, please document condition throughout progress 
notes and discharge summary.  Thank You.

To be completed by CDI/Coding staff for physician review: 







 Present Clinical Indicators - Signs / Symptoms / Labs Results and Location in 

Medical Record

 

[x] Sepsis 2/2 infection s/p lithortripsy H&P on 12/28

 

[x] Patient has had persisitent hypotension, she has been transferred to the ICU
 Progress note on 12/29

 

[x] Hypotension after her lithotripsy Consult on 12/29

 

[x] In postoperative period, she had developed sudden tachycardia Consult on 
12/29

 

[x] Admitted to ICU for continued IV levophed through her PICC line for pressure
 support Family medicine PN on 12/29

 

  

 

Present Risk Factors                                                            
              Results and Location in Medical Record

 

[x ] Chronic UTI H&P on 12/28

 

[x] Aged person 75 yrs H&P on 12/28

 

[ x] Lithotripsy  Op note on 12/28

 

[  ]  

 

Present Treatments                                                              
               Results and Location in Medical Record

 

[x] Vancomycin 1.5gm IV Medication on 12/29

 

[x] Levophed 250ml IV Medication on 12/29, 12/30

 

[  ]  

 

[  ]  





CDS/ Signature: [facility]                            Phone #: _____________      
 Date/Time: 01/08/2021



                 This is a permanent part of the Medical Record

[facility]

## 2021-01-09 LAB
ALBUMIN SERPL BCG-MCNC: 3 G/DL (ref 3.4–4.8)
ALP SERPL-CCNC: 97 U/L (ref 40–110)
ALT SERPL W P-5'-P-CCNC: 13 U/L (ref 8–55)
ANION GAP SERPL CALC-SCNC: 12 MMOL/L (ref 10–20)
AST SERPL-CCNC: 17 U/L (ref 5–34)
BASOPHILS # BLD AUTO: 0.1 THOU/UL (ref 0–0.2)
BASOPHILS NFR BLD AUTO: 0.7 % (ref 0–1)
BILIRUB SERPL-MCNC: 0.3 MG/DL (ref 0.2–1.2)
BUN SERPL-MCNC: 18 MG/DL (ref 9.8–20.1)
CALCIUM SERPL-MCNC: 8.5 MG/DL (ref 7.8–10.44)
CHLORIDE SERPL-SCNC: 105 MMOL/L (ref 98–107)
CO2 SERPL-SCNC: 28 MMOL/L (ref 23–31)
CREAT CL PREDICTED SERPL C-G-VRATE: 120 ML/MIN (ref 70–130)
EOSINOPHIL # BLD AUTO: 0.1 THOU/UL (ref 0–0.7)
EOSINOPHIL NFR BLD AUTO: 1.6 % (ref 0–10)
GLOBULIN SER CALC-MCNC: 4 G/DL (ref 2.4–3.5)
GLUCOSE SERPL-MCNC: 118 MG/DL (ref 83–110)
HGB BLD-MCNC: 11.9 G/DL (ref 12–16)
LYMPHOCYTES # BLD AUTO: 1.7 THOU/UL (ref 1.2–3.4)
LYMPHOCYTES NFR BLD AUTO: 24.6 % (ref 21–51)
MCH RBC QN AUTO: 29.5 PG (ref 27–31)
MCV RBC AUTO: 93.3 FL (ref 78–98)
MONOCYTES # BLD AUTO: 0.7 THOU/UL (ref 0.11–0.59)
MONOCYTES NFR BLD AUTO: 9.5 % (ref 0–10)
NEUTROPHILS # BLD AUTO: 4.5 THOU/UL (ref 1.4–6.5)
NEUTROPHILS NFR BLD AUTO: 63.6 % (ref 42–75)
PLATELET # BLD AUTO: 317 THOU/UL (ref 130–400)
POTASSIUM SERPL-SCNC: 4.3 MMOL/L (ref 3.5–5.1)
RBC # BLD AUTO: 4.03 MILL/UL (ref 4.2–5.4)
SODIUM SERPL-SCNC: 141 MMOL/L (ref 136–145)
WBC # BLD AUTO: 7 THOU/UL (ref 4.8–10.8)

## 2021-01-09 RX ADMIN — PANTOPRAZOLE SODIUM SCH: 40 GRANULE, DELAYED RELEASE ORAL at 05:09

## 2021-01-09 RX ADMIN — Medication SCH UNITS: at 21:07

## 2021-01-09 RX ADMIN — LACTOBACILLUS ACIDOPH-L.BULGARICUS 1 MILLION CELL CHEWABLE TABLET SCH: at 05:08

## 2021-01-09 RX ADMIN — LACTOBACILLUS ACIDOPH-L.BULGARICUS 1 MILLION CELL CHEWABLE TABLET SCH TAB: at 21:10

## 2021-01-09 RX ADMIN — Medication SCH: at 05:08

## 2021-01-09 RX ADMIN — PANTOPRAZOLE SODIUM SCH MG: 40 GRANULE, DELAYED RELEASE ORAL at 21:06

## 2021-01-10 RX ADMIN — PANTOPRAZOLE SODIUM SCH MG: 40 GRANULE, DELAYED RELEASE ORAL at 20:47

## 2021-01-10 RX ADMIN — LACTOBACILLUS ACIDOPH-L.BULGARICUS 1 MILLION CELL CHEWABLE TABLET SCH TAB: at 20:45

## 2021-01-10 RX ADMIN — Medication SCH UNITS: at 20:46

## 2021-01-11 RX ADMIN — PANTOPRAZOLE SODIUM SCH MG: 40 GRANULE, DELAYED RELEASE ORAL at 21:09

## 2021-01-11 RX ADMIN — Medication SCH UNITS: at 21:09

## 2021-01-11 RX ADMIN — LACTOBACILLUS ACIDOPH-L.BULGARICUS 1 MILLION CELL CHEWABLE TABLET SCH TAB: at 21:09

## 2021-01-12 RX ADMIN — Medication SCH UNITS: at 20:20

## 2021-01-12 RX ADMIN — LACTOBACILLUS ACIDOPH-L.BULGARICUS 1 MILLION CELL CHEWABLE TABLET SCH TAB: at 20:20

## 2021-01-12 RX ADMIN — PANTOPRAZOLE SODIUM SCH MG: 40 GRANULE, DELAYED RELEASE ORAL at 20:21

## 2021-01-12 NOTE — PRG
DATE OF SERVICE:  01/11/2021



SUBJECTIVE:  The patient is sitting up, eating, no complaints.  Nurses have no

complaints. 



OBJECTIVE:  VITAL SIGNS:  Show temperature is 97.8, pulse 64, respirations 18, O2

sats 96% on room air, and blood pressure 104/52. 

LUNGS:  Clear. 

CARDIAC:  Showed regular rhythm. 

ABDOMEN:  Soft and nontender. 

NEUROLOGICAL:  Shows right hemiplegia.



ASSESSMENT:  

1. Resolving Proteus bacteremia, on IV meropenem until January 14.

2. Bilateral renal calculi, status post bilateral lithotripsy and stent placement on

the left, but not the right. 

3. Hypertension, controlled to goal.



PLAN:  

1. Continue IV meropenem until January 14th.

2. Continue to monitor for aspiration.

3. Continue to monitor vital signs.

4. Continue stress ulcer and DVT prophylaxis.







Job ID:  [id number]

## 2021-01-12 NOTE — PRG
DATE OF SERVICE:  01/10/2021



SUBJECTIVE:  The patient is lying in bed, sleeping, has not had any problems today.

Has eaten well and is having normal bowel movements. 



OBJECTIVE:  Shows, 

LUNGS:  Clear. 

CARDIAC:  Showed regular rhythm. 

ABDOMEN:  Soft and nontender. 

NEUROLOGICAL:  Shows stable right hemiplegia.



LABORATORY DATA:  White count 7000, hematocrit 37, hemoglobin 11.  Sodium 141,

potassium 4.3, chloride 105, bicarb 28, BUN 18, creatinine 0.56, glucose 118,

albumin up from 2.6 to 3.0, globulin 4.0.  Liver functions normal. 



ASSESSMENT:  

1. Resolving Proteus bacteremia secondary to infected renal calculi.

2. Stable right hemiplegia secondary to traumatic brain injury.

3. Stable hypertension.



PLAN:  

1. Continue IV meropenem until January 14.  Continue stress ulcer and DVT

prophylaxis. 

2. Continue to monitor for aspiration.







Job ID:  [id number]

## 2021-01-12 NOTE — PRG
DATE OF SERVICE:  01/12/2021



SUBJECTIVE:  The patient feels well, visiting with the daughter, is asking questions

about repeat labs and about whether she can stay until her procedure is scheduled

again by Dr. Vegas next week. 



OBJECTIVE:  __________ 

ABDOMEN:  Soft and nontender.



ASSESSMENT:  

1. Resolving Proteus bacteremia.

2. Bilateral renal calculi with stent in place bilaterally with the procedure

scheduled by Dr. Vegas next week and will consult with him __________ will be done

secondary to COVID. 

3. Stable traumatic brain injury with right hemiplegia.



PLAN:  Continue IV antibiotics until January 14th and plan on discharge on morning

of 15th. 



Discuss with Dr. Vegas's office about future plans for stent removal and further

lithotripsy. 







Job ID:  [id number]

## 2021-01-13 LAB
BACTERIA UR QL AUTO: (no result) HPF
PROT UR STRIP.AUTO-MCNC: 100 MG/DL
SP GR UR STRIP: 1.02 (ref 1–1.03)

## 2021-01-13 RX ADMIN — Medication SCH UNITS: at 20:42

## 2021-01-13 RX ADMIN — LACTOBACILLUS ACIDOPH-L.BULGARICUS 1 MILLION CELL CHEWABLE TABLET SCH TAB: at 20:42

## 2021-01-13 RX ADMIN — PANTOPRAZOLE SODIUM SCH MG: 40 GRANULE, DELAYED RELEASE ORAL at 20:43

## 2021-01-14 LAB
BASOPHILS # BLD AUTO: 0.1 THOU/UL (ref 0–0.2)
BASOPHILS NFR BLD AUTO: 1.4 % (ref 0–1)
EOSINOPHIL # BLD AUTO: 0.1 THOU/UL (ref 0–0.7)
EOSINOPHIL NFR BLD AUTO: 2.4 % (ref 0–10)
HGB BLD-MCNC: 11.2 G/DL (ref 12–16)
LYMPHOCYTES # BLD AUTO: 2 THOU/UL (ref 1.2–3.4)
LYMPHOCYTES NFR BLD AUTO: 35.7 % (ref 21–51)
MCH RBC QN AUTO: 29.9 PG (ref 27–31)
MCV RBC AUTO: 92.3 FL (ref 78–98)
MONOCYTES # BLD AUTO: 0.7 THOU/UL (ref 0.11–0.59)
MONOCYTES NFR BLD AUTO: 12.9 % (ref 0–10)
NEUTROPHILS # BLD AUTO: 2.6 THOU/UL (ref 1.4–6.5)
NEUTROPHILS NFR BLD AUTO: 47.7 % (ref 42–75)
PLATELET # BLD AUTO: 321 THOU/UL (ref 130–400)
RBC # BLD AUTO: 3.74 MILL/UL (ref 4.2–5.4)
WBC # BLD AUTO: 5.5 THOU/UL (ref 4.8–10.8)

## 2021-01-14 RX ADMIN — PANTOPRAZOLE SODIUM SCH MG: 40 GRANULE, DELAYED RELEASE ORAL at 20:59

## 2021-01-14 RX ADMIN — Medication SCH UNITS: at 20:58

## 2021-01-14 RX ADMIN — LACTOBACILLUS ACIDOPH-L.BULGARICUS 1 MILLION CELL CHEWABLE TABLET SCH TAB: at 21:00

## 2021-01-14 NOTE — PRG
DATE OF SERVICE:  01/13/2021



SUBJECTIVE:  The patient is awake and alert, but somewhat more confused and

agitated, yelling out at times when she is alone in the room and this is unchanged

from previous behavior. 



OBJECTIVE:  VITAL SIGNS:  Show temperature is 98.4, pulse 74, respirations 18, O2

sats 94% on room air, and blood pressure is 118/57. 

LUNGS:  Clear. 

CARDIAC:  Shows regular rhythm. 

ABDOMEN:  Soft, nontender.  Urinalysis does show persistent hematuria greater than

50 with 11 to 12 wbc. 



ASSESSMENT:  

1. Proteus bacteremia on IV meropenem to finish on the 14th, but with new change in

mental status concerning about recurrent persistent infection. 

2. Stable traumatic brain injury, possibly causing change in mental status today.

We will monitor closely. 

3. Stable right hemiplegia from traumatic brain injury with noted evidence of

aspiration. 

4. Hypertension, controlled to goal.



PLAN:  Obtain urine culture today.  Repeat CBC in the a.m.  Continue IV meropenem

and monitor neurological status. 







Job ID:  [id number]

## 2021-01-15 LAB
BASOPHILS # BLD AUTO: 0.1 THOU/UL (ref 0–0.2)
BASOPHILS NFR BLD AUTO: 1.3 % (ref 0–1)
EOSINOPHIL # BLD AUTO: 0.2 THOU/UL (ref 0–0.7)
EOSINOPHIL NFR BLD AUTO: 3.2 % (ref 0–10)
HGB BLD-MCNC: 11.6 G/DL (ref 12–16)
LYMPHOCYTES # BLD AUTO: 2 THOU/UL (ref 1.2–3.4)
LYMPHOCYTES NFR BLD AUTO: 33.9 % (ref 21–51)
MCH RBC QN AUTO: 30.7 PG (ref 27–31)
MCV RBC AUTO: 92.5 FL (ref 78–98)
MONOCYTES # BLD AUTO: 0.7 THOU/UL (ref 0.11–0.59)
MONOCYTES NFR BLD AUTO: 11.3 % (ref 0–10)
NEUTROPHILS # BLD AUTO: 2.9 THOU/UL (ref 1.4–6.5)
NEUTROPHILS NFR BLD AUTO: 50.4 % (ref 42–75)
PLATELET # BLD AUTO: 304 THOU/UL (ref 130–400)
RBC # BLD AUTO: 3.77 MILL/UL (ref 4.2–5.4)
WBC # BLD AUTO: 5.8 THOU/UL (ref 4.8–10.8)

## 2021-01-15 RX ADMIN — LACTOBACILLUS ACIDOPH-L.BULGARICUS 1 MILLION CELL CHEWABLE TABLET SCH TAB: at 21:00

## 2021-01-15 RX ADMIN — Medication SCH UNITS: at 21:00

## 2021-01-15 RX ADMIN — PANTOPRAZOLE SODIUM SCH MG: 40 GRANULE, DELAYED RELEASE ORAL at 21:02

## 2021-01-16 RX ADMIN — LACTOBACILLUS ACIDOPH-L.BULGARICUS 1 MILLION CELL CHEWABLE TABLET SCH TAB: at 20:43

## 2021-01-16 RX ADMIN — PANTOPRAZOLE SODIUM SCH MG: 40 GRANULE, DELAYED RELEASE ORAL at 20:47

## 2021-01-16 RX ADMIN — Medication SCH UNITS: at 20:43

## 2021-01-16 NOTE — PRG
DATE OF SERVICE:  01/16/2021



SUBJECTIVE:  The patient is here for PT and OT services along with IV meropenem for

Proteus bacteremia.  The patient continued to have mental status changes and primary

team is concerned about a persistent infection.  A UA was taken, which was positive

for UTI with 11-12 wbc's and hematuria.  The patient was continued on meropenem,

even though she was scheduled to be finished on the 14th.  Her meropenem has been

continued due to concern for persistent infection.  We are awaiting urine culture

for this.  Otherwise, the patient is awake and alert, however, still confused.  No

acute distress. 



OBJECTIVE:  VITAL SIGNS:  Temperature 97.3 to 97.9, pulse 67 to 70, respirations 18

to 20, O2 sats 94% to 95% on room air, blood pressure 120/54 to 114/60. 

HEART:  Regular rate and rhythm.  No murmurs, gallops, or rubs. 

LUNGS:  Clear to auscultation bilaterally. 

ABDOMEN:  Soft, nontender to palpation.  Bowel sounds positive in all 4 quadrants.



ASSESSMENT:  

1. Proteus bacteremia, on IV meropenem, with continued course due to concern for

recurrent persistent infection. 

2. Stable traumatic brain injury.

3. Stable right hemiplegia.

4. Hypertension, controlled at goal.



PLAN:  Continue IV meropenem.  We will continue to trend CBCs to look for any

leukocytosis and monitor neurologic status. 







Job ID:  [id number]

## 2021-01-17 RX ADMIN — Medication SCH UNITS: at 20:31

## 2021-01-17 RX ADMIN — LACTOBACILLUS ACIDOPH-L.BULGARICUS 1 MILLION CELL CHEWABLE TABLET SCH TAB: at 20:31

## 2021-01-17 RX ADMIN — PANTOPRAZOLE SODIUM SCH MG: 40 GRANULE, DELAYED RELEASE ORAL at 20:31

## 2021-01-18 RX ADMIN — LACTOBACILLUS ACIDOPH-L.BULGARICUS 1 MILLION CELL CHEWABLE TABLET SCH TAB: at 21:16

## 2021-01-18 RX ADMIN — Medication PRN ML: at 14:45

## 2021-01-18 RX ADMIN — Medication SCH ML: at 21:17

## 2021-01-18 RX ADMIN — Medication SCH UNITS: at 21:16

## 2021-01-18 RX ADMIN — PANTOPRAZOLE SODIUM SCH MG: 40 GRANULE, DELAYED RELEASE ORAL at 21:16

## 2021-01-18 NOTE — PRG
DATE OF SERVICE:  01/18/2021



SUBJECTIVE:  The patient feels well, lying in bed with no complaints.  Still on IV

meropenem.  Awaiting urological removal of her stents and possibly her more

lithotripsy as her urologist is requested she will be on antibiotics prior to the

procedure. 



OBJECTIVE:  VITAL SIGNS:  Shows temperature is 96.6, pulse 64, respirations 16, O2

saturations 95% on room air, blood pressure 125/61. 

LUNGS:  Clear. 

CARDIAC:  Shows regular rhythm. 

ABDOMEN:  Soft and nontender.



ASSESSMENT:  

1. Resolving Proteus bacteremia.

2. Persistent renal calculi and urinary tract infection.

3. 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

4. Stable hypertension.



PLAN:  

1. Continue IV meropenem.

2. Coordinate transfer to [facility] for lithotripsy.

3. Continue to monitor vital signs closely.







Job ID:  [id number]

## 2021-01-19 RX ADMIN — Medication PRN ML: at 13:45

## 2021-01-19 RX ADMIN — Medication SCH ML: at 08:29

## 2021-01-19 RX ADMIN — Medication SCH UNITS: at 21:09

## 2021-01-19 RX ADMIN — LACTOBACILLUS ACIDOPH-L.BULGARICUS 1 MILLION CELL CHEWABLE TABLET SCH TAB: at 21:09

## 2021-01-19 RX ADMIN — PANTOPRAZOLE SODIUM SCH MG: 40 GRANULE, DELAYED RELEASE ORAL at 21:11

## 2021-01-19 RX ADMIN — Medication SCH ML: at 21:11

## 2021-01-20 VITALS — TEMPERATURE: 98.1 F

## 2021-01-20 RX ADMIN — PANTOPRAZOLE SODIUM SCH MG: 40 GRANULE, DELAYED RELEASE ORAL at 21:37

## 2021-01-20 RX ADMIN — Medication SCH ML: at 21:39

## 2021-01-20 RX ADMIN — Medication SCH ML: at 12:09

## 2021-01-20 RX ADMIN — LACTOBACILLUS ACIDOPH-L.BULGARICUS 1 MILLION CELL CHEWABLE TABLET SCH: at 23:59

## 2021-01-20 RX ADMIN — Medication SCH UNITS: at 21:37

## 2021-01-21 ENCOUNTER — HOSPITAL ENCOUNTER (OUTPATIENT)
Dept: HOSPITAL 92 - SDC | Age: 76
Discharge: HOME | End: 2021-01-21
Attending: UROLOGY
Payer: MEDICARE

## 2021-01-21 VITALS — DIASTOLIC BLOOD PRESSURE: 57 MMHG | SYSTOLIC BLOOD PRESSURE: 108 MMHG

## 2021-01-21 VITALS — BODY MASS INDEX: 14.7 KG/M2

## 2021-01-21 DIAGNOSIS — Z79.899: ICD-10-CM

## 2021-01-21 DIAGNOSIS — I10: ICD-10-CM

## 2021-01-21 DIAGNOSIS — G82.20: ICD-10-CM

## 2021-01-21 DIAGNOSIS — J44.9: ICD-10-CM

## 2021-01-21 DIAGNOSIS — N20.0: Primary | ICD-10-CM

## 2021-01-21 PROCEDURE — 82365 CALCULUS SPECTROSCOPY: CPT

## 2021-01-21 PROCEDURE — 74420 UROGRAPHY RTRGR +-KUB: CPT

## 2021-01-21 PROCEDURE — 93010 ELECTROCARDIOGRAM REPORT: CPT

## 2021-01-21 PROCEDURE — 93005 ELECTROCARDIOGRAM TRACING: CPT

## 2021-01-21 PROCEDURE — 0TC08ZZ EXTIRPATION OF MATTER FROM RIGHT KIDNEY, VIA NATURAL OR ARTIFICIAL OPENING ENDOSCOPIC: ICD-10-PCS | Performed by: UROLOGY

## 2021-01-21 PROCEDURE — 88300 SURGICAL PATH GROSS: CPT

## 2021-01-21 RX ADMIN — Medication SCH: at 08:47

## 2021-01-21 RX ADMIN — Medication PRN ML: at 05:00

## 2021-01-21 NOTE — PRG
DATE OF SERVICE:  01/20/2021



SUBJECTIVE:  The patient is awake and alert, in no distress.  Eating well with no

cough or shortness of breath. 



OBJECTIVE:  VITAL SIGNS:  Temperature 98.1, pulse 61, respirations 20, O2 sats 93%

on room air, blood pressure 114/53. 

LUNGS:  Clear. 

CARDIAC:  Shows regular rhythm. 

ABDOMEN:  Soft and nontender. 

NEUROLOGIC:  Shows right hemiplegia.



ASSESSMENT:  

1. Resolving Proteus bacteremia and sepsis, status post lithotripsy for persistent

renal calculi schedule for discharge tomorrow for outpatient lithotripsy again by

Dr. Dileep Vegas. 

2. Stable right hemiplegia secondary to distant traumatic brain injury.

3. Stable hypertension.



PLAN:  Continue IV meropenem until tomorrow morning and discharged to be transferred

by ambulance for outpatient lithotripsy under the care of Dr. Dileep Vegas. 







Job ID:  [id number]

## 2021-01-21 NOTE — PRG
DATE OF SERVICE:  01/19/2021



SUBJECTIVE:  The patient is sitting up in bed, eating supper, alert, in no distress.

 Baseline mental status. 



OBJECTIVE:  VITAL SIGNS:  Show temperature is 96.5, pulse 57, respirations 18, O2

saturations 95% on room air, and blood pressure is 119/56. 

LUNGS:  Clear. 

CARDIAC:  Shows regular rhythm. 

ABDOMEN:  Obese, nontender. 

SKIN/EXTREMITIES:  Showed no edema, clubbing, or cyanosis. 

NEUROLOGIC:  Shows chronic right hemiplegia.



ASSESSMENT:  

1. Resolving Proteus bacteremia.

2. Persistent renal calculi.

3. Stable traumatic brain injury, right hemiplegia.

4. Stable hypertension.



PLAN:  Continue IV meropenem until discharge on January 21st, will be discharged and

transferred to [facility] [place] for outpatient lithotripsy. 







Job ID:  [id number]

## 2021-01-21 NOTE — OP
DATE OF PROCEDURE:  01/21/2021



PREOPERATIVE DIAGNOSES:  Right renal stones, left ureteral stent.



POSTOPERATIVE DIAGNOSES:  Right renal stones, left ureteral stent.



PROCEDURES PERFORMED:  Cysto, removal of left stent, right flexible ureteroscopy

with laser lithotripsy, stone retrieval, and stent replacement on the right. 



ANESTHESIA:  General.



ESTIMATED BLOOD LOSS:  Minimal.



FINDINGS:  She had numerous stone fragments in the upper mid and lower calyceal

systems.  The most of these were quite small, some of them with the largest

measuring about 6 x 8 mm.  We used a laser to break up some of the slightly larger

stones and then removed all the stones.  We could remove a Nitinol basket and then

we used a laser on the small stone fragments remaining in the calyceal systems to

get them just a small as we could, so hopefully she can pass them on her own.

Retrograde study done at the end of the case showed no extravasation, no

obstruction. 



DRAINS PLACED:  A 7 x 24 double-J stent with a black string attached to the exiting

urethral meatus.  We will have her family take this out in a few days at home when

they have done this before. 



DESCRIPTION OF PROCEDURE:  After obtaining written and verbal consent from the

patient's family and after documenting normal preoperative blood work, she was taken

to the operating suite.  She was placed in the supine position on the treatment

table, given a general anesthetic and oral intubation, placed in the dorsal

lithotomy position, sterilely prepped and draped.  Cystoscopy was performed with a

22-Cymraes sheath, this was well lubricated and passed under direct vision through

the female urethra into the urinary bladder with aid of a 30-degree lens and video

camera and monitor.  The bladder was filled and emptied number of times, and then

the left double-J stent was grasped and removed intact, and then the right double-J

stent was grasped and removed through the urethral meatus.  A guidewire was fed up

through this and the stent was removed over the guidewire and discarded.  A

dual-lumen catheter was placed over the guidewire about 2 to 3 cm up the right

ureter and contrast was injected through the second lumen, showing up the ureter.

There was no extravasation or obstruction along the course of the ureter.  The blue

guidewire was then placed adjacent to the green guidewire through the dual-lumen

catheter, and the dual-lumen catheter was removed, and ureteral sheath was brought

in with obturator and placed over our blue stiff wire and up into the renal pelvis.

The wire and the obturator were removed.  A flexible scope was brought in and passed

up through the ureteral sheath into the renal pelvis.  Then, we painstakingly went

through the calyceal system using Nitinol basket to extract all fragments.  We could

use laser to break up any pieces that were unable to basket.  Once we had completely

done this through all the calyceal system and saw very good clearing of the stone

fragments based on fluoroscopic visualization, we went again through the calyceal

systems using the laser to break up the remaining very small fragments that we could

not basket into even smaller fragments in hopes that she will be able to pass these

with urinary flow.  At this point, leaving our green wire in place, we backed out

our ureteral sheath under direct vision.  The ureter looked fine as the sheath was

removed.  We then backloaded our green guidewire through our cystoscopic sheath and

placed a 5-Cymraes Pollack catheter over the guidewire up in the region of renal

pelvis, injecting contrast filling out the collecting system.  There was no

extravasation or obstruction.  We replaced the green guidewire and then over that,

placed a 7 x 24 double-J stent pushing up into place so its proximal end coiled in

the upper calyceal system, its distal end coiled in the bladder when the wire was

removed.  The string was left exiting the urethra and cut short couple of inches

outside of the urethral meatus.  She was taken out of the dorsal lithotomy position,

awakened, extubated, and taken by stretcher to the recovery room. 







Job ID:  [id number]

## 2021-01-21 NOTE — RAD
Exam: Intraprocedure fluoroscopy for retrograde IVP



COMPARISON: 12/28/2020



FINDINGS:

Multiple fluoroscopic images demonstrate exchange of a right-sided double-J ureteral stent. Left uret
eral stent appears to have been removed. Incidental IVC filter is noted



IMPRESSION: Fluoroscopy as above.



Reported By: Summer Choi 

Electronically Signed:  1/21/2021 3:38 PM

## 2021-01-22 NOTE — EKG
Test Reason : PREOP

Blood Pressure : ***/*** mmHG

Vent. Rate : 062 BPM     Atrial Rate : 062 BPM

   P-R Int : 166 ms          QRS Dur : 102 ms

    QT Int : 456 ms       P-R-T Axes : 004 -46 036 degrees

   QTc Int : 462 ms

 

Normal sinus rhythm

Left anterior fascicular block

Abnormal ECG

Confirmed by STEVE REID ([id number]) on 1/22/2021 12:35:58 PM

 

Referred By:  LIBAN           Confirmed By:STEVE REID

## 2021-01-26 NOTE — PQF
CLINICAL DOCUMENTATION CLARIFICATION FORM: 





Dear : Mekhi Bradley MD                                    Date / Time: 
01/27/2021

Please exercise your independent, professional judgment in responding to the 
clarification form. 

Clinical indicators are provided on the bottom of this form for your review





Please check appropriate box(es):

[ x ] Sepsis is a complication of recent lithotripsy surgery

[ x ] Sepsis is a complication of urinary stents

[  ] sepsis is not a complication of recent lithotripsy surgery and urinary 
stents

[  ] Other diagnosis ___________(Please specify if any)

[  ] Unable to determine



In addition, please specify:

Present on Admission (POA):  [x  ] Yes             [  ] No             [  ] 
Unable to determine

Physician Signature:                         Date/Time:

For continuity of documentation, please document condition throughout progress 
notes and discharge summary.  Thank You.

To be completed by CDI/Coding staff for physician review: 







 Present Clinical Indicators - Signs / Symptoms / Labs Results and Location in 

Medical Record

 

[x] Undergone bilateral lithotripsy & stents , but had developed proteus gram-
negative bactermia with septic shock H&P on 01/02

 

[x] S/p lithotripsy, stenting, with subsequet proteus bacteremia,septic shock  
Progress notes on 01/02

 

[x] Resolving proteus bacteremia secondary to infected renal calculi Progress 
notes on 01/10

 

[x] Resolving proteus bacteremia, on IV meropenem until jan 14 Progress notes on
 01/11

 

[  ] Blood cultures 

 

[  ] Dehiscence 

 

[  ] Bleeding 

 

[  ] Infection 

 

Present Risk Factors                                                            
             Results and Location in Medical Record

 

[x] Recent surgery  bilateral lithotripsy & stents  H&P on 01/02

 

[  ] Poor healing factors (advanced age /  debility /  obesity / chronic 
conditions) 

 

[  ] Recent use of antibiotics 

 

[  ]  

 

Present Treatments                                                              
               Results and Location in Medical Record

 

[x] Meropenem 1gm Medication from 01/01 to 01/12

 

[  ] IV Fluids 

 

[  ] Surgical procedure/ Evaluation 

 

[  ]  





CDS/ Signature: [facility]                                      Phone #: 
_____________        Date/Time: 01/27/2021



                 This is a permanent part of the Medical Record

[facility]

## 2021-01-29 LAB
AMM URATE MFR STONE: (no result) %
BLD.DRIED MFR STONE: (no result) %
CA BILIRUB MFR STONE: (no result) %
CA CARBONATE MFR STONE: (no result) %
CA H2 PHOS DIHYD MFR STONE: (no result) %
CA PHOS MFR STONE: (no result) %
CALCIUM OXALATE DIHYDRATE MFR STONE IR: (no result) %
CELL MATERIAL STONE EST-MCNT: (no result) %
CHOLEST MFR STONE: (no result) %
COM MFR STONE: (no result) %
CYSTINE MFR STONE: (no result) %
LABORATORY COMMENT REPORT: (no result)
NA URATE MFR STONE IR: (no result) %
NEWBERYITE MFR STONE: (no result) %
TRI-PHOS MFR STONE: 60 %
TRIAMTERENE MFR STONE: (no result) %
URATE DIHYD MFR STONE: (no result) %
URATE MFR STONE: (no result) %
WT STONE: 390 MG

## 2021-05-15 ENCOUNTER — HOSPITAL ENCOUNTER (INPATIENT)
Dept: HOSPITAL 92 - ERS | Age: 76
LOS: 6 days | Discharge: NURSING, REHAB OR LONG-TERM CARE | DRG: 853 | End: 2021-05-21
Attending: FAMILY MEDICINE | Admitting: FAMILY MEDICINE
Payer: MEDICARE

## 2021-05-15 VITALS — BODY MASS INDEX: 28.8 KG/M2

## 2021-05-15 DIAGNOSIS — Z90.710: ICD-10-CM

## 2021-05-15 DIAGNOSIS — G81.91: ICD-10-CM

## 2021-05-15 DIAGNOSIS — J44.9: ICD-10-CM

## 2021-05-15 DIAGNOSIS — K82.8: ICD-10-CM

## 2021-05-15 DIAGNOSIS — E87.6: ICD-10-CM

## 2021-05-15 DIAGNOSIS — Z87.820: ICD-10-CM

## 2021-05-15 DIAGNOSIS — K56.41: ICD-10-CM

## 2021-05-15 DIAGNOSIS — I25.2: ICD-10-CM

## 2021-05-15 DIAGNOSIS — Z98.890: ICD-10-CM

## 2021-05-15 DIAGNOSIS — G93.41: ICD-10-CM

## 2021-05-15 DIAGNOSIS — I47.1: ICD-10-CM

## 2021-05-15 DIAGNOSIS — E78.5: ICD-10-CM

## 2021-05-15 DIAGNOSIS — N13.6: ICD-10-CM

## 2021-05-15 DIAGNOSIS — A41.51: Primary | ICD-10-CM

## 2021-05-15 DIAGNOSIS — Z79.899: ICD-10-CM

## 2021-05-15 DIAGNOSIS — E83.42: ICD-10-CM

## 2021-05-15 DIAGNOSIS — Z16.24: ICD-10-CM

## 2021-05-15 DIAGNOSIS — K80.20: ICD-10-CM

## 2021-05-15 DIAGNOSIS — K57.90: ICD-10-CM

## 2021-05-15 DIAGNOSIS — N20.2: ICD-10-CM

## 2021-05-15 DIAGNOSIS — I10: ICD-10-CM

## 2021-05-15 DIAGNOSIS — Z20.822: ICD-10-CM

## 2021-05-15 DIAGNOSIS — K52.89: ICD-10-CM

## 2021-05-15 DIAGNOSIS — K21.9: ICD-10-CM

## 2021-05-15 LAB
ALBUMIN SERPL BCG-MCNC: 3.4 G/DL (ref 3.4–4.8)
ALP SERPL-CCNC: 131 U/L (ref 40–110)
ALT SERPL W P-5'-P-CCNC: 26 U/L (ref 8–55)
ANION GAP SERPL CALC-SCNC: 19 MMOL/L (ref 10–20)
AST SERPL-CCNC: 32 U/L (ref 5–34)
BACTERIA UR QL AUTO: (no result) HPF
BILIRUB SERPL-MCNC: 1.2 MG/DL (ref 0.2–1.2)
BUN SERPL-MCNC: 19 MG/DL (ref 9.8–20.1)
CALCIUM SERPL-MCNC: 8.7 MG/DL (ref 7.8–10.44)
CHLORIDE SERPL-SCNC: 105 MMOL/L (ref 98–107)
CO2 SERPL-SCNC: 18 MMOL/L (ref 23–31)
CREAT CL PREDICTED SERPL C-G-VRATE: 0 ML/MIN (ref 70–130)
GLOBULIN SER CALC-MCNC: 4 G/DL (ref 2.4–3.5)
GLUCOSE SERPL-MCNC: 183 MG/DL (ref 83–110)
HGB BLD-MCNC: 15.1 G/DL (ref 12–16)
LEUKOCYTE ESTERASE UR QL STRIP.AUTO: 500 LEU/UL
LIPASE SERPL-CCNC: 6 U/L (ref 8–78)
MCH RBC QN AUTO: 31.4 PG (ref 27–31)
MCV RBC AUTO: 95.6 FL (ref 78–98)
MDIFF COMPLETE?: YES
PLATELET # BLD AUTO: 245 THOU/UL (ref 130–400)
POTASSIUM SERPL-SCNC: 3.3 MMOL/L (ref 3.5–5.1)
PROT UR STRIP.AUTO-MCNC: 200 MG/DL
RBC # BLD AUTO: 4.82 MILL/UL (ref 4.2–5.4)
SODIUM SERPL-SCNC: 139 MMOL/L (ref 136–145)
SP GR UR STRIP: 1.02 (ref 1–1.04)
WBC # BLD AUTO: 9.1 THOU/UL (ref 4.8–10.8)

## 2021-05-15 PROCEDURE — C1751 CATH, INF, PER/CENT/MIDLINE: HCPCS

## 2021-05-15 PROCEDURE — 84484 ASSAY OF TROPONIN QUANT: CPT

## 2021-05-15 PROCEDURE — 83735 ASSAY OF MAGNESIUM: CPT

## 2021-05-15 PROCEDURE — 86850 RBC ANTIBODY SCREEN: CPT

## 2021-05-15 PROCEDURE — 83690 ASSAY OF LIPASE: CPT

## 2021-05-15 PROCEDURE — 74420 UROGRAPHY RTRGR +-KUB: CPT

## 2021-05-15 PROCEDURE — 85025 COMPLETE CBC W/AUTO DIFF WBC: CPT

## 2021-05-15 PROCEDURE — 96365 THER/PROPH/DIAG IV INF INIT: CPT

## 2021-05-15 PROCEDURE — 87077 CULTURE AEROBIC IDENTIFY: CPT

## 2021-05-15 PROCEDURE — 87149 DNA/RNA DIRECT PROBE: CPT

## 2021-05-15 PROCEDURE — 93005 ELECTROCARDIOGRAM TRACING: CPT

## 2021-05-15 PROCEDURE — 82274 ASSAY TEST FOR BLOOD FECAL: CPT

## 2021-05-15 PROCEDURE — 83605 ASSAY OF LACTIC ACID: CPT

## 2021-05-15 PROCEDURE — 70450 CT HEAD/BRAIN W/O DYE: CPT

## 2021-05-15 PROCEDURE — 81003 URINALYSIS AUTO W/O SCOPE: CPT

## 2021-05-15 PROCEDURE — 87086 URINE CULTURE/COLONY COUNT: CPT

## 2021-05-15 PROCEDURE — 74177 CT ABD & PELVIS W/CONTRAST: CPT

## 2021-05-15 PROCEDURE — 71275 CT ANGIOGRAPHY CHEST: CPT

## 2021-05-15 PROCEDURE — 85027 COMPLETE CBC AUTOMATED: CPT

## 2021-05-15 PROCEDURE — 86900 BLOOD TYPING SEROLOGIC ABO: CPT

## 2021-05-15 PROCEDURE — 96367 TX/PROPH/DG ADDL SEQ IV INF: CPT

## 2021-05-15 PROCEDURE — 96368 THER/DIAG CONCURRENT INF: CPT

## 2021-05-15 PROCEDURE — 80048 BASIC METABOLIC PNL TOTAL CA: CPT

## 2021-05-15 PROCEDURE — 96375 TX/PRO/DX INJ NEW DRUG ADDON: CPT

## 2021-05-15 PROCEDURE — 0240U: CPT

## 2021-05-15 PROCEDURE — 76705 ECHO EXAM OF ABDOMEN: CPT

## 2021-05-15 PROCEDURE — 86901 BLOOD TYPING SEROLOGIC RH(D): CPT

## 2021-05-15 PROCEDURE — 84443 ASSAY THYROID STIM HORMONE: CPT

## 2021-05-15 PROCEDURE — 87040 BLOOD CULTURE FOR BACTERIA: CPT

## 2021-05-15 PROCEDURE — 51702 INSERT TEMP BLADDER CATH: CPT

## 2021-05-15 PROCEDURE — 87186 SC STD MICRODIL/AGAR DIL: CPT

## 2021-05-15 PROCEDURE — 36569 INSJ PICC 5 YR+ W/O IMAGING: CPT

## 2021-05-15 PROCEDURE — 80053 COMPREHEN METABOLIC PANEL: CPT

## 2021-05-15 PROCEDURE — 71045 X-RAY EXAM CHEST 1 VIEW: CPT

## 2021-05-15 PROCEDURE — 36415 COLL VENOUS BLD VENIPUNCTURE: CPT

## 2021-05-15 PROCEDURE — 81015 MICROSCOPIC EXAM OF URINE: CPT

## 2021-05-15 PROCEDURE — 84145 PROCALCITONIN (PCT): CPT

## 2021-05-15 PROCEDURE — 85007 BL SMEAR W/DIFF WBC COUNT: CPT

## 2021-05-16 LAB
ALBUMIN SERPL BCG-MCNC: 2.5 G/DL (ref 3.4–4.8)
ALP SERPL-CCNC: 70 U/L (ref 40–110)
ALT SERPL W P-5'-P-CCNC: 17 U/L (ref 8–55)
ANION GAP SERPL CALC-SCNC: 10 MMOL/L (ref 10–20)
AST SERPL-CCNC: 21 U/L (ref 5–34)
BILIRUB SERPL-MCNC: 0.4 MG/DL (ref 0.2–1.2)
BUN SERPL-MCNC: 17 MG/DL (ref 9.8–20.1)
CALCIUM SERPL-MCNC: 7.5 MG/DL (ref 7.8–10.44)
CHLORIDE SERPL-SCNC: 113 MMOL/L (ref 98–107)
CO2 SERPL-SCNC: 21 MMOL/L (ref 23–31)
CREAT CL PREDICTED SERPL C-G-VRATE: 77 ML/MIN (ref 70–130)
GLOBULIN SER CALC-MCNC: 3 G/DL (ref 2.4–3.5)
GLUCOSE SERPL-MCNC: 128 MG/DL (ref 83–110)
HGB BLD-MCNC: 11.7 G/DL (ref 12–16)
MCH RBC QN AUTO: 31.3 PG (ref 27–31)
MCV RBC AUTO: 97.2 FL (ref 78–98)
MDIFF COMPLETE?: YES
PLATELET # BLD AUTO: 210 THOU/UL (ref 130–400)
POTASSIUM SERPL-SCNC: 2.9 MMOL/L (ref 3.5–5.1)
POTASSIUM SERPL-SCNC: 3.6 MMOL/L (ref 3.5–5.1)
RBC # BLD AUTO: 3.75 MILL/UL (ref 4.2–5.4)
SODIUM SERPL-SCNC: 141 MMOL/L (ref 136–145)
WBC # BLD AUTO: 30.1 THOU/UL (ref 4.8–10.8)

## 2021-05-16 PROCEDURE — 0T788DZ DILATION OF BILATERAL URETERS WITH INTRALUMINAL DEVICE, VIA NATURAL OR ARTIFICIAL OPENING ENDOSCOPIC: ICD-10-PCS | Performed by: UROLOGY

## 2021-05-16 PROCEDURE — BT141ZZ FLUOROSCOPY OF KIDNEYS, URETERS AND BLADDER USING LOW OSMOLAR CONTRAST: ICD-10-PCS | Performed by: UROLOGY

## 2021-05-16 RX ADMIN — PANTOPRAZOLE SODIUM SCH MG: 40 GRANULE, DELAYED RELEASE ORAL at 20:14

## 2021-05-16 RX ADMIN — MEROPENEM AND SODIUM CHLORIDE SCH MLS: 1 INJECTION, SOLUTION INTRAVENOUS at 16:49

## 2021-05-16 RX ADMIN — Medication SCH UNITS: at 20:13

## 2021-05-16 RX ADMIN — MEROPENEM AND SODIUM CHLORIDE SCH MLS: 1 INJECTION, SOLUTION INTRAVENOUS at 22:12

## 2021-05-16 RX ADMIN — LACTOBACILLUS ACIDOPH-L.BULGARICUS 1 MILLION CELL CHEWABLE TABLET SCH TAB: at 20:13

## 2021-05-16 RX ADMIN — MEROPENEM AND SODIUM CHLORIDE SCH MLS: 1 INJECTION, SOLUTION INTRAVENOUS at 06:12

## 2021-05-17 LAB
ALBUMIN SERPL BCG-MCNC: 2.5 G/DL (ref 3.4–4.8)
ALP SERPL-CCNC: 81 U/L (ref 40–110)
ALT SERPL W P-5'-P-CCNC: 14 U/L (ref 8–55)
ANION GAP SERPL CALC-SCNC: 10 MMOL/L (ref 10–20)
ANION GAP SERPL CALC-SCNC: 9 MMOL/L (ref 10–20)
AST SERPL-CCNC: 17 U/L (ref 5–34)
BILIRUB SERPL-MCNC: 0.4 MG/DL (ref 0.2–1.2)
BUN SERPL-MCNC: 14 MG/DL (ref 9.8–20.1)
BUN SERPL-MCNC: 15 MG/DL (ref 9.8–20.1)
CALCIUM SERPL-MCNC: 7.6 MG/DL (ref 7.8–10.44)
CALCIUM SERPL-MCNC: 7.6 MG/DL (ref 7.8–10.44)
CHLORIDE SERPL-SCNC: 113 MMOL/L (ref 98–107)
CHLORIDE SERPL-SCNC: 113 MMOL/L (ref 98–107)
CO2 SERPL-SCNC: 22 MMOL/L (ref 23–31)
CO2 SERPL-SCNC: 23 MMOL/L (ref 23–31)
CREAT CL PREDICTED SERPL C-G-VRATE: 105 ML/MIN (ref 70–130)
CREAT CL PREDICTED SERPL C-G-VRATE: 107 ML/MIN (ref 70–130)
GLOBULIN SER CALC-MCNC: 3 G/DL (ref 2.4–3.5)
GLUCOSE SERPL-MCNC: 69 MG/DL (ref 83–110)
GLUCOSE SERPL-MCNC: 70 MG/DL (ref 83–110)
HGB BLD-MCNC: 11.4 G/DL (ref 12–16)
MAGNESIUM SERPL-MCNC: 2.1 MG/DL (ref 1.6–2.6)
MCH RBC QN AUTO: 31.7 PG (ref 27–31)
MCV RBC AUTO: 96.5 FL (ref 78–98)
MDIFF COMPLETE?: YES
PLATELET # BLD AUTO: 160 THOU/UL (ref 130–400)
POTASSIUM SERPL-SCNC: 3.3 MMOL/L (ref 3.5–5.1)
POTASSIUM SERPL-SCNC: 3.6 MMOL/L (ref 3.5–5.1)
RBC # BLD AUTO: 3.61 MILL/UL (ref 4.2–5.4)
SODIUM SERPL-SCNC: 141 MMOL/L (ref 136–145)
SODIUM SERPL-SCNC: 142 MMOL/L (ref 136–145)
WBC # BLD AUTO: 14.4 THOU/UL (ref 4.8–10.8)

## 2021-05-17 RX ADMIN — MEROPENEM AND SODIUM CHLORIDE SCH MLS: 1 INJECTION, SOLUTION INTRAVENOUS at 05:23

## 2021-05-17 RX ADMIN — MEROPENEM AND SODIUM CHLORIDE SCH MLS: 1 INJECTION, SOLUTION INTRAVENOUS at 22:46

## 2021-05-17 RX ADMIN — Medication SCH UNITS: at 19:48

## 2021-05-17 RX ADMIN — PANTOPRAZOLE SODIUM SCH MG: 40 GRANULE, DELAYED RELEASE ORAL at 19:47

## 2021-05-17 RX ADMIN — MEROPENEM AND SODIUM CHLORIDE SCH: 1 INJECTION, SOLUTION INTRAVENOUS at 17:19

## 2021-05-17 RX ADMIN — DOCUSATE SODIUM 50 MG AND SENNOSIDES 8.6 MG SCH TAB: 8.6; 5 TABLET, FILM COATED ORAL at 19:47

## 2021-05-17 RX ADMIN — LACTOBACILLUS ACIDOPH-L.BULGARICUS 1 MILLION CELL CHEWABLE TABLET SCH TAB: at 19:47

## 2021-05-18 LAB
ANION GAP SERPL CALC-SCNC: 10 MMOL/L (ref 10–20)
BUN SERPL-MCNC: 9 MG/DL (ref 9.8–20.1)
CALCIUM SERPL-MCNC: 7.8 MG/DL (ref 7.8–10.44)
CHLORIDE SERPL-SCNC: 114 MMOL/L (ref 98–107)
CO2 SERPL-SCNC: 25 MMOL/L (ref 23–31)
CREAT CL PREDICTED SERPL C-G-VRATE: 109 ML/MIN (ref 70–130)
GLUCOSE SERPL-MCNC: 101 MG/DL (ref 83–110)
MAGNESIUM SERPL-MCNC: 1.7 MG/DL (ref 1.6–2.6)
MAGNESIUM SERPL-MCNC: 1.7 MG/DL (ref 1.6–2.6)
POTASSIUM SERPL-SCNC: 3.1 MMOL/L (ref 3.5–5.1)
POTASSIUM SERPL-SCNC: 3.5 MMOL/L (ref 3.5–5.1)
SODIUM SERPL-SCNC: 146 MMOL/L (ref 136–145)

## 2021-05-18 RX ADMIN — PANTOPRAZOLE SODIUM SCH MG: 40 GRANULE, DELAYED RELEASE ORAL at 22:28

## 2021-05-18 RX ADMIN — MEROPENEM AND SODIUM CHLORIDE SCH MLS: 1 INJECTION, SOLUTION INTRAVENOUS at 14:52

## 2021-05-18 RX ADMIN — Medication SCH UNITS: at 22:27

## 2021-05-18 RX ADMIN — DOCUSATE SODIUM 50 MG AND SENNOSIDES 8.6 MG SCH TAB: 8.6; 5 TABLET, FILM COATED ORAL at 10:20

## 2021-05-18 RX ADMIN — POTASSIUM CHLORIDE SCH MLS: 14.9 INJECTION, SOLUTION INTRAVENOUS at 18:30

## 2021-05-18 RX ADMIN — MEROPENEM AND SODIUM CHLORIDE SCH MLS: 1 INJECTION, SOLUTION INTRAVENOUS at 23:27

## 2021-05-18 RX ADMIN — POTASSIUM CHLORIDE SCH MLS: 29.8 INJECTION, SOLUTION INTRAVENOUS at 10:19

## 2021-05-18 RX ADMIN — DOCUSATE SODIUM 50 MG AND SENNOSIDES 8.6 MG SCH TAB: 8.6; 5 TABLET, FILM COATED ORAL at 22:28

## 2021-05-18 RX ADMIN — LACTOBACILLUS ACIDOPH-L.BULGARICUS 1 MILLION CELL CHEWABLE TABLET SCH TAB: at 22:27

## 2021-05-18 RX ADMIN — MEROPENEM AND SODIUM CHLORIDE SCH MLS: 1 INJECTION, SOLUTION INTRAVENOUS at 05:09

## 2021-05-18 RX ADMIN — POTASSIUM CHLORIDE SCH: 29.8 INJECTION, SOLUTION INTRAVENOUS at 12:51

## 2021-05-18 RX ADMIN — POTASSIUM CHLORIDE SCH MLS: 14.9 INJECTION, SOLUTION INTRAVENOUS at 21:55

## 2021-05-19 LAB
ANION GAP SERPL CALC-SCNC: 11 MMOL/L (ref 10–20)
BASOPHILS # BLD AUTO: 0 THOU/UL (ref 0–0.2)
BASOPHILS NFR BLD AUTO: 0.1 % (ref 0–1)
BUN SERPL-MCNC: 8 MG/DL (ref 9.8–20.1)
CALCIUM SERPL-MCNC: 7.8 MG/DL (ref 7.8–10.44)
CHLORIDE SERPL-SCNC: 113 MMOL/L (ref 98–107)
CO2 SERPL-SCNC: 24 MMOL/L (ref 23–31)
CREAT CL PREDICTED SERPL C-G-VRATE: 114 ML/MIN (ref 70–130)
EOSINOPHIL # BLD AUTO: 0.1 THOU/UL (ref 0–0.7)
EOSINOPHIL NFR BLD AUTO: 1.8 % (ref 0–10)
GLUCOSE SERPL-MCNC: 87 MG/DL (ref 83–110)
HGB BLD-MCNC: 11.3 G/DL (ref 12–16)
LYMPHOCYTES # BLD: 1.2 THOU/UL (ref 1.2–3.4)
LYMPHOCYTES NFR BLD AUTO: 16.3 % (ref 21–51)
MCH RBC QN AUTO: 31 PG (ref 27–31)
MCV RBC AUTO: 96.1 FL (ref 78–98)
MONOCYTES # BLD AUTO: 0.7 THOU/UL (ref 0.11–0.59)
MONOCYTES NFR BLD AUTO: 9.1 % (ref 0–10)
NEUTROPHILS # BLD AUTO: 5.3 THOU/UL (ref 1.4–6.5)
NEUTROPHILS NFR BLD AUTO: 72.8 % (ref 42–75)
PLATELET # BLD AUTO: 185 THOU/UL (ref 130–400)
POTASSIUM SERPL-SCNC: 3.7 MMOL/L (ref 3.5–5.1)
RBC # BLD AUTO: 3.64 MILL/UL (ref 4.2–5.4)
SODIUM SERPL-SCNC: 144 MMOL/L (ref 136–145)
WBC # BLD AUTO: 7.3 THOU/UL (ref 4.8–10.8)

## 2021-05-19 RX ADMIN — MEROPENEM AND SODIUM CHLORIDE SCH MLS: 1 INJECTION, SOLUTION INTRAVENOUS at 14:28

## 2021-05-19 RX ADMIN — Medication PRN ML: at 10:00

## 2021-05-19 RX ADMIN — DOCUSATE SODIUM 50 MG AND SENNOSIDES 8.6 MG SCH TAB: 8.6; 5 TABLET, FILM COATED ORAL at 21:25

## 2021-05-19 RX ADMIN — Medication PRN ML: at 14:28

## 2021-05-19 RX ADMIN — PANTOPRAZOLE SODIUM SCH MG: 40 GRANULE, DELAYED RELEASE ORAL at 21:25

## 2021-05-19 RX ADMIN — DOCUSATE SODIUM 50 MG AND SENNOSIDES 8.6 MG SCH: 8.6; 5 TABLET, FILM COATED ORAL at 09:57

## 2021-05-19 RX ADMIN — MEROPENEM AND SODIUM CHLORIDE SCH MLS: 1 INJECTION, SOLUTION INTRAVENOUS at 05:39

## 2021-05-19 RX ADMIN — LACTOBACILLUS ACIDOPH-L.BULGARICUS 1 MILLION CELL CHEWABLE TABLET SCH TAB: at 21:23

## 2021-05-19 RX ADMIN — MEROPENEM AND SODIUM CHLORIDE SCH MLS: 1 INJECTION, SOLUTION INTRAVENOUS at 21:26

## 2021-05-19 RX ADMIN — Medication SCH UNITS: at 21:23

## 2021-05-20 LAB
HGB BLD-MCNC: 11.2 G/DL (ref 12–16)
MCH RBC QN AUTO: 31.1 PG (ref 27–31)
MCV RBC AUTO: 94.5 FL (ref 78–98)
MDIFF COMPLETE?: YES
PLATELET # BLD AUTO: 189 THOU/UL (ref 130–400)
RBC # BLD AUTO: 3.6 MILL/UL (ref 4.2–5.4)
WBC # BLD AUTO: 6.6 THOU/UL (ref 4.8–10.8)

## 2021-05-20 RX ADMIN — DOCUSATE SODIUM 50 MG AND SENNOSIDES 8.6 MG SCH TAB: 8.6; 5 TABLET, FILM COATED ORAL at 10:02

## 2021-05-20 RX ADMIN — DOCUSATE SODIUM 50 MG AND SENNOSIDES 8.6 MG SCH TAB: 8.6; 5 TABLET, FILM COATED ORAL at 20:20

## 2021-05-20 RX ADMIN — LACTOBACILLUS ACIDOPH-L.BULGARICUS 1 MILLION CELL CHEWABLE TABLET SCH TAB: at 20:20

## 2021-05-20 RX ADMIN — MEROPENEM AND SODIUM CHLORIDE SCH MLS: 1 INJECTION, SOLUTION INTRAVENOUS at 06:32

## 2021-05-20 RX ADMIN — MEROPENEM AND SODIUM CHLORIDE SCH MLS: 1 INJECTION, SOLUTION INTRAVENOUS at 21:01

## 2021-05-20 RX ADMIN — PANTOPRAZOLE SODIUM SCH MG: 40 GRANULE, DELAYED RELEASE ORAL at 20:20

## 2021-05-20 RX ADMIN — MEROPENEM AND SODIUM CHLORIDE SCH MLS: 1 INJECTION, SOLUTION INTRAVENOUS at 13:26

## 2021-05-20 RX ADMIN — Medication SCH UNITS: at 20:20

## 2021-05-21 ENCOUNTER — HOSPITAL ENCOUNTER (INPATIENT)
Dept: HOSPITAL 18 - NAV ACUTE | Age: 76
LOS: 14 days | Discharge: HOME HEALTH | DRG: 872 | End: 2021-06-04
Attending: STUDENT IN AN ORGANIZED HEALTH CARE EDUCATION/TRAINING PROGRAM | Admitting: STUDENT IN AN ORGANIZED HEALTH CARE EDUCATION/TRAINING PROGRAM
Payer: MEDICARE

## 2021-05-21 VITALS — DIASTOLIC BLOOD PRESSURE: 74 MMHG | TEMPERATURE: 98.4 F | SYSTOLIC BLOOD PRESSURE: 121 MMHG

## 2021-05-21 VITALS — BODY MASS INDEX: 36.2 KG/M2

## 2021-05-21 DIAGNOSIS — F41.9: ICD-10-CM

## 2021-05-21 DIAGNOSIS — Z98.890: ICD-10-CM

## 2021-05-21 DIAGNOSIS — T17.900A: ICD-10-CM

## 2021-05-21 DIAGNOSIS — F32.9: ICD-10-CM

## 2021-05-21 DIAGNOSIS — Z90.710: ICD-10-CM

## 2021-05-21 DIAGNOSIS — J44.9: ICD-10-CM

## 2021-05-21 DIAGNOSIS — Z20.822: ICD-10-CM

## 2021-05-21 DIAGNOSIS — R53.81: ICD-10-CM

## 2021-05-21 DIAGNOSIS — G81.11: ICD-10-CM

## 2021-05-21 DIAGNOSIS — Z86.79: ICD-10-CM

## 2021-05-21 DIAGNOSIS — K52.89: ICD-10-CM

## 2021-05-21 DIAGNOSIS — I10: ICD-10-CM

## 2021-05-21 DIAGNOSIS — Z79.899: ICD-10-CM

## 2021-05-21 DIAGNOSIS — I25.2: ICD-10-CM

## 2021-05-21 DIAGNOSIS — A41.51: Primary | ICD-10-CM
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
## 2021-05-21 DIAGNOSIS — K59.09: ICD-10-CM

## 2021-05-21 DIAGNOSIS — N20.1: ICD-10-CM

## 2021-05-21 DIAGNOSIS — A41.89: ICD-10-CM

## 2021-05-21 DIAGNOSIS — N12: ICD-10-CM

## 2021-05-21 DIAGNOSIS — E78.5: ICD-10-CM

## 2021-05-21 LAB
ANION GAP SERPL CALC-SCNC: 12 MMOL/L (ref 10–20)
BASOPHILS # BLD AUTO: 0 THOU/UL (ref 0–0.2)
BASOPHILS NFR BLD AUTO: 0.2 % (ref 0–1)
BUN SERPL-MCNC: 8 MG/DL (ref 9.8–20.1)
CALCIUM SERPL-MCNC: 8.1 MG/DL (ref 7.8–10.44)
CHLORIDE SERPL-SCNC: 107 MMOL/L (ref 98–107)
CO2 SERPL-SCNC: 26 MMOL/L (ref 23–31)
CREAT CL PREDICTED SERPL C-G-VRATE: 121 ML/MIN (ref 70–130)
EOSINOPHIL # BLD AUTO: 0.2 THOU/UL (ref 0–0.7)
EOSINOPHIL NFR BLD AUTO: 2.2 % (ref 0–10)
GLUCOSE SERPL-MCNC: 94 MG/DL (ref 83–110)
HGB BLD-MCNC: 12 G/DL (ref 12–16)
LYMPHOCYTES # BLD: 1.5 THOU/UL (ref 1.2–3.4)
LYMPHOCYTES NFR BLD AUTO: 17.6 % (ref 21–51)
MAGNESIUM SERPL-MCNC: 1.6 MG/DL (ref 1.6–2.6)
MCH RBC QN AUTO: 31.9 PG (ref 27–31)
MCV RBC AUTO: 95 FL (ref 78–98)
MONOCYTES # BLD AUTO: 0.9 THOU/UL (ref 0.11–0.59)
MONOCYTES NFR BLD AUTO: 10.5 % (ref 0–10)
NEUTROPHILS # BLD AUTO: 5.8 THOU/UL (ref 1.4–6.5)
NEUTROPHILS NFR BLD AUTO: 69.6 % (ref 42–75)
PLATELET # BLD AUTO: 219 THOU/UL (ref 130–400)
POTASSIUM SERPL-SCNC: 3.4 MMOL/L (ref 3.5–5.1)
RBC # BLD AUTO: 3.77 MILL/UL (ref 4.2–5.4)
SODIUM SERPL-SCNC: 142 MMOL/L (ref 136–145)
WBC # BLD AUTO: 8.4 THOU/UL (ref 4.8–10.8)

## 2021-05-21 PROCEDURE — 85025 COMPLETE CBC W/AUTO DIFF WBC: CPT

## 2021-05-21 PROCEDURE — 02HV33Z INSERTION OF INFUSION DEVICE INTO SUPERIOR VENA CAVA, PERCUTANEOUS APPROACH: ICD-10-PCS | Performed by: RADIOLOGY

## 2021-05-21 PROCEDURE — 80053 COMPREHEN METABOLIC PANEL: CPT

## 2021-05-21 PROCEDURE — 80048 BASIC METABOLIC PNL TOTAL CA: CPT

## 2021-05-21 PROCEDURE — 0240U: CPT

## 2021-05-21 PROCEDURE — 87086 URINE CULTURE/COLONY COUNT: CPT

## 2021-05-21 PROCEDURE — 36415 COLL VENOUS BLD VENIPUNCTURE: CPT

## 2021-05-21 RX ADMIN — MEROPENEM AND SODIUM CHLORIDE SCH MLS: 1 INJECTION, SOLUTION INTRAVENOUS at 21:07

## 2021-05-21 RX ADMIN — LACTOBACILLUS ACIDOPH-L.BULGARICUS 1 MILLION CELL CHEWABLE TABLET SCH TAB: at 20:52

## 2021-05-21 RX ADMIN — DOCUSATE SODIUM 50 MG AND SENNOSIDES 8.6 MG SCH TAB: 8.6; 5 TABLET, FILM COATED ORAL at 08:08

## 2021-05-21 RX ADMIN — MEROPENEM AND SODIUM CHLORIDE SCH MLS: 1 INJECTION, SOLUTION INTRAVENOUS at 05:45

## 2021-05-21 RX ADMIN — DOCUSATE SODIUM 50 MG AND SENNOSIDES 8.6 MG SCH TAB: 8.6; 5 TABLET, FILM COATED ORAL at 20:52

## 2021-05-21 RX ADMIN — Medication SCH UNITS: at 20:52

## 2021-05-21 RX ADMIN — MEROPENEM AND SODIUM CHLORIDE SCH MLS: 1 INJECTION, SOLUTION INTRAVENOUS at 13:20

## 2021-05-21 RX ADMIN — PANTOPRAZOLE SODIUM SCH MG: 40 GRANULE, DELAYED RELEASE ORAL at 20:53

## 2021-05-22 LAB
ANION GAP SERPL CALC-SCNC: 14 MMOL/L (ref 10–20)
BASOPHILS # BLD AUTO: 0.1 THOU/UL (ref 0–0.2)
BASOPHILS NFR BLD AUTO: 1 % (ref 0–1)
BUN SERPL-MCNC: 7 MG/DL (ref 9.8–20.1)
CALCIUM SERPL-MCNC: 7.8 MG/DL (ref 7.8–10.44)
CHLORIDE SERPL-SCNC: 108 MMOL/L (ref 98–107)
CO2 SERPL-SCNC: 23 MMOL/L (ref 23–31)
CREAT CL PREDICTED SERPL C-G-VRATE: 129 ML/MIN (ref 70–130)
EOSINOPHIL # BLD AUTO: 0.2 THOU/UL (ref 0–0.7)
EOSINOPHIL NFR BLD AUTO: 2 % (ref 0–10)
GLUCOSE SERPL-MCNC: 133 MG/DL (ref 83–110)
HGB BLD-MCNC: 12.6 G/DL (ref 12–16)
LYMPHOCYTES # BLD AUTO: 1.3 THOU/UL (ref 1.2–3.4)
LYMPHOCYTES NFR BLD AUTO: 17.5 % (ref 21–51)
MCH RBC QN AUTO: 29.6 PG (ref 27–31)
MCV RBC AUTO: 98.6 FL (ref 78–98)
MONOCYTES # BLD AUTO: 0.7 THOU/UL (ref 0.11–0.59)
MONOCYTES NFR BLD AUTO: 8.6 % (ref 0–10)
NEUTROPHILS # BLD AUTO: 5.3 THOU/UL (ref 1.4–6.5)
NEUTROPHILS NFR BLD AUTO: 70.8 % (ref 42–75)
PLATELET # BLD AUTO: 146 THOU/UL (ref 130–400)
POTASSIUM SERPL-SCNC: 3.5 MMOL/L (ref 3.5–5.1)
RBC # BLD AUTO: 4.25 MILL/UL (ref 4.2–5.4)
SODIUM SERPL-SCNC: 141 MMOL/L (ref 136–145)
WBC # BLD AUTO: 7.5 THOU/UL (ref 4.8–10.8)

## 2021-05-22 RX ADMIN — DOCUSATE SODIUM 50 MG AND SENNOSIDES 8.6 MG SCH TAB: 8.6; 5 TABLET, FILM COATED ORAL at 21:52

## 2021-05-22 RX ADMIN — LACTOBACILLUS ACIDOPH-L.BULGARICUS 1 MILLION CELL CHEWABLE TABLET SCH TAB: at 21:51

## 2021-05-22 RX ADMIN — PANTOPRAZOLE SODIUM SCH MG: 40 GRANULE, DELAYED RELEASE ORAL at 21:53

## 2021-05-22 RX ADMIN — Medication SCH UNITS: at 21:51

## 2021-05-22 RX ADMIN — DOCUSATE SODIUM 50 MG AND SENNOSIDES 8.6 MG SCH TAB: 8.6; 5 TABLET, FILM COATED ORAL at 10:34

## 2021-05-23 RX ADMIN — PANTOPRAZOLE SODIUM SCH MG: 40 GRANULE, DELAYED RELEASE ORAL at 21:16

## 2021-05-23 RX ADMIN — LACTOBACILLUS ACIDOPH-L.BULGARICUS 1 MILLION CELL CHEWABLE TABLET SCH TAB: at 21:14

## 2021-05-23 RX ADMIN — DOCUSATE SODIUM 50 MG AND SENNOSIDES 8.6 MG SCH TAB: 8.6; 5 TABLET, FILM COATED ORAL at 21:15

## 2021-05-23 RX ADMIN — DOCUSATE SODIUM 50 MG AND SENNOSIDES 8.6 MG SCH TAB: 8.6; 5 TABLET, FILM COATED ORAL at 09:14

## 2021-05-23 RX ADMIN — Medication SCH UNITS: at 21:15

## 2021-05-23 RX ADMIN — Medication PRN ML: at 17:27

## 2021-05-23 RX ADMIN — Medication PRN ML: at 10:41

## 2021-05-24 RX ADMIN — LACTOBACILLUS ACIDOPH-L.BULGARICUS 1 MILLION CELL CHEWABLE TABLET SCH TAB: at 21:10

## 2021-05-24 RX ADMIN — DOCUSATE SODIUM 50 MG AND SENNOSIDES 8.6 MG SCH TAB: 8.6; 5 TABLET, FILM COATED ORAL at 08:41

## 2021-05-24 RX ADMIN — Medication SCH UNITS: at 21:10

## 2021-05-24 RX ADMIN — DOCUSATE SODIUM 50 MG AND SENNOSIDES 8.6 MG SCH TAB: 8.6; 5 TABLET, FILM COATED ORAL at 21:10

## 2021-05-25 RX ADMIN — LACTOBACILLUS ACIDOPH-L.BULGARICUS 1 MILLION CELL CHEWABLE TABLET SCH TAB: at 21:04

## 2021-05-25 RX ADMIN — DOCUSATE SODIUM 50 MG AND SENNOSIDES 8.6 MG SCH TAB: 8.6; 5 TABLET, FILM COATED ORAL at 21:04

## 2021-05-25 RX ADMIN — Medication SCH UNITS: at 21:04

## 2021-05-25 RX ADMIN — DOCUSATE SODIUM 50 MG AND SENNOSIDES 8.6 MG SCH TAB: 8.6; 5 TABLET, FILM COATED ORAL at 09:11

## 2021-05-25 RX ADMIN — Medication PRN ML: at 00:54

## 2021-05-26 RX ADMIN — LACTOBACILLUS ACIDOPH-L.BULGARICUS 1 MILLION CELL CHEWABLE TABLET SCH TAB: at 20:34

## 2021-05-26 RX ADMIN — DOCUSATE SODIUM 50 MG AND SENNOSIDES 8.6 MG SCH TAB: 8.6; 5 TABLET, FILM COATED ORAL at 09:13

## 2021-05-26 RX ADMIN — DOCUSATE SODIUM 50 MG AND SENNOSIDES 8.6 MG SCH TAB: 8.6; 5 TABLET, FILM COATED ORAL at 20:35

## 2021-05-26 RX ADMIN — Medication SCH UNITS: at 20:35

## 2021-05-27 RX ADMIN — Medication SCH UNITS: at 20:49

## 2021-05-27 RX ADMIN — Medication PRN ML: at 10:05

## 2021-05-27 RX ADMIN — Medication PRN ML: at 17:23

## 2021-05-27 RX ADMIN — LACTOBACILLUS ACIDOPH-L.BULGARICUS 1 MILLION CELL CHEWABLE TABLET SCH TAB: at 20:48

## 2021-05-27 RX ADMIN — DOCUSATE SODIUM 50 MG AND SENNOSIDES 8.6 MG SCH TAB: 8.6; 5 TABLET, FILM COATED ORAL at 09:28

## 2021-05-27 RX ADMIN — DOCUSATE SODIUM 50 MG AND SENNOSIDES 8.6 MG SCH TAB: 8.6; 5 TABLET, FILM COATED ORAL at 20:49

## 2021-05-28 RX ADMIN — DOCUSATE SODIUM 50 MG AND SENNOSIDES 8.6 MG SCH TAB: 8.6; 5 TABLET, FILM COATED ORAL at 09:31

## 2021-05-28 RX ADMIN — Medication SCH UNITS: at 21:58

## 2021-05-28 RX ADMIN — DOCUSATE SODIUM 50 MG AND SENNOSIDES 8.6 MG SCH TAB: 8.6; 5 TABLET, FILM COATED ORAL at 21:58

## 2021-05-28 RX ADMIN — LACTOBACILLUS ACIDOPH-L.BULGARICUS 1 MILLION CELL CHEWABLE TABLET SCH TAB: at 21:58

## 2021-05-29 RX ADMIN — Medication SCH UNITS: at 20:56

## 2021-05-29 RX ADMIN — DOCUSATE SODIUM 50 MG AND SENNOSIDES 8.6 MG SCH: 8.6; 5 TABLET, FILM COATED ORAL at 09:34

## 2021-05-29 RX ADMIN — DOCUSATE SODIUM 50 MG AND SENNOSIDES 8.6 MG SCH: 8.6; 5 TABLET, FILM COATED ORAL at 20:57

## 2021-05-29 RX ADMIN — Medication PRN ML: at 09:35

## 2021-05-29 RX ADMIN — LACTOBACILLUS ACIDOPH-L.BULGARICUS 1 MILLION CELL CHEWABLE TABLET SCH TAB: at 20:55

## 2021-05-30 RX ADMIN — Medication SCH UNITS: at 20:57

## 2021-05-30 RX ADMIN — LACTOBACILLUS ACIDOPH-L.BULGARICUS 1 MILLION CELL CHEWABLE TABLET SCH TAB: at 20:57

## 2021-05-30 RX ADMIN — DOCUSATE SODIUM 50 MG AND SENNOSIDES 8.6 MG SCH: 8.6; 5 TABLET, FILM COATED ORAL at 20:58

## 2021-05-30 RX ADMIN — DOCUSATE SODIUM 50 MG AND SENNOSIDES 8.6 MG SCH: 8.6; 5 TABLET, FILM COATED ORAL at 09:50

## 2021-05-30 RX ADMIN — Medication PRN ML: at 17:47

## 2021-05-31 RX ADMIN — LACTOBACILLUS ACIDOPH-L.BULGARICUS 1 MILLION CELL CHEWABLE TABLET SCH TAB: at 21:11

## 2021-05-31 RX ADMIN — Medication PRN ML: at 10:20

## 2021-05-31 RX ADMIN — Medication SCH UNITS: at 21:12

## 2021-05-31 RX ADMIN — DOCUSATE SODIUM 50 MG AND SENNOSIDES 8.6 MG SCH: 8.6; 5 TABLET, FILM COATED ORAL at 21:13

## 2021-05-31 RX ADMIN — DOCUSATE SODIUM 50 MG AND SENNOSIDES 8.6 MG SCH TAB: 8.6; 5 TABLET, FILM COATED ORAL at 10:19

## 2021-06-01 LAB
ALBUMIN SERPL BCG-MCNC: 3.1 G/DL (ref 3.4–4.8)
ALP SERPL-CCNC: 122 U/L (ref 40–110)
ALT SERPL W P-5'-P-CCNC: 9 U/L (ref 8–55)
ANION GAP SERPL CALC-SCNC: 11 MMOL/L (ref 10–20)
AST SERPL-CCNC: 13 U/L (ref 5–34)
BASOPHILS # BLD AUTO: 0.1 THOU/UL (ref 0–0.2)
BASOPHILS NFR BLD AUTO: 1.2 % (ref 0–1)
BILIRUB SERPL-MCNC: 0.5 MG/DL (ref 0.2–1.2)
BUN SERPL-MCNC: 12 MG/DL (ref 9.8–20.1)
CALCIUM SERPL-MCNC: 8.4 MG/DL (ref 7.8–10.44)
CHLORIDE SERPL-SCNC: 103 MMOL/L (ref 98–107)
CO2 SERPL-SCNC: 29 MMOL/L (ref 23–31)
CREAT CL PREDICTED SERPL C-G-VRATE: 127 ML/MIN (ref 70–130)
EOSINOPHIL # BLD AUTO: 0.1 THOU/UL (ref 0–0.7)
EOSINOPHIL NFR BLD AUTO: 1.8 % (ref 0–10)
GLOBULIN SER CALC-MCNC: 3.8 G/DL (ref 2.4–3.5)
GLUCOSE SERPL-MCNC: 98 MG/DL (ref 83–110)
HGB BLD-MCNC: 12 G/DL (ref 12–16)
LYMPHOCYTES # BLD AUTO: 1.5 THOU/UL (ref 1.2–3.4)
LYMPHOCYTES NFR BLD AUTO: 26 % (ref 21–51)
MCH RBC QN AUTO: 29.3 PG (ref 27–31)
MCV RBC AUTO: 96.6 FL (ref 78–98)
MONOCYTES # BLD AUTO: 0.6 THOU/UL (ref 0.11–0.59)
MONOCYTES NFR BLD AUTO: 9.9 % (ref 0–10)
NEUTROPHILS # BLD AUTO: 3.6 THOU/UL (ref 1.4–6.5)
NEUTROPHILS NFR BLD AUTO: 61.1 % (ref 42–75)
PLATELET # BLD AUTO: 314 THOU/UL (ref 130–400)
POTASSIUM SERPL-SCNC: 4.4 MMOL/L (ref 3.5–5.1)
RBC # BLD AUTO: 4.1 MILL/UL (ref 4.2–5.4)
SODIUM SERPL-SCNC: 139 MMOL/L (ref 136–145)
WBC # BLD AUTO: 5.8 THOU/UL (ref 4.8–10.8)

## 2021-06-01 RX ADMIN — DOCUSATE SODIUM 50 MG AND SENNOSIDES 8.6 MG SCH TAB: 8.6; 5 TABLET, FILM COATED ORAL at 09:47

## 2021-06-01 RX ADMIN — Medication PRN ML: at 09:48

## 2021-06-01 RX ADMIN — LACTOBACILLUS ACIDOPH-L.BULGARICUS 1 MILLION CELL CHEWABLE TABLET SCH TAB: at 20:39

## 2021-06-01 RX ADMIN — DOCUSATE SODIUM 50 MG AND SENNOSIDES 8.6 MG SCH TAB: 8.6; 5 TABLET, FILM COATED ORAL at 20:38

## 2021-06-01 RX ADMIN — Medication SCH UNITS: at 20:38

## 2021-06-02 RX ADMIN — Medication SCH UNITS: at 20:35

## 2021-06-02 RX ADMIN — DOCUSATE SODIUM 50 MG AND SENNOSIDES 8.6 MG SCH: 8.6; 5 TABLET, FILM COATED ORAL at 20:38

## 2021-06-02 RX ADMIN — LACTOBACILLUS ACIDOPH-L.BULGARICUS 1 MILLION CELL CHEWABLE TABLET SCH TAB: at 20:36

## 2021-06-02 RX ADMIN — DOCUSATE SODIUM 50 MG AND SENNOSIDES 8.6 MG SCH TAB: 8.6; 5 TABLET, FILM COATED ORAL at 09:24

## 2021-06-03 ENCOUNTER — HOSPITAL ENCOUNTER (OUTPATIENT)
Dept: HOSPITAL 92 - SDC | Age: 76
Discharge: ANOTHER HOSPITAL | End: 2021-06-03
Attending: UROLOGY
Payer: MEDICARE

## 2021-06-03 VITALS — BODY MASS INDEX: 34.9 KG/M2

## 2021-06-03 DIAGNOSIS — J44.9: ICD-10-CM

## 2021-06-03 DIAGNOSIS — K21.9: ICD-10-CM

## 2021-06-03 DIAGNOSIS — I11.0: ICD-10-CM

## 2021-06-03 DIAGNOSIS — Z79.899: ICD-10-CM

## 2021-06-03 DIAGNOSIS — N20.2: Primary | ICD-10-CM

## 2021-06-03 DIAGNOSIS — I50.9: ICD-10-CM

## 2021-06-03 PROCEDURE — 0TC48ZZ EXTIRPATION OF MATTER FROM LEFT KIDNEY PELVIS, VIA NATURAL OR ARTIFICIAL OPENING ENDOSCOPIC: ICD-10-PCS | Performed by: UROLOGY

## 2021-06-03 PROCEDURE — 88300 SURGICAL PATH GROSS: CPT

## 2021-06-03 PROCEDURE — 52356 CYSTO/URETERO W/LITHOTRIPSY: CPT

## 2021-06-03 PROCEDURE — 0TC78ZZ EXTIRPATION OF MATTER FROM LEFT URETER, VIA NATURAL OR ARTIFICIAL OPENING ENDOSCOPIC: ICD-10-PCS | Performed by: UROLOGY

## 2021-06-03 PROCEDURE — C2617 STENT, NON-COR, TEM W/O DEL: HCPCS

## 2021-06-03 PROCEDURE — 0T788DZ DILATION OF BILATERAL URETERS WITH INTRALUMINAL DEVICE, VIA NATURAL OR ARTIFICIAL OPENING ENDOSCOPIC: ICD-10-PCS | Performed by: UROLOGY

## 2021-06-03 PROCEDURE — 74420 UROGRAPHY RTRGR +-KUB: CPT

## 2021-06-03 PROCEDURE — 0TC38ZZ EXTIRPATION OF MATTER FROM RIGHT KIDNEY PELVIS, VIA NATURAL OR ARTIFICIAL OPENING ENDOSCOPIC: ICD-10-PCS | Performed by: UROLOGY

## 2021-06-03 PROCEDURE — 82365 CALCULUS SPECTROSCOPY: CPT

## 2021-06-03 RX ADMIN — Medication PRN ML: at 17:56

## 2021-06-03 RX ADMIN — Medication PRN ML: at 08:42

## 2021-06-03 RX ADMIN — LACTOBACILLUS ACIDOPH-L.BULGARICUS 1 MILLION CELL CHEWABLE TABLET SCH TAB: at 21:00

## 2021-06-03 RX ADMIN — DOCUSATE SODIUM 50 MG AND SENNOSIDES 8.6 MG SCH TAB: 8.6; 5 TABLET, FILM COATED ORAL at 21:01

## 2021-06-03 RX ADMIN — Medication SCH UNITS: at 21:01

## 2021-06-03 RX ADMIN — DOCUSATE SODIUM 50 MG AND SENNOSIDES 8.6 MG SCH: 8.6; 5 TABLET, FILM COATED ORAL at 10:17

## 2021-06-04 VITALS — DIASTOLIC BLOOD PRESSURE: 59 MMHG | TEMPERATURE: 97.8 F | SYSTOLIC BLOOD PRESSURE: 106 MMHG

## 2021-06-04 RX ADMIN — DOCUSATE SODIUM 50 MG AND SENNOSIDES 8.6 MG SCH TAB: 8.6; 5 TABLET, FILM COATED ORAL at 08:46

## 2021-09-03 ENCOUNTER — HOSPITAL ENCOUNTER (EMERGENCY)
Dept: HOSPITAL 18 - NAV ERS | Age: 76
LOS: 1 days | Discharge: HOME | End: 2021-09-04
Payer: MEDICARE

## 2021-09-03 DIAGNOSIS — N39.0: ICD-10-CM

## 2021-09-03 DIAGNOSIS — E78.5: ICD-10-CM

## 2021-09-03 DIAGNOSIS — U07.1: Primary | ICD-10-CM

## 2021-09-03 DIAGNOSIS — I10: ICD-10-CM

## 2021-09-03 DIAGNOSIS — Z79.899: ICD-10-CM

## 2021-09-03 DIAGNOSIS — K21.9: ICD-10-CM

## 2021-09-03 LAB
ALBUMIN SERPL BCG-MCNC: 3.5 G/DL (ref 3.4–4.8)
ALP SERPL-CCNC: 92 U/L (ref 40–110)
ALT SERPL W P-5'-P-CCNC: 13 U/L (ref 8–55)
ANION GAP SERPL CALC-SCNC: 14 MMOL/L (ref 10–20)
AST SERPL-CCNC: 19 U/L (ref 5–34)
BASOPHILS # BLD AUTO: 0.1 THOU/UL (ref 0–0.2)
BASOPHILS NFR BLD AUTO: 1.2 % (ref 0–1)
BILIRUB SERPL-MCNC: 0.4 MG/DL (ref 0.2–1.2)
BUN SERPL-MCNC: 13 MG/DL (ref 9.8–20.1)
CALCIUM SERPL-MCNC: 8.7 MG/DL (ref 7.8–10.44)
CHLORIDE SERPL-SCNC: 104 MMOL/L (ref 98–107)
CO2 SERPL-SCNC: 24 MMOL/L (ref 23–31)
CREAT CL PREDICTED SERPL C-G-VRATE: 0 ML/MIN (ref 70–130)
EOSINOPHIL # BLD AUTO: 0 THOU/UL (ref 0–0.7)
EOSINOPHIL NFR BLD AUTO: 0.3 % (ref 0–10)
GLOBULIN SER CALC-MCNC: 4 G/DL (ref 2.4–3.5)
GLUCOSE SERPL-MCNC: 100 MG/DL (ref 83–110)
HGB BLD-MCNC: 13.1 G/DL (ref 12–16)
LYMPHOCYTES # BLD AUTO: 1 THOU/UL (ref 1.2–3.4)
LYMPHOCYTES NFR BLD AUTO: 21.5 % (ref 21–51)
MCH RBC QN AUTO: 30.7 PG (ref 27–31)
MCV RBC AUTO: 94.7 FL (ref 78–98)
MONOCYTES # BLD AUTO: 0.7 THOU/UL (ref 0.11–0.59)
MONOCYTES NFR BLD AUTO: 13.9 % (ref 0–10)
NEUTROPHILS # BLD AUTO: 3 THOU/UL (ref 1.4–6.5)
NEUTROPHILS NFR BLD AUTO: 63.1 % (ref 42–75)
PLATELET # BLD AUTO: 215 THOU/UL (ref 130–400)
POTASSIUM SERPL-SCNC: 3.7 MMOL/L (ref 3.5–5.1)
RBC # BLD AUTO: 4.28 MILL/UL (ref 4.2–5.4)
SODIUM SERPL-SCNC: 138 MMOL/L (ref 136–145)
WBC # BLD AUTO: 4.8 THOU/UL (ref 4.8–10.8)

## 2021-09-03 PROCEDURE — U0005 INFEC AGEN DETEC AMPLI PROBE: HCPCS

## 2021-09-03 PROCEDURE — 81015 MICROSCOPIC EXAM OF URINE: CPT

## 2021-09-03 PROCEDURE — U0003 INFECTIOUS AGENT DETECTION BY NUCLEIC ACID (DNA OR RNA); SEVERE ACUTE RESPIRATORY SYNDROME CORONAVIRUS 2 (SARS-COV-2) (CORONAVIRUS DISEASE [COVID-19]), AMPLIFIED PROBE TECHNIQUE, MAKING USE OF HIGH THROUGHPUT TECHNOLOGIES AS DESCRIBED BY CMS-2020-01-R: HCPCS

## 2021-09-03 PROCEDURE — 85025 COMPLETE CBC W/AUTO DIFF WBC: CPT

## 2021-09-03 PROCEDURE — 83605 ASSAY OF LACTIC ACID: CPT

## 2021-09-03 PROCEDURE — 81003 URINALYSIS AUTO W/O SCOPE: CPT

## 2021-09-03 PROCEDURE — 80053 COMPREHEN METABOLIC PANEL: CPT

## 2021-09-03 PROCEDURE — 51701 INSERT BLADDER CATHETER: CPT

## 2021-09-03 PROCEDURE — 71045 X-RAY EXAM CHEST 1 VIEW: CPT

## 2021-09-05 LAB — SARS-COV-2 RNA RESP QL NAA+PROBE: DETECTED

## 2021-09-16 ENCOUNTER — HOSPITAL ENCOUNTER (EMERGENCY)
Dept: HOSPITAL 92 - ERS | Age: 76
Discharge: HOME | End: 2021-09-16
Payer: MEDICARE

## 2021-09-16 DIAGNOSIS — R91.8: ICD-10-CM

## 2021-09-16 DIAGNOSIS — Z87.442: ICD-10-CM

## 2021-09-16 DIAGNOSIS — I25.2: ICD-10-CM

## 2021-09-16 DIAGNOSIS — U07.1: Primary | ICD-10-CM

## 2021-09-16 DIAGNOSIS — Z79.899: ICD-10-CM

## 2021-09-16 DIAGNOSIS — N39.0: ICD-10-CM

## 2021-09-16 DIAGNOSIS — I10: ICD-10-CM

## 2021-09-16 DIAGNOSIS — E78.5: ICD-10-CM

## 2021-09-16 DIAGNOSIS — K21.9: ICD-10-CM

## 2021-09-16 LAB
ALBUMIN SERPL BCG-MCNC: 3.1 G/DL (ref 3.4–4.8)
ALP SERPL-CCNC: 82 U/L (ref 40–110)
ALT SERPL W P-5'-P-CCNC: 18 U/L (ref 8–55)
ANION GAP SERPL CALC-SCNC: 11 MMOL/L (ref 10–20)
AST SERPL-CCNC: 21 U/L (ref 5–34)
BACTERIA UR QL AUTO: (no result) HPF
BASOPHILS # BLD AUTO: 0 THOU/UL (ref 0–0.2)
BASOPHILS NFR BLD AUTO: 0.2 % (ref 0–1)
BILIRUB SERPL-MCNC: 0.6 MG/DL (ref 0.2–1.2)
BUN SERPL-MCNC: 11 MG/DL (ref 9.8–20.1)
CALCIUM SERPL-MCNC: 8 MG/DL (ref 7.8–10.44)
CHLORIDE SERPL-SCNC: 106 MMOL/L (ref 98–107)
CO2 SERPL-SCNC: 27 MMOL/L (ref 23–31)
CREAT CL PREDICTED SERPL C-G-VRATE: 0 ML/MIN (ref 70–130)
EOSINOPHIL # BLD AUTO: 0 THOU/UL (ref 0–0.7)
EOSINOPHIL NFR BLD AUTO: 0.3 % (ref 0–10)
GLOBULIN SER CALC-MCNC: 3.4 G/DL (ref 2.4–3.5)
GLUCOSE SERPL-MCNC: 114 MG/DL (ref 83–110)
HGB BLD-MCNC: 13.3 G/DL (ref 12–16)
LEUKOCYTE ESTERASE UR QL STRIP.AUTO: 500 LEU/UL
LYMPHOCYTES # BLD: 1.3 THOU/UL (ref 1.2–3.4)
LYMPHOCYTES NFR BLD AUTO: 30.4 % (ref 21–51)
MCH RBC QN AUTO: 32 PG (ref 27–31)
MCV RBC AUTO: 93.5 FL (ref 78–98)
MONOCYTES # BLD AUTO: 0.6 THOU/UL (ref 0.11–0.59)
MONOCYTES NFR BLD AUTO: 12.9 % (ref 0–10)
NEUTROPHILS # BLD AUTO: 2.5 THOU/UL (ref 1.4–6.5)
NEUTROPHILS NFR BLD AUTO: 56.1 % (ref 42–75)
PLATELET # BLD AUTO: 186 THOU/UL (ref 130–400)
POTASSIUM SERPL-SCNC: 3.3 MMOL/L (ref 3.5–5.1)
PROT UR STRIP.AUTO-MCNC: 200 MG/DL
RBC # BLD AUTO: 4.14 MILL/UL (ref 4.2–5.4)
SODIUM SERPL-SCNC: 141 MMOL/L (ref 136–145)
SP GR UR STRIP: 1.02 (ref 1–1.04)
WBC # BLD AUTO: 4.4 THOU/UL (ref 4.8–10.8)

## 2021-09-16 PROCEDURE — 85025 COMPLETE CBC W/AUTO DIFF WBC: CPT

## 2021-09-16 PROCEDURE — 80053 COMPREHEN METABOLIC PANEL: CPT

## 2021-09-16 PROCEDURE — 36415 COLL VENOUS BLD VENIPUNCTURE: CPT

## 2021-09-16 PROCEDURE — 83605 ASSAY OF LACTIC ACID: CPT

## 2021-09-16 PROCEDURE — 93005 ELECTROCARDIOGRAM TRACING: CPT

## 2021-09-16 PROCEDURE — 81003 URINALYSIS AUTO W/O SCOPE: CPT

## 2021-09-16 PROCEDURE — 71045 X-RAY EXAM CHEST 1 VIEW: CPT

## 2021-09-16 PROCEDURE — 84484 ASSAY OF TROPONIN QUANT: CPT

## 2021-09-16 PROCEDURE — 87040 BLOOD CULTURE FOR BACTERIA: CPT

## 2021-09-16 PROCEDURE — 87186 SC STD MICRODIL/AGAR DIL: CPT

## 2021-09-16 PROCEDURE — 87077 CULTURE AEROBIC IDENTIFY: CPT

## 2021-09-16 PROCEDURE — 81015 MICROSCOPIC EXAM OF URINE: CPT

## 2021-09-16 PROCEDURE — 87086 URINE CULTURE/COLONY COUNT: CPT

## 2021-09-27 ENCOUNTER — HOSPITAL ENCOUNTER (INPATIENT)
Dept: HOSPITAL 18 - NAV ERS | Age: 76
LOS: 3 days | Discharge: NURSING, REHAB OR LONG-TERM CARE | DRG: 690 | End: 2021-09-30
Attending: STUDENT IN AN ORGANIZED HEALTH CARE EDUCATION/TRAINING PROGRAM | Admitting: STUDENT IN AN ORGANIZED HEALTH CARE EDUCATION/TRAINING PROGRAM
Payer: MEDICARE

## 2021-09-27 VITALS — BODY MASS INDEX: 29.9 KG/M2

## 2021-09-27 DIAGNOSIS — I69.351: ICD-10-CM

## 2021-09-27 DIAGNOSIS — Z79.899: ICD-10-CM

## 2021-09-27 DIAGNOSIS — Z79.82: ICD-10-CM

## 2021-09-27 DIAGNOSIS — E66.9: ICD-10-CM

## 2021-09-27 DIAGNOSIS — N39.0: Primary | ICD-10-CM

## 2021-09-27 DIAGNOSIS — I10: ICD-10-CM

## 2021-09-27 DIAGNOSIS — D50.9: ICD-10-CM

## 2021-09-27 DIAGNOSIS — Z90.710: ICD-10-CM

## 2021-09-27 DIAGNOSIS — E78.00: ICD-10-CM

## 2021-09-27 DIAGNOSIS — K21.9: ICD-10-CM

## 2021-09-27 DIAGNOSIS — I69.311: ICD-10-CM

## 2021-09-27 DIAGNOSIS — J44.9: ICD-10-CM

## 2021-09-27 DIAGNOSIS — Z16.24: ICD-10-CM

## 2021-09-27 DIAGNOSIS — K59.00: ICD-10-CM

## 2021-09-27 DIAGNOSIS — B95.62: ICD-10-CM

## 2021-09-27 DIAGNOSIS — E78.5: ICD-10-CM

## 2021-09-27 DIAGNOSIS — Z86.16: ICD-10-CM

## 2021-09-27 DIAGNOSIS — I25.2: ICD-10-CM

## 2021-09-27 DIAGNOSIS — F32.9: ICD-10-CM

## 2021-09-27 LAB
ALBUMIN SERPL BCG-MCNC: 3.4 G/DL (ref 3.4–4.8)
ALP SERPL-CCNC: 86 U/L (ref 40–110)
ALT SERPL W P-5'-P-CCNC: 12 U/L (ref 8–55)
ANION GAP SERPL CALC-SCNC: 12 MMOL/L (ref 10–20)
AST SERPL-CCNC: 14 U/L (ref 5–34)
BACTERIA UR QL AUTO: (no result) HPF
BASOPHILS # BLD AUTO: 0.1 THOU/UL (ref 0–0.2)
BASOPHILS NFR BLD AUTO: 1.1 % (ref 0–1)
BILIRUB SERPL-MCNC: 0.7 MG/DL (ref 0.2–1.2)
BUN SERPL-MCNC: 14 MG/DL (ref 9.8–20.1)
CALCIUM SERPL-MCNC: 9.2 MG/DL (ref 7.8–10.44)
CHLORIDE SERPL-SCNC: 109 MMOL/L (ref 98–107)
CO2 SERPL-SCNC: 27 MMOL/L (ref 23–31)
CREAT CL PREDICTED SERPL C-G-VRATE: 0 ML/MIN (ref 70–130)
EOSINOPHIL # BLD AUTO: 0 THOU/UL (ref 0–0.7)
EOSINOPHIL NFR BLD AUTO: 0.7 % (ref 0–10)
GLOBULIN SER CALC-MCNC: 3.7 G/DL (ref 2.4–3.5)
GLUCOSE SERPL-MCNC: 99 MG/DL (ref 83–110)
HGB BLD-MCNC: 14 G/DL (ref 12–16)
LYMPHOCYTES # BLD AUTO: 1.7 THOU/UL (ref 1.2–3.4)
LYMPHOCYTES NFR BLD AUTO: 25.1 % (ref 21–51)
MCH RBC QN AUTO: 30.4 PG (ref 27–31)
MCV RBC AUTO: 96.6 FL (ref 78–98)
MONOCYTES # BLD AUTO: 0.8 THOU/UL (ref 0.11–0.59)
MONOCYTES NFR BLD AUTO: 11.3 % (ref 0–10)
NEUTROPHILS # BLD AUTO: 4.2 THOU/UL (ref 1.4–6.5)
NEUTROPHILS NFR BLD AUTO: 61.8 % (ref 42–75)
PLATELET # BLD AUTO: 267 THOU/UL (ref 130–400)
POTASSIUM SERPL-SCNC: 3.8 MMOL/L (ref 3.5–5.1)
PROT UR STRIP.AUTO-MCNC: 30 MG/DL
RBC # BLD AUTO: 4.62 MILL/UL (ref 4.2–5.4)
RBC UR QL AUTO: (no result) HPF (ref 0–3)
SODIUM SERPL-SCNC: 144 MMOL/L (ref 136–145)
SP GR UR STRIP: 1.02 (ref 1–1.03)
WBC # BLD AUTO: 6.8 THOU/UL (ref 4.8–10.8)
WBC UR QL AUTO: (no result) HPF (ref 0–3)

## 2021-09-27 PROCEDURE — 87186 SC STD MICRODIL/AGAR DIL: CPT

## 2021-09-27 PROCEDURE — 80053 COMPREHEN METABOLIC PANEL: CPT

## 2021-09-27 PROCEDURE — 81003 URINALYSIS AUTO W/O SCOPE: CPT

## 2021-09-27 PROCEDURE — 87086 URINE CULTURE/COLONY COUNT: CPT

## 2021-09-27 PROCEDURE — 36415 COLL VENOUS BLD VENIPUNCTURE: CPT

## 2021-09-27 PROCEDURE — 87077 CULTURE AEROBIC IDENTIFY: CPT

## 2021-09-27 PROCEDURE — 81015 MICROSCOPIC EXAM OF URINE: CPT

## 2021-09-27 PROCEDURE — 51701 INSERT BLADDER CATHETER: CPT

## 2021-09-27 PROCEDURE — 85025 COMPLETE CBC W/AUTO DIFF WBC: CPT

## 2021-09-27 PROCEDURE — 36416 COLLJ CAPILLARY BLOOD SPEC: CPT

## 2021-09-27 PROCEDURE — 80048 BASIC METABOLIC PNL TOTAL CA: CPT

## 2021-09-27 RX ADMIN — Medication SCH UNITS: at 20:29

## 2021-09-27 RX ADMIN — LACTOBACILLUS ACIDOPHILUS / LACTOBACILLUS BULGARICUS SCH GM: 100 MILLION CFU STRENGTH GRANULES at 20:30

## 2021-09-28 RX ADMIN — NYSTATIN SCH APPLIC: 100000 POWDER TOPICAL at 20:53

## 2021-09-28 RX ADMIN — Medication SCH UNITS: at 20:55

## 2021-09-28 RX ADMIN — LACTOBACILLUS ACIDOPHILUS / LACTOBACILLUS BULGARICUS SCH GM: 100 MILLION CFU STRENGTH GRANULES at 20:55

## 2021-09-29 LAB
ANION GAP SERPL CALC-SCNC: 10 MMOL/L (ref 10–20)
BASOPHILS # BLD AUTO: 0.1 THOU/UL (ref 0–0.2)
BASOPHILS NFR BLD AUTO: 1.5 % (ref 0–1)
BUN SERPL-MCNC: 13 MG/DL (ref 9.8–20.1)
CALCIUM SERPL-MCNC: 7.9 MG/DL (ref 7.8–10.44)
CHLORIDE SERPL-SCNC: 112 MMOL/L (ref 98–107)
CO2 SERPL-SCNC: 25 MMOL/L (ref 23–31)
CREAT CL PREDICTED SERPL C-G-VRATE: 110 ML/MIN (ref 70–130)
EOSINOPHIL # BLD AUTO: 0 THOU/UL (ref 0–0.7)
EOSINOPHIL NFR BLD AUTO: 0.6 % (ref 0–10)
GLUCOSE SERPL-MCNC: 105 MG/DL (ref 83–110)
HGB BLD-MCNC: 11.5 G/DL (ref 12–16)
LYMPHOCYTES # BLD AUTO: 1.5 THOU/UL (ref 1.2–3.4)
LYMPHOCYTES NFR BLD AUTO: 23.8 % (ref 21–51)
MCH RBC QN AUTO: 30.2 PG (ref 27–31)
MCV RBC AUTO: 96.4 FL (ref 78–98)
MONOCYTES # BLD AUTO: 0.8 THOU/UL (ref 0.11–0.59)
MONOCYTES NFR BLD AUTO: 11.5 % (ref 0–10)
NEUTROPHILS # BLD AUTO: 4 THOU/UL (ref 1.4–6.5)
NEUTROPHILS NFR BLD AUTO: 62.5 % (ref 42–75)
PLATELET # BLD AUTO: 212 THOU/UL (ref 130–400)
POTASSIUM SERPL-SCNC: 3.8 MMOL/L (ref 3.5–5.1)
RBC # BLD AUTO: 3.82 MILL/UL (ref 4.2–5.4)
SODIUM SERPL-SCNC: 143 MMOL/L (ref 136–145)
WBC # BLD AUTO: 6.5 THOU/UL (ref 4.8–10.8)

## 2021-09-29 RX ADMIN — LACTOBACILLUS ACIDOPHILUS / LACTOBACILLUS BULGARICUS SCH GM: 100 MILLION CFU STRENGTH GRANULES at 21:43

## 2021-09-29 RX ADMIN — NYSTATIN SCH APPLIC: 100000 POWDER TOPICAL at 21:43

## 2021-09-29 RX ADMIN — Medication SCH UNITS: at 21:41

## 2021-09-29 RX ADMIN — NYSTATIN SCH APPLIC: 100000 POWDER TOPICAL at 07:52

## 2021-09-30 ENCOUNTER — HOSPITAL ENCOUNTER (INPATIENT)
Dept: HOSPITAL 18 - NAV ACUTE | Age: 76
LOS: 8 days | Discharge: HOME | DRG: 690 | End: 2021-10-08
Attending: STUDENT IN AN ORGANIZED HEALTH CARE EDUCATION/TRAINING PROGRAM | Admitting: STUDENT IN AN ORGANIZED HEALTH CARE EDUCATION/TRAINING PROGRAM
Payer: MEDICARE

## 2021-09-30 VITALS — TEMPERATURE: 97.7 F | SYSTOLIC BLOOD PRESSURE: 149 MMHG | DIASTOLIC BLOOD PRESSURE: 81 MMHG

## 2021-09-30 VITALS — BODY MASS INDEX: 30.6 KG/M2

## 2021-09-30 DIAGNOSIS — I69.351: ICD-10-CM

## 2021-09-30 DIAGNOSIS — I10: ICD-10-CM

## 2021-09-30 DIAGNOSIS — N39.0: Primary | ICD-10-CM

## 2021-09-30 DIAGNOSIS — E78.5: ICD-10-CM

## 2021-09-30 DIAGNOSIS — Z16.24: ICD-10-CM

## 2021-09-30 DIAGNOSIS — F32.A: ICD-10-CM

## 2021-09-30 DIAGNOSIS — I25.2: ICD-10-CM

## 2021-09-30 DIAGNOSIS — F41.9: ICD-10-CM

## 2021-09-30 DIAGNOSIS — K59.09: ICD-10-CM

## 2021-09-30 DIAGNOSIS — Z90.710: ICD-10-CM

## 2021-09-30 DIAGNOSIS — E66.9: ICD-10-CM

## 2021-09-30 LAB
ANION GAP SERPL CALC-SCNC: 9 MMOL/L (ref 10–20)
BUN SERPL-MCNC: 9 MG/DL (ref 9.8–20.1)
CALCIUM SERPL-MCNC: 8.6 MG/DL (ref 7.8–10.44)
CHLORIDE SERPL-SCNC: 107 MMOL/L (ref 98–107)
CO2 SERPL-SCNC: 29 MMOL/L (ref 23–31)
CREAT CL PREDICTED SERPL C-G-VRATE: 114 ML/MIN (ref 70–130)
GLUCOSE SERPL-MCNC: 96 MG/DL (ref 83–110)
HGB BLD-MCNC: 12.1 G/DL (ref 12–16)
MCH RBC QN AUTO: 30.2 PG (ref 27–31)
MCV RBC AUTO: 94.3 FL (ref 78–98)
MDIFF COMPLETE?: YES
PLATELET # BLD AUTO: 226 THOU/UL (ref 130–400)
POTASSIUM SERPL-SCNC: 3.8 MMOL/L (ref 3.5–5.1)
RBC # BLD AUTO: 4.01 MILL/UL (ref 4.2–5.4)
SODIUM SERPL-SCNC: 141 MMOL/L (ref 136–145)
WBC # BLD AUTO: 7.1 THOU/UL (ref 4.8–10.8)

## 2021-09-30 PROCEDURE — 36415 COLL VENOUS BLD VENIPUNCTURE: CPT

## 2021-09-30 PROCEDURE — 80048 BASIC METABOLIC PNL TOTAL CA: CPT

## 2021-09-30 PROCEDURE — 85025 COMPLETE CBC W/AUTO DIFF WBC: CPT

## 2021-09-30 RX ADMIN — NYSTATIN SCH APPLIC: 100000 POWDER TOPICAL at 20:29

## 2021-09-30 RX ADMIN — NYSTATIN SCH APPLIC: 100000 POWDER TOPICAL at 08:29

## 2021-09-30 RX ADMIN — LACTOBACILLUS ACIDOPHILUS / LACTOBACILLUS BULGARICUS SCH GM: 100 MILLION CFU STRENGTH GRANULES at 20:29

## 2021-09-30 RX ADMIN — Medication SCH UNITS: at 20:30

## 2021-10-01 RX ADMIN — NYSTATIN SCH APPLIC: 100000 POWDER TOPICAL at 12:07

## 2021-10-01 RX ADMIN — LACTOBACILLUS ACIDOPHILUS / LACTOBACILLUS BULGARICUS SCH GM: 100 MILLION CFU STRENGTH GRANULES at 20:29

## 2021-10-01 RX ADMIN — NYSTATIN SCH APPLIC: 100000 POWDER TOPICAL at 20:30

## 2021-10-01 RX ADMIN — Medication SCH UNITS: at 20:30

## 2021-10-02 RX ADMIN — LACTOBACILLUS ACIDOPHILUS / LACTOBACILLUS BULGARICUS SCH GM: 100 MILLION CFU STRENGTH GRANULES at 20:49

## 2021-10-02 RX ADMIN — NYSTATIN SCH APPLIC: 100000 POWDER TOPICAL at 20:48

## 2021-10-02 RX ADMIN — Medication SCH UNITS: at 20:49

## 2021-10-02 RX ADMIN — NYSTATIN SCH APPLIC: 100000 POWDER TOPICAL at 08:58

## 2021-10-03 RX ADMIN — NYSTATIN SCH APPLIC: 100000 POWDER TOPICAL at 09:06

## 2021-10-03 RX ADMIN — LACTOBACILLUS ACIDOPHILUS / LACTOBACILLUS BULGARICUS SCH GM: 100 MILLION CFU STRENGTH GRANULES at 20:35

## 2021-10-03 RX ADMIN — NYSTATIN SCH APPLIC: 100000 POWDER TOPICAL at 20:35

## 2021-10-03 RX ADMIN — Medication SCH UNITS: at 20:35

## 2021-10-04 RX ADMIN — NYSTATIN SCH APPLIC: 100000 POWDER TOPICAL at 21:14

## 2021-10-04 RX ADMIN — Medication SCH UNITS: at 21:13

## 2021-10-04 RX ADMIN — NYSTATIN SCH APPLIC: 100000 POWDER TOPICAL at 10:10

## 2021-10-04 RX ADMIN — LACTOBACILLUS ACIDOPHILUS / LACTOBACILLUS BULGARICUS SCH GM: 100 MILLION CFU STRENGTH GRANULES at 21:13

## 2021-10-05 RX ADMIN — Medication SCH UNITS: at 20:39

## 2021-10-05 RX ADMIN — NYSTATIN SCH APPLIC: 100000 POWDER TOPICAL at 20:41

## 2021-10-05 RX ADMIN — NYSTATIN SCH APPLIC: 100000 POWDER TOPICAL at 08:30

## 2021-10-05 RX ADMIN — LACTOBACILLUS ACIDOPHILUS / LACTOBACILLUS BULGARICUS SCH GM: 100 MILLION CFU STRENGTH GRANULES at 20:39

## 2021-10-06 LAB
ANION GAP SERPL CALC-SCNC: 10 MMOL/L (ref 10–20)
BASOPHILS # BLD AUTO: 0.1 THOU/UL (ref 0–0.2)
BASOPHILS NFR BLD AUTO: 1.3 % (ref 0–1)
BUN SERPL-MCNC: 10 MG/DL (ref 9.8–20.1)
CALCIUM SERPL-MCNC: 8.2 MG/DL (ref 7.8–10.44)
CHLORIDE SERPL-SCNC: 109 MMOL/L (ref 98–107)
CO2 SERPL-SCNC: 24 MMOL/L (ref 23–31)
CREAT CL PREDICTED SERPL C-G-VRATE: 124 ML/MIN (ref 70–130)
EOSINOPHIL # BLD AUTO: 0.1 THOU/UL (ref 0–0.7)
EOSINOPHIL NFR BLD AUTO: 1.3 % (ref 0–10)
GLUCOSE SERPL-MCNC: 91 MG/DL (ref 83–110)
HGB BLD-MCNC: 11.5 G/DL (ref 12–16)
LYMPHOCYTES # BLD AUTO: 2 THOU/UL (ref 1.2–3.4)
LYMPHOCYTES NFR BLD AUTO: 30.1 % (ref 21–51)
MCH RBC QN AUTO: 30.3 PG (ref 27–31)
MCV RBC AUTO: 96.8 FL (ref 78–98)
MONOCYTES # BLD AUTO: 0.5 THOU/UL (ref 0.11–0.59)
MONOCYTES NFR BLD AUTO: 7.9 % (ref 0–10)
NEUTROPHILS # BLD AUTO: 3.9 THOU/UL (ref 1.4–6.5)
NEUTROPHILS NFR BLD AUTO: 59.4 % (ref 42–75)
PLATELET # BLD AUTO: 229 THOU/UL (ref 130–400)
POTASSIUM SERPL-SCNC: 3.4 MMOL/L (ref 3.5–5.1)
RBC # BLD AUTO: 3.8 MILL/UL (ref 4.2–5.4)
SODIUM SERPL-SCNC: 140 MMOL/L (ref 136–145)
WBC # BLD AUTO: 6.6 THOU/UL (ref 4.8–10.8)

## 2021-10-06 RX ADMIN — LACTOBACILLUS ACIDOPHILUS / LACTOBACILLUS BULGARICUS SCH GM: 100 MILLION CFU STRENGTH GRANULES at 21:18

## 2021-10-06 RX ADMIN — NYSTATIN SCH APPLIC: 100000 POWDER TOPICAL at 21:19

## 2021-10-06 RX ADMIN — NYSTATIN SCH APPLIC: 100000 POWDER TOPICAL at 08:29

## 2021-10-06 RX ADMIN — Medication SCH UNITS: at 21:18

## 2021-10-07 RX ADMIN — LACTOBACILLUS ACIDOPHILUS / LACTOBACILLUS BULGARICUS SCH GM: 100 MILLION CFU STRENGTH GRANULES at 21:24

## 2021-10-07 RX ADMIN — NYSTATIN SCH APPLIC: 100000 POWDER TOPICAL at 21:25

## 2021-10-07 RX ADMIN — Medication SCH UNITS: at 21:25

## 2021-10-07 RX ADMIN — NYSTATIN SCH APPLIC: 100000 POWDER TOPICAL at 09:20

## 2021-10-08 VITALS — TEMPERATURE: 98.6 F | SYSTOLIC BLOOD PRESSURE: 154 MMHG | DIASTOLIC BLOOD PRESSURE: 82 MMHG

## 2021-10-08 RX ADMIN — NYSTATIN SCH APPLIC: 100000 POWDER TOPICAL at 09:43

## 2021-10-18 ENCOUNTER — HOSPITAL ENCOUNTER (INPATIENT)
Dept: HOSPITAL 92 - ERS | Age: 76
LOS: 4 days | Discharge: HOME | DRG: 689 | End: 2021-10-22
Attending: FAMILY MEDICINE | Admitting: FAMILY MEDICINE
Payer: MEDICARE

## 2021-10-18 VITALS — BODY MASS INDEX: 29.2 KG/M2

## 2021-10-18 DIAGNOSIS — I10: ICD-10-CM

## 2021-10-18 DIAGNOSIS — N20.0: ICD-10-CM

## 2021-10-18 DIAGNOSIS — Z87.820: ICD-10-CM

## 2021-10-18 DIAGNOSIS — R45.1: ICD-10-CM

## 2021-10-18 DIAGNOSIS — K80.20: ICD-10-CM

## 2021-10-18 DIAGNOSIS — Z20.822: ICD-10-CM

## 2021-10-18 DIAGNOSIS — E78.5: ICD-10-CM

## 2021-10-18 DIAGNOSIS — G93.41: ICD-10-CM

## 2021-10-18 DIAGNOSIS — K21.9: ICD-10-CM

## 2021-10-18 DIAGNOSIS — E87.6: ICD-10-CM

## 2021-10-18 DIAGNOSIS — N39.0: Primary | ICD-10-CM

## 2021-10-18 LAB
ALBUMIN SERPL BCG-MCNC: 3.5 G/DL (ref 3.4–4.8)
ALP SERPL-CCNC: 113 U/L (ref 40–110)
ALT SERPL W P-5'-P-CCNC: 9 U/L (ref 8–55)
ANION GAP SERPL CALC-SCNC: 14 MMOL/L (ref 10–20)
AST SERPL-CCNC: 10 U/L (ref 5–34)
BACTERIA UR QL AUTO: (no result) HPF
BASOPHILS # BLD AUTO: 0 THOU/UL (ref 0–0.2)
BASOPHILS NFR BLD AUTO: 0.3 % (ref 0–1)
BILIRUB SERPL-MCNC: 0.7 MG/DL (ref 0.2–1.2)
BUN SERPL-MCNC: 14 MG/DL (ref 9.8–20.1)
CALCIUM SERPL-MCNC: 9.2 MG/DL (ref 7.8–10.44)
CHLORIDE SERPL-SCNC: 105 MMOL/L (ref 98–107)
CO2 SERPL-SCNC: 27 MMOL/L (ref 23–31)
CREAT CL PREDICTED SERPL C-G-VRATE: 0 ML/MIN (ref 70–130)
EOSINOPHIL # BLD AUTO: 0.1 THOU/UL (ref 0–0.7)
EOSINOPHIL NFR BLD AUTO: 1.1 % (ref 0–10)
GLOBULIN SER CALC-MCNC: 3.9 G/DL (ref 2.4–3.5)
GLUCOSE SERPL-MCNC: 94 MG/DL (ref 83–110)
HGB BLD-MCNC: 14.7 G/DL (ref 12–16)
LYMPHOCYTES # BLD: 1.8 THOU/UL (ref 1.2–3.4)
LYMPHOCYTES NFR BLD AUTO: 20.9 % (ref 21–51)
MCH RBC QN AUTO: 30.9 PG (ref 27–31)
MCV RBC AUTO: 95.3 FL (ref 78–98)
MONOCYTES # BLD AUTO: 0.6 THOU/UL (ref 0.11–0.59)
MONOCYTES NFR BLD AUTO: 7.1 % (ref 0–10)
NEUTROPHILS # BLD AUTO: 5.9 THOU/UL (ref 1.4–6.5)
NEUTROPHILS NFR BLD AUTO: 70.6 % (ref 42–75)
PLATELET # BLD AUTO: 288 THOU/UL (ref 130–400)
POTASSIUM SERPL-SCNC: 4.2 MMOL/L (ref 3.5–5.1)
PROT UR STRIP.AUTO-MCNC: 30 MG/DL
RBC # BLD AUTO: 4.76 MILL/UL (ref 4.2–5.4)
RBC UR QL AUTO: (no result) HPF (ref 0–3)
SODIUM SERPL-SCNC: 142 MMOL/L (ref 136–145)
SP GR UR STRIP: 1.02 (ref 1–1.03)
WBC # BLD AUTO: 8.4 THOU/UL (ref 4.8–10.8)

## 2021-10-18 PROCEDURE — 51701 INSERT BLADDER CATHETER: CPT

## 2021-10-18 PROCEDURE — 87186 SC STD MICRODIL/AGAR DIL: CPT

## 2021-10-18 PROCEDURE — 74176 CT ABD & PELVIS W/O CONTRAST: CPT

## 2021-10-18 PROCEDURE — 84145 PROCALCITONIN (PCT): CPT

## 2021-10-18 PROCEDURE — 96375 TX/PRO/DX INJ NEW DRUG ADDON: CPT

## 2021-10-18 PROCEDURE — 80053 COMPREHEN METABOLIC PANEL: CPT

## 2021-10-18 PROCEDURE — 81003 URINALYSIS AUTO W/O SCOPE: CPT

## 2021-10-18 PROCEDURE — 87077 CULTURE AEROBIC IDENTIFY: CPT

## 2021-10-18 PROCEDURE — 71045 X-RAY EXAM CHEST 1 VIEW: CPT

## 2021-10-18 PROCEDURE — 87449 NOS EACH ORGANISM AG IA: CPT

## 2021-10-18 PROCEDURE — 80048 BASIC METABOLIC PNL TOTAL CA: CPT

## 2021-10-18 PROCEDURE — 87086 URINE CULTURE/COLONY COUNT: CPT

## 2021-10-18 PROCEDURE — 87040 BLOOD CULTURE FOR BACTERIA: CPT

## 2021-10-18 PROCEDURE — 70450 CT HEAD/BRAIN W/O DYE: CPT

## 2021-10-18 PROCEDURE — 85025 COMPLETE CBC W/AUTO DIFF WBC: CPT

## 2021-10-18 PROCEDURE — 87324 CLOSTRIDIUM AG IA: CPT

## 2021-10-18 PROCEDURE — 81015 MICROSCOPIC EXAM OF URINE: CPT

## 2021-10-18 PROCEDURE — U0003 INFECTIOUS AGENT DETECTION BY NUCLEIC ACID (DNA OR RNA); SEVERE ACUTE RESPIRATORY SYNDROME CORONAVIRUS 2 (SARS-COV-2) (CORONAVIRUS DISEASE [COVID-19]), AMPLIFIED PROBE TECHNIQUE, MAKING USE OF HIGH THROUGHPUT TECHNOLOGIES AS DESCRIBED BY CMS-2020-01-R: HCPCS

## 2021-10-18 PROCEDURE — U0005 INFEC AGEN DETEC AMPLI PROBE: HCPCS

## 2021-10-18 PROCEDURE — 87149 DNA/RNA DIRECT PROBE: CPT

## 2021-10-18 PROCEDURE — 36415 COLL VENOUS BLD VENIPUNCTURE: CPT

## 2021-10-18 PROCEDURE — 96365 THER/PROPH/DIAG IV INF INIT: CPT

## 2021-10-18 PROCEDURE — 87493 C DIFF AMPLIFIED PROBE: CPT

## 2021-10-18 PROCEDURE — 96372 THER/PROPH/DIAG INJ SC/IM: CPT

## 2021-10-18 PROCEDURE — G0378 HOSPITAL OBSERVATION PER HR: HCPCS

## 2021-10-18 PROCEDURE — 93005 ELECTROCARDIOGRAM TRACING: CPT

## 2021-10-18 PROCEDURE — 96376 TX/PRO/DX INJ SAME DRUG ADON: CPT

## 2021-10-19 LAB
ANION GAP SERPL CALC-SCNC: 13 MMOL/L (ref 10–20)
BUN SERPL-MCNC: 14 MG/DL (ref 9.8–20.1)
CALCIUM SERPL-MCNC: 8.6 MG/DL (ref 7.8–10.44)
CHLORIDE SERPL-SCNC: 106 MMOL/L (ref 98–107)
CO2 SERPL-SCNC: 27 MMOL/L (ref 23–31)
CREAT CL PREDICTED SERPL C-G-VRATE: 99 ML/MIN (ref 70–130)
GLUCOSE SERPL-MCNC: 94 MG/DL (ref 83–110)
HGB BLD-MCNC: 13.2 G/DL (ref 12–16)
MCH RBC QN AUTO: 32.4 PG (ref 27–31)
MCV RBC AUTO: 94.2 FL (ref 78–98)
MDIFF COMPLETE?: YES
PLATELET # BLD AUTO: 279 THOU/UL (ref 130–400)
POTASSIUM SERPL-SCNC: 3.5 MMOL/L (ref 3.5–5.1)
RBC # BLD AUTO: 4.06 MILL/UL (ref 4.2–5.4)
SODIUM SERPL-SCNC: 142 MMOL/L (ref 136–145)
WBC # BLD AUTO: 7.7 THOU/UL (ref 4.8–10.8)

## 2021-10-20 LAB
ANION GAP SERPL CALC-SCNC: 14 MMOL/L (ref 10–20)
BASOPHILS # BLD AUTO: 0 THOU/UL (ref 0–0.2)
BASOPHILS NFR BLD AUTO: 0.2 % (ref 0–1)
BUN SERPL-MCNC: 15 MG/DL (ref 9.8–20.1)
CALCIUM SERPL-MCNC: 8.5 MG/DL (ref 7.8–10.44)
CHLORIDE SERPL-SCNC: 107 MMOL/L (ref 98–107)
CO2 SERPL-SCNC: 24 MMOL/L (ref 23–31)
CREAT CL PREDICTED SERPL C-G-VRATE: 104 ML/MIN (ref 70–130)
EOSINOPHIL # BLD AUTO: 0 THOU/UL (ref 0–0.7)
EOSINOPHIL NFR BLD AUTO: 0.6 % (ref 0–10)
GLUCOSE SERPL-MCNC: 99 MG/DL (ref 83–110)
HGB BLD-MCNC: 13.6 G/DL (ref 12–16)
LYMPHOCYTES # BLD: 1.3 THOU/UL (ref 1.2–3.4)
LYMPHOCYTES NFR BLD AUTO: 16.5 % (ref 21–51)
MCH RBC QN AUTO: 30.4 PG (ref 27–31)
MCV RBC AUTO: 94.4 FL (ref 78–98)
MONOCYTES # BLD AUTO: 0.6 THOU/UL (ref 0.11–0.59)
MONOCYTES NFR BLD AUTO: 7 % (ref 0–10)
NEUTROPHILS # BLD AUTO: 5.9 THOU/UL (ref 1.4–6.5)
NEUTROPHILS NFR BLD AUTO: 75.7 % (ref 42–75)
PLATELET # BLD AUTO: 313 THOU/UL (ref 130–400)
POTASSIUM SERPL-SCNC: 3.9 MMOL/L (ref 3.5–5.1)
RBC # BLD AUTO: 4.47 MILL/UL (ref 4.2–5.4)
SODIUM SERPL-SCNC: 141 MMOL/L (ref 136–145)
WBC # BLD AUTO: 7.8 THOU/UL (ref 4.8–10.8)

## 2021-10-20 RX ADMIN — NYSTATIN SCH GM: 100000 POWDER TOPICAL at 21:36

## 2021-10-20 RX ADMIN — LACTOBACILLUS ACIDOPH-L.BULGARICUS 1 MILLION CELL CHEWABLE TABLET SCH TAB: at 21:38

## 2021-10-20 RX ADMIN — Medication SCH UNITS: at 21:39

## 2021-10-21 LAB
ANION GAP SERPL CALC-SCNC: 11 MMOL/L (ref 10–20)
BASOPHILS # BLD AUTO: 0 THOU/UL (ref 0–0.2)
BASOPHILS NFR BLD AUTO: 0.5 % (ref 0–1)
BUN SERPL-MCNC: 7 MG/DL (ref 9.8–20.1)
CALCIUM SERPL-MCNC: 8.4 MG/DL (ref 7.8–10.44)
CHLORIDE SERPL-SCNC: 111 MMOL/L (ref 98–107)
CO2 SERPL-SCNC: 25 MMOL/L (ref 23–31)
CREAT CL PREDICTED SERPL C-G-VRATE: 100 ML/MIN (ref 70–130)
EOSINOPHIL # BLD AUTO: 0.1 THOU/UL (ref 0–0.7)
EOSINOPHIL NFR BLD AUTO: 1.6 % (ref 0–10)
GLUCOSE SERPL-MCNC: 94 MG/DL (ref 83–110)
HGB BLD-MCNC: 11.7 G/DL (ref 12–16)
LYMPHOCYTES # BLD: 2.4 THOU/UL (ref 1.2–3.4)
LYMPHOCYTES NFR BLD AUTO: 33.4 % (ref 21–51)
MCH RBC QN AUTO: 31.3 PG (ref 27–31)
MCV RBC AUTO: 94.9 FL (ref 78–98)
MONOCYTES # BLD AUTO: 0.7 THOU/UL (ref 0.11–0.59)
MONOCYTES NFR BLD AUTO: 9.4 % (ref 0–10)
NEUTROPHILS # BLD AUTO: 4 THOU/UL (ref 1.4–6.5)
NEUTROPHILS NFR BLD AUTO: 55 % (ref 42–75)
PLATELET # BLD AUTO: 276 THOU/UL (ref 130–400)
POTASSIUM SERPL-SCNC: 3.2 MMOL/L (ref 3.5–5.1)
RBC # BLD AUTO: 3.73 MILL/UL (ref 4.2–5.4)
SODIUM SERPL-SCNC: 144 MMOL/L (ref 136–145)
WBC # BLD AUTO: 7.3 THOU/UL (ref 4.8–10.8)

## 2021-10-21 RX ADMIN — NYSTATIN SCH GM: 100000 POWDER TOPICAL at 21:53

## 2021-10-21 RX ADMIN — NYSTATIN SCH GM: 100000 POWDER TOPICAL at 10:17

## 2021-10-21 RX ADMIN — SULFAMETHOXAZOLE AND TRIMETHOPRIM SCH TAB: 800; 160 TABLET ORAL at 20:27

## 2021-10-21 RX ADMIN — LACTOBACILLUS ACIDOPH-L.BULGARICUS 1 MILLION CELL CHEWABLE TABLET SCH TAB: at 20:27

## 2021-10-21 RX ADMIN — Medication SCH UNITS: at 20:27

## 2021-10-22 LAB
ANION GAP SERPL CALC-SCNC: 18 MMOL/L (ref 10–20)
BASOPHILS # BLD AUTO: 0.1 THOU/UL (ref 0–0.2)
BASOPHILS NFR BLD AUTO: 0.6 % (ref 0–1)
BUN SERPL-MCNC: 8 MG/DL (ref 9.8–20.1)
CALCIUM SERPL-MCNC: 8.9 MG/DL (ref 7.8–10.44)
CHLORIDE SERPL-SCNC: 111 MMOL/L (ref 98–107)
CO2 SERPL-SCNC: 21 MMOL/L (ref 23–31)
CREAT CL PREDICTED SERPL C-G-VRATE: 73 ML/MIN (ref 70–130)
EOSINOPHIL # BLD AUTO: 0.1 THOU/UL (ref 0–0.7)
EOSINOPHIL NFR BLD AUTO: 1.3 % (ref 0–10)
GLUCOSE SERPL-MCNC: 115 MG/DL (ref 83–110)
HGB BLD-MCNC: 13.5 G/DL (ref 12–16)
LYMPHOCYTES # BLD: 2.2 THOU/UL (ref 1.2–3.4)
LYMPHOCYTES NFR BLD AUTO: 21.4 % (ref 21–51)
MCH RBC QN AUTO: 31.8 PG (ref 27–31)
MCV RBC AUTO: 98.3 FL (ref 78–98)
MONOCYTES # BLD AUTO: 0.8 THOU/UL (ref 0.11–0.59)
MONOCYTES NFR BLD AUTO: 7.5 % (ref 0–10)
NEUTROPHILS # BLD AUTO: 7 THOU/UL (ref 1.4–6.5)
NEUTROPHILS NFR BLD AUTO: 69.3 % (ref 42–75)
PLATELET # BLD AUTO: 281 THOU/UL (ref 130–400)
POTASSIUM SERPL-SCNC: 3.6 MMOL/L (ref 3.5–5.1)
RBC # BLD AUTO: 4.23 MILL/UL (ref 4.2–5.4)
SODIUM SERPL-SCNC: 146 MMOL/L (ref 136–145)
WBC # BLD AUTO: 10.1 THOU/UL (ref 4.8–10.8)

## 2021-10-22 RX ADMIN — LACTOBACILLUS ACIDOPH-L.BULGARICUS 1 MILLION CELL CHEWABLE TABLET SCH TAB: at 21:27

## 2021-10-22 RX ADMIN — NYSTATIN SCH GM: 100000 POWDER TOPICAL at 13:23

## 2021-10-22 RX ADMIN — NYSTATIN SCH GM: 100000 POWDER TOPICAL at 21:28

## 2021-10-22 RX ADMIN — SULFAMETHOXAZOLE AND TRIMETHOPRIM SCH TAB: 800; 160 TABLET ORAL at 21:27

## 2021-10-22 RX ADMIN — Medication SCH UNITS: at 21:27

## 2021-10-22 RX ADMIN — SULFAMETHOXAZOLE AND TRIMETHOPRIM SCH TAB: 800; 160 TABLET ORAL at 10:54

## 2021-10-23 VITALS — SYSTOLIC BLOOD PRESSURE: 144 MMHG | TEMPERATURE: 98 F | DIASTOLIC BLOOD PRESSURE: 83 MMHG

## 2021-11-19 ENCOUNTER — HOSPITAL ENCOUNTER (EMERGENCY)
Dept: HOSPITAL 92 - CSHERS | Age: 76
Discharge: LEFT BEFORE DISCHARGE | End: 2021-11-19
Payer: MEDICARE

## 2021-11-19 ENCOUNTER — HOSPITAL ENCOUNTER (INPATIENT)
Dept: HOSPITAL 92 - ERS | Age: 76
LOS: 4 days | Discharge: HOME HEALTH | DRG: 659 | End: 2021-11-23
Attending: FAMILY MEDICINE | Admitting: FAMILY MEDICINE
Payer: COMMERCIAL

## 2021-11-19 VITALS — BODY MASS INDEX: 30.5 KG/M2

## 2021-11-19 DIAGNOSIS — Z79.899: ICD-10-CM

## 2021-11-19 DIAGNOSIS — I25.2: ICD-10-CM

## 2021-11-19 DIAGNOSIS — R21: ICD-10-CM

## 2021-11-19 DIAGNOSIS — R32: ICD-10-CM
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
## 2021-11-19 DIAGNOSIS — N39.0: Primary | ICD-10-CM

## 2021-11-19 DIAGNOSIS — Z20.822: ICD-10-CM

## 2021-11-19 DIAGNOSIS — Z90.710: ICD-10-CM

## 2021-11-19 DIAGNOSIS — I10: ICD-10-CM

## 2021-11-19 DIAGNOSIS — Z82.49: ICD-10-CM

## 2021-11-19 DIAGNOSIS — Z87.442: ICD-10-CM

## 2021-11-19 DIAGNOSIS — K21.9: ICD-10-CM

## 2021-11-19 DIAGNOSIS — Z74.01: ICD-10-CM

## 2021-11-19 DIAGNOSIS — E78.5: ICD-10-CM

## 2021-11-19 DIAGNOSIS — E87.6: ICD-10-CM

## 2021-11-19 DIAGNOSIS — Z53.21: Primary | ICD-10-CM

## 2021-11-19 DIAGNOSIS — Z16.24: ICD-10-CM

## 2021-11-19 DIAGNOSIS — G81.91: ICD-10-CM

## 2021-11-19 DIAGNOSIS — S06.9X9S: ICD-10-CM

## 2021-11-19 DIAGNOSIS — K59.09: ICD-10-CM

## 2021-11-19 DIAGNOSIS — Z28.21: ICD-10-CM

## 2021-11-19 DIAGNOSIS — G93.41: ICD-10-CM

## 2021-11-19 DIAGNOSIS — N20.0: ICD-10-CM

## 2021-11-19 DIAGNOSIS — D50.9: ICD-10-CM

## 2021-11-19 DIAGNOSIS — F03.90: ICD-10-CM

## 2021-11-19 LAB
ALBUMIN SERPL BCG-MCNC: 3.4 G/DL (ref 3.4–4.8)
ALP SERPL-CCNC: 97 U/L (ref 40–110)
ALT SERPL W P-5'-P-CCNC: 7 U/L (ref 8–55)
ANION GAP SERPL CALC-SCNC: 11 MMOL/L (ref 10–20)
AST SERPL-CCNC: 11 U/L (ref 5–34)
BASOPHILS # BLD AUTO: 0 THOU/UL (ref 0–0.2)
BASOPHILS NFR BLD AUTO: 0.3 % (ref 0–1)
BILIRUB SERPL-MCNC: 0.4 MG/DL (ref 0.2–1.2)
BUN SERPL-MCNC: 18 MG/DL (ref 9.8–20.1)
CALCIUM SERPL-MCNC: 8.7 MG/DL (ref 7.8–10.44)
CHLORIDE SERPL-SCNC: 106 MMOL/L (ref 98–107)
CO2 SERPL-SCNC: 29 MMOL/L (ref 23–31)
CREAT CL PREDICTED SERPL C-G-VRATE: 0 ML/MIN (ref 70–130)
EOSINOPHIL # BLD AUTO: 0.1 THOU/UL (ref 0–0.7)
EOSINOPHIL NFR BLD AUTO: 1.6 % (ref 0–10)
GLOBULIN SER CALC-MCNC: 3.1 G/DL (ref 2.4–3.5)
GLUCOSE SERPL-MCNC: 104 MG/DL (ref 83–110)
HGB BLD-MCNC: 12.6 G/DL (ref 12–16)
LYMPHOCYTES # BLD: 1.8 THOU/UL (ref 1.2–3.4)
LYMPHOCYTES NFR BLD AUTO: 18.6 % (ref 21–51)
MCH RBC QN AUTO: 32.4 PG (ref 27–31)
MCV RBC AUTO: 97.6 FL (ref 78–98)
MONOCYTES # BLD AUTO: 0.7 THOU/UL (ref 0.11–0.59)
MONOCYTES NFR BLD AUTO: 7.3 % (ref 0–10)
NEUTROPHILS # BLD AUTO: 7 THOU/UL (ref 1.4–6.5)
NEUTROPHILS NFR BLD AUTO: 72.4 % (ref 42–75)
PLATELET # BLD AUTO: 252 THOU/UL (ref 130–400)
POTASSIUM SERPL-SCNC: 4.1 MMOL/L (ref 3.5–5.1)
RBC # BLD AUTO: 3.89 MILL/UL (ref 4.2–5.4)
SODIUM SERPL-SCNC: 142 MMOL/L (ref 136–145)
WBC # BLD AUTO: 9.6 THOU/UL (ref 4.8–10.8)

## 2021-11-19 PROCEDURE — U0003 INFECTIOUS AGENT DETECTION BY NUCLEIC ACID (DNA OR RNA); SEVERE ACUTE RESPIRATORY SYNDROME CORONAVIRUS 2 (SARS-COV-2) (CORONAVIRUS DISEASE [COVID-19]), AMPLIFIED PROBE TECHNIQUE, MAKING USE OF HIGH THROUGHPUT TECHNOLOGIES AS DESCRIBED BY CMS-2020-01-R: HCPCS

## 2021-11-19 PROCEDURE — U0005 INFEC AGEN DETEC AMPLI PROBE: HCPCS

## 2021-11-19 PROCEDURE — 88300 SURGICAL PATH GROSS: CPT

## 2021-11-19 PROCEDURE — 36415 COLL VENOUS BLD VENIPUNCTURE: CPT

## 2021-11-19 PROCEDURE — 82365 CALCULUS SPECTROSCOPY: CPT

## 2021-11-19 PROCEDURE — 80048 BASIC METABOLIC PNL TOTAL CA: CPT

## 2021-11-19 PROCEDURE — 83605 ASSAY OF LACTIC ACID: CPT

## 2021-11-19 PROCEDURE — 87149 DNA/RNA DIRECT PROBE: CPT

## 2021-11-19 PROCEDURE — 93005 ELECTROCARDIOGRAM TRACING: CPT

## 2021-11-19 PROCEDURE — 85025 COMPLETE CBC W/AUTO DIFF WBC: CPT

## 2021-11-19 PROCEDURE — 80053 COMPREHEN METABOLIC PANEL: CPT

## 2021-11-19 PROCEDURE — 96365 THER/PROPH/DIAG IV INF INIT: CPT

## 2021-11-19 PROCEDURE — C2617 STENT, NON-COR, TEM W/O DEL: HCPCS

## 2021-11-19 PROCEDURE — 87040 BLOOD CULTURE FOR BACTERIA: CPT

## 2021-11-19 PROCEDURE — 84484 ASSAY OF TROPONIN QUANT: CPT

## 2021-11-20 LAB
ANION GAP SERPL CALC-SCNC: 10 MMOL/L (ref 10–20)
BASOPHILS # BLD AUTO: 0 THOU/UL (ref 0–0.2)
BASOPHILS NFR BLD AUTO: 0.4 % (ref 0–1)
BUN SERPL-MCNC: 17 MG/DL (ref 9.8–20.1)
CALCIUM SERPL-MCNC: 8.8 MG/DL (ref 7.8–10.44)
CHLORIDE SERPL-SCNC: 110 MMOL/L (ref 98–107)
CO2 SERPL-SCNC: 26 MMOL/L (ref 23–31)
CREAT CL PREDICTED SERPL C-G-VRATE: 95 ML/MIN (ref 70–130)
EOSINOPHIL # BLD AUTO: 0.1 THOU/UL (ref 0–0.7)
EOSINOPHIL NFR BLD AUTO: 2 % (ref 0–10)
GLUCOSE SERPL-MCNC: 96 MG/DL (ref 83–110)
HGB BLD-MCNC: 13.4 G/DL (ref 12–16)
LYMPHOCYTES # BLD: 1.8 THOU/UL (ref 1.2–3.4)
LYMPHOCYTES NFR BLD AUTO: 24.5 % (ref 21–51)
MCH RBC QN AUTO: 32.7 PG (ref 27–31)
MCV RBC AUTO: 97.3 FL (ref 78–98)
MONOCYTES # BLD AUTO: 0.6 THOU/UL (ref 0.11–0.59)
MONOCYTES NFR BLD AUTO: 8 % (ref 0–10)
NEUTROPHILS # BLD AUTO: 4.8 THOU/UL (ref 1.4–6.5)
NEUTROPHILS NFR BLD AUTO: 65.1 % (ref 42–75)
PLATELET # BLD AUTO: 211 THOU/UL (ref 130–400)
POTASSIUM SERPL-SCNC: 3.8 MMOL/L (ref 3.5–5.1)
RBC # BLD AUTO: 4.11 MILL/UL (ref 4.2–5.4)
SODIUM SERPL-SCNC: 142 MMOL/L (ref 136–145)
WBC # BLD AUTO: 7.4 THOU/UL (ref 4.8–10.8)

## 2021-11-20 RX ADMIN — LACTOBACILLUS ACIDOPH-L.BULGARICUS 1 MILLION CELL CHEWABLE TABLET SCH TAB: at 20:43

## 2021-11-20 RX ADMIN — Medication SCH ML: at 08:47

## 2021-11-20 RX ADMIN — Medication SCH ML: at 21:17

## 2021-11-20 RX ADMIN — Medication SCH UNITS: at 20:43

## 2021-11-21 LAB
ANION GAP SERPL CALC-SCNC: 11 MMOL/L (ref 10–20)
BASOPHILS # BLD AUTO: 0 THOU/UL (ref 0–0.2)
BASOPHILS NFR BLD AUTO: 0.4 % (ref 0–1)
BUN SERPL-MCNC: 11 MG/DL (ref 9.8–20.1)
CALCIUM SERPL-MCNC: 8.5 MG/DL (ref 7.8–10.44)
CHLORIDE SERPL-SCNC: 110 MMOL/L (ref 98–107)
CO2 SERPL-SCNC: 24 MMOL/L (ref 23–31)
CREAT CL PREDICTED SERPL C-G-VRATE: 107 ML/MIN (ref 70–130)
EOSINOPHIL # BLD AUTO: 0.1 THOU/UL (ref 0–0.7)
EOSINOPHIL NFR BLD AUTO: 1.4 % (ref 0–10)
GLUCOSE SERPL-MCNC: 95 MG/DL (ref 83–110)
HGB BLD-MCNC: 12 G/DL (ref 12–16)
LYMPHOCYTES # BLD: 1.8 THOU/UL (ref 1.2–3.4)
LYMPHOCYTES NFR BLD AUTO: 26.9 % (ref 21–51)
MCH RBC QN AUTO: 31.9 PG (ref 27–31)
MCV RBC AUTO: 97.4 FL (ref 78–98)
MONOCYTES # BLD AUTO: 0.5 THOU/UL (ref 0.11–0.59)
MONOCYTES NFR BLD AUTO: 6.8 % (ref 0–10)
NEUTROPHILS # BLD AUTO: 4.4 THOU/UL (ref 1.4–6.5)
NEUTROPHILS NFR BLD AUTO: 64.4 % (ref 42–75)
PLATELET # BLD AUTO: 226 THOU/UL (ref 130–400)
POTASSIUM SERPL-SCNC: 3.4 MMOL/L (ref 3.5–5.1)
RBC # BLD AUTO: 3.76 MILL/UL (ref 4.2–5.4)
SODIUM SERPL-SCNC: 142 MMOL/L (ref 136–145)
WBC # BLD AUTO: 6.8 THOU/UL (ref 4.8–10.8)

## 2021-11-21 RX ADMIN — LACTOBACILLUS ACIDOPH-L.BULGARICUS 1 MILLION CELL CHEWABLE TABLET SCH TAB: at 20:15

## 2021-11-21 RX ADMIN — Medication SCH ML: at 22:07

## 2021-11-21 RX ADMIN — Medication SCH ML: at 08:17

## 2021-11-21 RX ADMIN — Medication SCH UNITS: at 20:15

## 2021-11-22 LAB
ANION GAP SERPL CALC-SCNC: 9 MMOL/L (ref 10–20)
BASOPHILS # BLD AUTO: 0 THOU/UL (ref 0–0.2)
BASOPHILS NFR BLD AUTO: 0.1 % (ref 0–1)
BUN SERPL-MCNC: 7 MG/DL (ref 9.8–20.1)
CALCIUM SERPL-MCNC: 8.4 MG/DL (ref 7.8–10.44)
CHLORIDE SERPL-SCNC: 110 MMOL/L (ref 98–107)
CO2 SERPL-SCNC: 25 MMOL/L (ref 23–31)
CREAT CL PREDICTED SERPL C-G-VRATE: 120 ML/MIN (ref 70–130)
EOSINOPHIL # BLD AUTO: 0.1 THOU/UL (ref 0–0.7)
EOSINOPHIL NFR BLD AUTO: 1.5 % (ref 0–10)
GLUCOSE SERPL-MCNC: 95 MG/DL (ref 83–110)
HGB BLD-MCNC: 12.5 G/DL (ref 12–16)
LYMPHOCYTES # BLD: 1.6 THOU/UL (ref 1.2–3.4)
LYMPHOCYTES NFR BLD AUTO: 27.7 % (ref 21–51)
MCH RBC QN AUTO: 32.2 PG (ref 27–31)
MCV RBC AUTO: 97.4 FL (ref 78–98)
MONOCYTES # BLD AUTO: 0.5 THOU/UL (ref 0.11–0.59)
MONOCYTES NFR BLD AUTO: 9 % (ref 0–10)
NEUTROPHILS # BLD AUTO: 3.5 THOU/UL (ref 1.4–6.5)
NEUTROPHILS NFR BLD AUTO: 61.7 % (ref 42–75)
PLATELET # BLD AUTO: 211 THOU/UL (ref 130–400)
POTASSIUM SERPL-SCNC: 3.4 MMOL/L (ref 3.5–5.1)
RBC # BLD AUTO: 3.87 MILL/UL (ref 4.2–5.4)
SODIUM SERPL-SCNC: 141 MMOL/L (ref 136–145)
WBC # BLD AUTO: 5.7 THOU/UL (ref 4.8–10.8)

## 2021-11-22 PROCEDURE — 0TC38ZZ EXTIRPATION OF MATTER FROM RIGHT KIDNEY PELVIS, VIA NATURAL OR ARTIFICIAL OPENING ENDOSCOPIC: ICD-10-PCS | Performed by: UROLOGY

## 2021-11-22 PROCEDURE — 0T768DZ DILATION OF RIGHT URETER WITH INTRALUMINAL DEVICE, VIA NATURAL OR ARTIFICIAL OPENING ENDOSCOPIC: ICD-10-PCS | Performed by: UROLOGY

## 2021-11-22 RX ADMIN — Medication SCH ML: at 08:03

## 2021-11-22 RX ADMIN — Medication SCH UNITS: at 20:23

## 2021-11-22 RX ADMIN — Medication SCH ML: at 20:25

## 2021-11-22 RX ADMIN — LACTOBACILLUS ACIDOPH-L.BULGARICUS 1 MILLION CELL CHEWABLE TABLET SCH TAB: at 20:23

## 2021-11-23 VITALS — SYSTOLIC BLOOD PRESSURE: 130 MMHG | DIASTOLIC BLOOD PRESSURE: 78 MMHG | TEMPERATURE: 98.2 F

## 2021-11-23 RX ADMIN — Medication SCH ML: at 09:10

## 2022-06-03 ENCOUNTER — HOSPITAL ENCOUNTER (INPATIENT)
Dept: HOSPITAL 92 - ERS | Age: 77
LOS: 3 days | Discharge: HOME | DRG: 689 | End: 2022-06-06
Attending: FAMILY MEDICINE | Admitting: FAMILY MEDICINE
Payer: MEDICARE

## 2022-06-03 VITALS — BODY MASS INDEX: 30.2 KG/M2

## 2022-06-03 DIAGNOSIS — G81.91: ICD-10-CM

## 2022-06-03 DIAGNOSIS — F03.90: ICD-10-CM

## 2022-06-03 DIAGNOSIS — N39.0: Primary | ICD-10-CM

## 2022-06-03 DIAGNOSIS — Z87.442: ICD-10-CM

## 2022-06-03 DIAGNOSIS — Z87.440: ICD-10-CM

## 2022-06-03 DIAGNOSIS — Z82.49: ICD-10-CM

## 2022-06-03 DIAGNOSIS — Z87.820: ICD-10-CM

## 2022-06-03 DIAGNOSIS — Z74.01: ICD-10-CM

## 2022-06-03 DIAGNOSIS — K59.09: ICD-10-CM

## 2022-06-03 DIAGNOSIS — E78.5: ICD-10-CM

## 2022-06-03 DIAGNOSIS — Z20.822: ICD-10-CM

## 2022-06-03 DIAGNOSIS — Z16.24: ICD-10-CM

## 2022-06-03 DIAGNOSIS — G93.41: ICD-10-CM

## 2022-06-03 DIAGNOSIS — Z79.899: ICD-10-CM

## 2022-06-03 DIAGNOSIS — Z87.891: ICD-10-CM

## 2022-06-03 DIAGNOSIS — I25.2: ICD-10-CM

## 2022-06-03 DIAGNOSIS — Z90.710: ICD-10-CM

## 2022-06-03 DIAGNOSIS — Z98.890: ICD-10-CM

## 2022-06-03 LAB
ALBUMIN SERPL BCG-MCNC: 3.2 G/DL (ref 3.4–4.8)
ALP SERPL-CCNC: 87 U/L (ref 40–110)
ALT SERPL W P-5'-P-CCNC: (no result) U/L (ref 8–55)
ANION GAP SERPL CALC-SCNC: 13 MMOL/L (ref 10–20)
APAP SERPL-MCNC: (no result) MCG/ML (ref 10–30)
AST SERPL-CCNC: 13 U/L (ref 5–34)
BACTERIA UR QL AUTO: (no result) HPF
BACTERIA UR QL AUTO: (no result) HPF
BASOPHILS # BLD AUTO: 0 THOU/UL (ref 0–0.2)
BASOPHILS NFR BLD AUTO: 0.6 % (ref 0–1)
BILIRUB SERPL-MCNC: 0.6 MG/DL (ref 0.2–1.2)
BUN SERPL-MCNC: 19 MG/DL (ref 9.8–20.1)
CALCIUM SERPL-MCNC: 7.9 MG/DL (ref 7.8–10.44)
CHLORIDE SERPL-SCNC: 108 MMOL/L (ref 98–107)
CO2 SERPL-SCNC: 21 MMOL/L (ref 23–31)
CREAT CL PREDICTED SERPL C-G-VRATE: 0 ML/MIN (ref 70–130)
DRUG SCREEN CUTOFF: (no result)
EOSINOPHIL # BLD AUTO: 0.1 THOU/UL (ref 0–0.7)
EOSINOPHIL NFR BLD AUTO: 0.8 % (ref 0–10)
GLOBULIN SER CALC-MCNC: 2.8 G/DL (ref 2.4–3.5)
GLUCOSE SERPL-MCNC: 88 MG/DL (ref 83–110)
HGB BLD-MCNC: 12.3 G/DL (ref 12–16)
LEUKOCYTE ESTERASE UR QL STRIP.AUTO: 500 LEU/UL
LEUKOCYTE ESTERASE UR QL STRIP.AUTO: 500 LEU/UL
LIPASE SERPL-CCNC: 18 U/L (ref 8–78)
LYMPHOCYTES # BLD: 1.9 THOU/UL (ref 1.2–3.4)
LYMPHOCYTES NFR BLD AUTO: 23.9 % (ref 21–51)
MCH RBC QN AUTO: 32.1 PG (ref 27–31)
MCV RBC AUTO: 101 FL (ref 78–98)
MONOCYTES # BLD AUTO: 0.5 THOU/UL (ref 0.11–0.59)
MONOCYTES NFR BLD AUTO: 6.5 % (ref 0–10)
NEUTROPHILS # BLD AUTO: 5.4 THOU/UL (ref 1.4–6.5)
NEUTROPHILS NFR BLD AUTO: 68.2 % (ref 42–75)
PLATELET # BLD AUTO: 255 THOU/UL (ref 130–400)
POTASSIUM SERPL-SCNC: 3.8 MMOL/L (ref 3.5–5.1)
PROT UR STRIP.AUTO-MCNC: 30 MG/DL
PROT UR STRIP.AUTO-MCNC: 70 MG/DL
RBC # BLD AUTO: 3.81 MILL/UL (ref 4.2–5.4)
RBC UR QL AUTO: (no result) HPF (ref 0–3)
RBC UR QL AUTO: (no result) HPF (ref 0–3)
SALICYLATES SERPL-MCNC: (no result) MG/DL (ref 15–30)
SODIUM SERPL-SCNC: 138 MMOL/L (ref 136–145)
SP GR UR STRIP: 1.02 (ref 1–1.04)
SP GR UR STRIP: 1.02 (ref 1–1.04)
WBC # BLD AUTO: 7.9 THOU/UL (ref 4.8–10.8)

## 2022-06-03 PROCEDURE — 36416 COLLJ CAPILLARY BLOOD SPEC: CPT

## 2022-06-03 PROCEDURE — 81015 MICROSCOPIC EXAM OF URINE: CPT

## 2022-06-03 PROCEDURE — 80053 COMPREHEN METABOLIC PANEL: CPT

## 2022-06-03 PROCEDURE — 80048 BASIC METABOLIC PNL TOTAL CA: CPT

## 2022-06-03 PROCEDURE — 36415 COLL VENOUS BLD VENIPUNCTURE: CPT

## 2022-06-03 PROCEDURE — 83690 ASSAY OF LIPASE: CPT

## 2022-06-03 PROCEDURE — 96361 HYDRATE IV INFUSION ADD-ON: CPT

## 2022-06-03 PROCEDURE — 70450 CT HEAD/BRAIN W/O DYE: CPT

## 2022-06-03 PROCEDURE — U0003 INFECTIOUS AGENT DETECTION BY NUCLEIC ACID (DNA OR RNA); SEVERE ACUTE RESPIRATORY SYNDROME CORONAVIRUS 2 (SARS-COV-2) (CORONAVIRUS DISEASE [COVID-19]), AMPLIFIED PROBE TECHNIQUE, MAKING USE OF HIGH THROUGHPUT TECHNOLOGIES AS DESCRIBED BY CMS-2020-01-R: HCPCS

## 2022-06-03 PROCEDURE — 86140 C-REACTIVE PROTEIN: CPT

## 2022-06-03 PROCEDURE — 93005 ELECTROCARDIOGRAM TRACING: CPT

## 2022-06-03 PROCEDURE — 84443 ASSAY THYROID STIM HORMONE: CPT

## 2022-06-03 PROCEDURE — 84484 ASSAY OF TROPONIN QUANT: CPT

## 2022-06-03 PROCEDURE — 80306 DRUG TEST PRSMV INSTRMNT: CPT

## 2022-06-03 PROCEDURE — 51701 INSERT BLADDER CATHETER: CPT

## 2022-06-03 PROCEDURE — 85025 COMPLETE CBC W/AUTO DIFF WBC: CPT

## 2022-06-03 PROCEDURE — 84145 PROCALCITONIN (PCT): CPT

## 2022-06-03 PROCEDURE — 87086 URINE CULTURE/COLONY COUNT: CPT

## 2022-06-03 PROCEDURE — 81003 URINALYSIS AUTO W/O SCOPE: CPT

## 2022-06-03 PROCEDURE — 71045 X-RAY EXAM CHEST 1 VIEW: CPT

## 2022-06-03 PROCEDURE — 96365 THER/PROPH/DIAG IV INF INIT: CPT

## 2022-06-03 PROCEDURE — 80307 DRUG TEST PRSMV CHEM ANLYZR: CPT

## 2022-06-03 PROCEDURE — U0005 INFEC AGEN DETEC AMPLI PROBE: HCPCS

## 2022-06-03 RX ADMIN — LACTOBACILLUS ACIDOPH-L.BULGARICUS 1 MILLION CELL CHEWABLE TABLET SCH TAB: at 21:55

## 2022-06-03 RX ADMIN — Medication SCH UNITS: at 21:55

## 2022-06-04 LAB
ANION GAP SERPL CALC-SCNC: 14 MMOL/L (ref 10–20)
BUN SERPL-MCNC: 16 MG/DL (ref 9.8–20.1)
BURR CELLS BLD QL SMEAR: (no result) (100X)
CALCIUM SERPL-MCNC: 8.2 MG/DL (ref 7.8–10.44)
CHLORIDE SERPL-SCNC: 112 MMOL/L (ref 98–107)
CO2 SERPL-SCNC: 18 MMOL/L (ref 23–31)
CREAT CL PREDICTED SERPL C-G-VRATE: 99 ML/MIN (ref 70–130)
GLUCOSE SERPL-MCNC: 85 MG/DL (ref 83–110)
HGB BLD-MCNC: 13.5 G/DL (ref 12–16)
MACROCYTES BLD QL SMEAR: (no result) (100X)
MCH RBC QN AUTO: 32.2 PG (ref 27–31)
MCV RBC AUTO: 102 FL (ref 78–98)
MDIFF COMPLETE?: YES
OVALOCYTES BLD QL SMEAR: (no result) (100X)
PLATELET # BLD AUTO: 242 THOU/UL (ref 130–400)
POLYCHROMASIA BLD QL SMEAR: (no result) (100X)
POTASSIUM SERPL-SCNC: 4.3 MMOL/L (ref 3.5–5.1)
RBC # BLD AUTO: 4.2 MILL/UL (ref 4.2–5.4)
SODIUM SERPL-SCNC: 140 MMOL/L (ref 136–145)
WBC # BLD AUTO: 7.4 THOU/UL (ref 4.8–10.8)

## 2022-06-04 RX ADMIN — Medication SCH UNITS: at 20:50

## 2022-06-04 RX ADMIN — LACTOBACILLUS ACIDOPH-L.BULGARICUS 1 MILLION CELL CHEWABLE TABLET SCH TAB: at 20:50

## 2022-06-05 RX ADMIN — Medication SCH UNITS: at 20:35

## 2022-06-05 RX ADMIN — LACTOBACILLUS ACIDOPH-L.BULGARICUS 1 MILLION CELL CHEWABLE TABLET SCH TAB: at 20:35

## 2022-06-06 VITALS — SYSTOLIC BLOOD PRESSURE: 140 MMHG | DIASTOLIC BLOOD PRESSURE: 69 MMHG | TEMPERATURE: 98.4 F

## 2022-08-05 ENCOUNTER — HOSPITAL ENCOUNTER (INPATIENT)
Dept: HOSPITAL 92 - ERS | Age: 77
LOS: 5 days | Discharge: NURSING, REHAB OR LONG-TERM CARE | DRG: 871 | End: 2022-08-10
Attending: INTERNAL MEDICINE | Admitting: FAMILY MEDICINE
Payer: MEDICARE

## 2022-08-05 VITALS — BODY MASS INDEX: 26.5 KG/M2

## 2022-08-05 DIAGNOSIS — J43.9: ICD-10-CM

## 2022-08-05 DIAGNOSIS — F17.210: ICD-10-CM

## 2022-08-05 DIAGNOSIS — N39.0: ICD-10-CM

## 2022-08-05 DIAGNOSIS — N04.9: ICD-10-CM

## 2022-08-05 DIAGNOSIS — I25.2: ICD-10-CM

## 2022-08-05 DIAGNOSIS — N20.0: ICD-10-CM

## 2022-08-05 DIAGNOSIS — Z79.899: ICD-10-CM

## 2022-08-05 DIAGNOSIS — A41.51: Primary | ICD-10-CM

## 2022-08-05 DIAGNOSIS — I25.10: ICD-10-CM

## 2022-08-05 DIAGNOSIS — I69.951: ICD-10-CM

## 2022-08-05 DIAGNOSIS — A41.59: ICD-10-CM

## 2022-08-05 DIAGNOSIS — E78.5: ICD-10-CM

## 2022-08-05 DIAGNOSIS — G93.41: ICD-10-CM

## 2022-08-05 DIAGNOSIS — F03.90: ICD-10-CM

## 2022-08-05 DIAGNOSIS — Z20.822: ICD-10-CM

## 2022-08-05 DIAGNOSIS — Z87.820: ICD-10-CM

## 2022-08-05 LAB
ALBUMIN SERPL BCG-MCNC: 3.5 G/DL (ref 3.4–4.8)
ALP SERPL-CCNC: 104 U/L (ref 40–110)
ALT SERPL W P-5'-P-CCNC: 10 U/L (ref 8–55)
ANION GAP SERPL CALC-SCNC: 12 MMOL/L (ref 10–20)
AST SERPL-CCNC: 12 U/L (ref 5–34)
BACTERIA UR QL AUTO: (no result) HPF
BASOPHILS # BLD AUTO: 0 THOU/UL (ref 0–0.2)
BASOPHILS NFR BLD AUTO: 0.1 % (ref 0–1)
BILIRUB SERPL-MCNC: 0.4 MG/DL (ref 0.2–1.2)
BUN SERPL-MCNC: 16 MG/DL (ref 9.8–20.1)
CALCIUM SERPL-MCNC: 8.5 MG/DL (ref 7.8–10.44)
CHLORIDE SERPL-SCNC: 104 MMOL/L (ref 98–107)
CO2 SERPL-SCNC: 29 MMOL/L (ref 23–31)
CREAT CL PREDICTED SERPL C-G-VRATE: 0 ML/MIN (ref 70–130)
EOSINOPHIL # BLD AUTO: 0.1 THOU/UL (ref 0–0.7)
EOSINOPHIL NFR BLD AUTO: 0.5 % (ref 0–10)
GLOBULIN SER CALC-MCNC: 3.4 G/DL (ref 2.4–3.5)
GLUCOSE SERPL-MCNC: 125 MG/DL (ref 83–110)
HGB BLD-MCNC: 12.5 G/DL (ref 12–16)
LEUKOCYTE ESTERASE UR QL STRIP.AUTO: 500 LEU/UL
LYMPHOCYTES # BLD: 1.2 THOU/UL (ref 1.2–3.4)
LYMPHOCYTES NFR BLD AUTO: 9.7 % (ref 21–51)
MCH RBC QN AUTO: 32.7 PG (ref 27–31)
MCV RBC AUTO: 100 FL (ref 78–98)
MONOCYTES # BLD AUTO: 0.9 THOU/UL (ref 0.11–0.59)
MONOCYTES NFR BLD AUTO: 7.2 % (ref 0–10)
NEUTROPHILS # BLD AUTO: 10.1 THOU/UL (ref 1.4–6.5)
NEUTROPHILS NFR BLD AUTO: 82.4 % (ref 42–75)
PLATELET # BLD AUTO: 245 THOU/UL (ref 130–400)
POTASSIUM SERPL-SCNC: 3.8 MMOL/L (ref 3.5–5.1)
PROT UR STRIP.AUTO-MCNC: 30 MG/DL
RBC # BLD AUTO: 3.83 MILL/UL (ref 4.2–5.4)
RBC UR QL AUTO: (no result) HPF (ref 0–3)
SODIUM SERPL-SCNC: 141 MMOL/L (ref 136–145)
SP GR UR STRIP: 1.02 (ref 1–1.04)
WBC # BLD AUTO: 12.3 THOU/UL (ref 4.8–10.8)

## 2022-08-05 PROCEDURE — 80202 ASSAY OF VANCOMYCIN: CPT

## 2022-08-05 PROCEDURE — 93005 ELECTROCARDIOGRAM TRACING: CPT

## 2022-08-05 PROCEDURE — 96375 TX/PRO/DX INJ NEW DRUG ADDON: CPT

## 2022-08-05 PROCEDURE — 96372 THER/PROPH/DIAG INJ SC/IM: CPT

## 2022-08-05 PROCEDURE — G0378 HOSPITAL OBSERVATION PER HR: HCPCS

## 2022-08-05 PROCEDURE — 83605 ASSAY OF LACTIC ACID: CPT

## 2022-08-05 PROCEDURE — 87040 BLOOD CULTURE FOR BACTERIA: CPT

## 2022-08-05 PROCEDURE — 71045 X-RAY EXAM CHEST 1 VIEW: CPT

## 2022-08-05 PROCEDURE — U0003 INFECTIOUS AGENT DETECTION BY NUCLEIC ACID (DNA OR RNA); SEVERE ACUTE RESPIRATORY SYNDROME CORONAVIRUS 2 (SARS-COV-2) (CORONAVIRUS DISEASE [COVID-19]), AMPLIFIED PROBE TECHNIQUE, MAKING USE OF HIGH THROUGHPUT TECHNOLOGIES AS DESCRIBED BY CMS-2020-01-R: HCPCS

## 2022-08-05 PROCEDURE — 51701 INSERT BLADDER CATHETER: CPT

## 2022-08-05 PROCEDURE — 81003 URINALYSIS AUTO W/O SCOPE: CPT

## 2022-08-05 PROCEDURE — 80048 BASIC METABOLIC PNL TOTAL CA: CPT

## 2022-08-05 PROCEDURE — 36415 COLL VENOUS BLD VENIPUNCTURE: CPT

## 2022-08-05 PROCEDURE — 96365 THER/PROPH/DIAG IV INF INIT: CPT

## 2022-08-05 PROCEDURE — 84145 PROCALCITONIN (PCT): CPT

## 2022-08-05 PROCEDURE — 81015 MICROSCOPIC EXAM OF URINE: CPT

## 2022-08-05 PROCEDURE — 87186 SC STD MICRODIL/AGAR DIL: CPT

## 2022-08-05 PROCEDURE — C1751 CATH, INF, PER/CENT/MIDLINE: HCPCS

## 2022-08-05 PROCEDURE — 96376 TX/PRO/DX INJ SAME DRUG ADON: CPT

## 2022-08-05 PROCEDURE — 85025 COMPLETE CBC W/AUTO DIFF WBC: CPT

## 2022-08-05 PROCEDURE — 80053 COMPREHEN METABOLIC PANEL: CPT

## 2022-08-05 PROCEDURE — 36569 INSJ PICC 5 YR+ W/O IMAGING: CPT

## 2022-08-05 PROCEDURE — U0005 INFEC AGEN DETEC AMPLI PROBE: HCPCS

## 2022-08-05 PROCEDURE — 87086 URINE CULTURE/COLONY COUNT: CPT

## 2022-08-05 PROCEDURE — 87077 CULTURE AEROBIC IDENTIFY: CPT

## 2022-08-05 PROCEDURE — 74176 CT ABD & PELVIS W/O CONTRAST: CPT

## 2022-08-06 RX ADMIN — DOCUSATE SODIUM 50 MG AND SENNOSIDES 8.6 MG SCH TAB: 8.6; 5 TABLET, FILM COATED ORAL at 21:00

## 2022-08-06 RX ADMIN — VANCOMYCIN HYDROCHLORIDE SCH MLS: 10 INJECTION, POWDER, LYOPHILIZED, FOR SOLUTION INTRAVENOUS at 10:10

## 2022-08-06 RX ADMIN — LACTOBACILLUS ACIDOPHILUS / LACTOBACILLUS BULGARICUS SCH GM: 100 MILLION CFU STRENGTH GRANULES at 21:44

## 2022-08-06 RX ADMIN — Medication SCH UNITS: at 21:00

## 2022-08-07 LAB
ANION GAP SERPL CALC-SCNC: 12 MMOL/L (ref 10–20)
BASOPHILS # BLD AUTO: 0 THOU/UL (ref 0–0.2)
BASOPHILS NFR BLD AUTO: 0 % (ref 0–1)
BUN SERPL-MCNC: 9 MG/DL (ref 9.8–20.1)
CALCIUM SERPL-MCNC: 8 MG/DL (ref 7.8–10.44)
CHLORIDE SERPL-SCNC: 109 MMOL/L (ref 98–107)
CO2 SERPL-SCNC: 23 MMOL/L (ref 23–31)
CREAT CL PREDICTED SERPL C-G-VRATE: 108 ML/MIN (ref 70–130)
EOSINOPHIL # BLD AUTO: 0.4 THOU/UL (ref 0–0.7)
EOSINOPHIL NFR BLD AUTO: 4.1 % (ref 0–10)
GLUCOSE SERPL-MCNC: 108 MG/DL (ref 83–110)
HGB BLD-MCNC: 11.8 G/DL (ref 12–16)
LYMPHOCYTES # BLD: 1.3 THOU/UL (ref 1.2–3.4)
LYMPHOCYTES NFR BLD AUTO: 13.6 % (ref 21–51)
MCH RBC QN AUTO: 32.9 PG (ref 27–31)
MCV RBC AUTO: 101 FL (ref 78–98)
MONOCYTES # BLD AUTO: 0.8 THOU/UL (ref 0.11–0.59)
MONOCYTES NFR BLD AUTO: 8 % (ref 0–10)
NEUTROPHILS # BLD AUTO: 7.2 THOU/UL (ref 1.4–6.5)
NEUTROPHILS NFR BLD AUTO: 74.3 % (ref 42–75)
PLATELET # BLD AUTO: 218 THOU/UL (ref 130–400)
POTASSIUM SERPL-SCNC: 4.1 MMOL/L (ref 3.5–5.1)
RBC # BLD AUTO: 3.57 MILL/UL (ref 4.2–5.4)
SODIUM SERPL-SCNC: 140 MMOL/L (ref 136–145)
WBC # BLD AUTO: 9.7 THOU/UL (ref 4.8–10.8)

## 2022-08-07 RX ADMIN — DOCUSATE SODIUM 50 MG AND SENNOSIDES 8.6 MG SCH TAB: 8.6; 5 TABLET, FILM COATED ORAL at 21:30

## 2022-08-07 RX ADMIN — LACTOBACILLUS ACIDOPHILUS / LACTOBACILLUS BULGARICUS SCH GM: 100 MILLION CFU STRENGTH GRANULES at 21:30

## 2022-08-07 RX ADMIN — Medication SCH UNITS: at 21:29

## 2022-08-07 RX ADMIN — VANCOMYCIN HYDROCHLORIDE SCH MLS: 10 INJECTION, POWDER, LYOPHILIZED, FOR SOLUTION INTRAVENOUS at 08:19

## 2022-08-07 RX ADMIN — DOCUSATE SODIUM 50 MG AND SENNOSIDES 8.6 MG SCH TAB: 8.6; 5 TABLET, FILM COATED ORAL at 08:19

## 2022-08-08 LAB — VANCOMYCIN TROUGH SERPL-MCNC: 11.2 UG/ML

## 2022-08-08 PROCEDURE — 3E03329 INTRODUCTION OF OTHER ANTI-INFECTIVE INTO PERIPHERAL VEIN, PERCUTANEOUS APPROACH: ICD-10-PCS

## 2022-08-08 PROCEDURE — B548ZZA ULTRASONOGRAPHY OF SUPERIOR VENA CAVA, GUIDANCE: ICD-10-PCS | Performed by: ALLERGY & IMMUNOLOGY

## 2022-08-08 PROCEDURE — 02HV33Z INSERTION OF INFUSION DEVICE INTO SUPERIOR VENA CAVA, PERCUTANEOUS APPROACH: ICD-10-PCS | Performed by: ALLERGY & IMMUNOLOGY

## 2022-08-08 RX ADMIN — VANCOMYCIN HYDROCHLORIDE SCH MLS: 10 INJECTION, POWDER, LYOPHILIZED, FOR SOLUTION INTRAVENOUS at 09:33

## 2022-08-08 RX ADMIN — DOCUSATE SODIUM 50 MG AND SENNOSIDES 8.6 MG SCH TAB: 8.6; 5 TABLET, FILM COATED ORAL at 20:59

## 2022-08-08 RX ADMIN — DOCUSATE SODIUM 50 MG AND SENNOSIDES 8.6 MG SCH TAB: 8.6; 5 TABLET, FILM COATED ORAL at 08:31

## 2022-08-08 RX ADMIN — Medication SCH UNITS: at 20:59

## 2022-08-08 RX ADMIN — LACTOBACILLUS ACIDOPHILUS / LACTOBACILLUS BULGARICUS SCH GM: 100 MILLION CFU STRENGTH GRANULES at 20:59

## 2022-08-08 RX ADMIN — Medication SCH MG: at 08:31

## 2022-08-09 RX ADMIN — DOCUSATE SODIUM 50 MG AND SENNOSIDES 8.6 MG SCH TAB: 8.6; 5 TABLET, FILM COATED ORAL at 08:33

## 2022-08-09 RX ADMIN — LACTOBACILLUS ACIDOPHILUS / LACTOBACILLUS BULGARICUS SCH GM: 100 MILLION CFU STRENGTH GRANULES at 20:37

## 2022-08-09 RX ADMIN — Medication SCH MG: at 08:33

## 2022-08-09 RX ADMIN — Medication SCH UNITS: at 20:36

## 2022-08-09 RX ADMIN — DOCUSATE SODIUM 50 MG AND SENNOSIDES 8.6 MG SCH TAB: 8.6; 5 TABLET, FILM COATED ORAL at 20:36

## 2022-08-10 ENCOUNTER — HOSPITAL ENCOUNTER (INPATIENT)
Dept: HOSPITAL 18 - NAV ACUTE | Age: 77
LOS: 27 days | Discharge: NURSING, REHAB OR LONG-TERM CARE | DRG: 690 | End: 2022-09-06
Attending: STUDENT IN AN ORGANIZED HEALTH CARE EDUCATION/TRAINING PROGRAM | Admitting: STUDENT IN AN ORGANIZED HEALTH CARE EDUCATION/TRAINING PROGRAM
Payer: MEDICARE

## 2022-08-10 VITALS — BODY MASS INDEX: 29 KG/M2

## 2022-08-10 VITALS — SYSTOLIC BLOOD PRESSURE: 159 MMHG | TEMPERATURE: 97.9 F | DIASTOLIC BLOOD PRESSURE: 82 MMHG

## 2022-08-10 DIAGNOSIS — D50.9: ICD-10-CM

## 2022-08-10 DIAGNOSIS — R53.81: ICD-10-CM

## 2022-08-10 DIAGNOSIS — Z16.35: ICD-10-CM

## 2022-08-10 DIAGNOSIS — Z20.822: ICD-10-CM

## 2022-08-10 DIAGNOSIS — Z87.442: ICD-10-CM

## 2022-08-10 DIAGNOSIS — E78.5: ICD-10-CM

## 2022-08-10 DIAGNOSIS — K59.09: ICD-10-CM

## 2022-08-10 DIAGNOSIS — Z79.899: ICD-10-CM

## 2022-08-10 DIAGNOSIS — E86.0: ICD-10-CM

## 2022-08-10 DIAGNOSIS — B96.4: ICD-10-CM

## 2022-08-10 DIAGNOSIS — Z87.820: ICD-10-CM

## 2022-08-10 DIAGNOSIS — Z90.710: ICD-10-CM

## 2022-08-10 DIAGNOSIS — Z86.73: ICD-10-CM

## 2022-08-10 DIAGNOSIS — F03.91: ICD-10-CM

## 2022-08-10 DIAGNOSIS — I10: ICD-10-CM

## 2022-08-10 DIAGNOSIS — N39.0: Primary | ICD-10-CM

## 2022-08-10 LAB
ANION GAP SERPL CALC-SCNC: 14 MMOL/L (ref 10–20)
BASOPHILS # BLD AUTO: 0 THOU/UL (ref 0–0.2)
BASOPHILS NFR BLD AUTO: 0.4 % (ref 0–1)
BUN SERPL-MCNC: 11 MG/DL (ref 9.8–20.1)
CALCIUM SERPL-MCNC: 8.5 MG/DL (ref 7.8–10.44)
CHLORIDE SERPL-SCNC: 109 MMOL/L (ref 98–107)
CO2 SERPL-SCNC: 24 MMOL/L (ref 23–31)
CREAT CL PREDICTED SERPL C-G-VRATE: 114 ML/MIN (ref 70–130)
EOSINOPHIL # BLD AUTO: 0.3 THOU/UL (ref 0–0.7)
EOSINOPHIL NFR BLD AUTO: 3.3 % (ref 0–10)
GLUCOSE SERPL-MCNC: 103 MG/DL (ref 83–110)
HGB BLD-MCNC: 11.5 G/DL (ref 12–16)
LYMPHOCYTES # BLD: 1.4 THOU/UL (ref 1.2–3.4)
LYMPHOCYTES NFR BLD AUTO: 17 % (ref 21–51)
MCH RBC QN AUTO: 31.7 PG (ref 27–31)
MCV RBC AUTO: 102 FL (ref 78–98)
MONOCYTES # BLD AUTO: 0.8 THOU/UL (ref 0.11–0.59)
MONOCYTES NFR BLD AUTO: 10.1 % (ref 0–10)
NEUTROPHILS # BLD AUTO: 5.5 THOU/UL (ref 1.4–6.5)
NEUTROPHILS NFR BLD AUTO: 69.2 % (ref 42–75)
PLATELET # BLD AUTO: 253 THOU/UL (ref 130–400)
POTASSIUM SERPL-SCNC: 3.8 MMOL/L (ref 3.5–5.1)
RBC # BLD AUTO: 3.64 MILL/UL (ref 4.2–5.4)
SODIUM SERPL-SCNC: 143 MMOL/L (ref 136–145)
WBC # BLD AUTO: 7.9 THOU/UL (ref 4.8–10.8)

## 2022-08-10 PROCEDURE — 81003 URINALYSIS AUTO W/O SCOPE: CPT

## 2022-08-10 PROCEDURE — 36415 COLL VENOUS BLD VENIPUNCTURE: CPT

## 2022-08-10 PROCEDURE — 82140 ASSAY OF AMMONIA: CPT

## 2022-08-10 PROCEDURE — U0003 INFECTIOUS AGENT DETECTION BY NUCLEIC ACID (DNA OR RNA); SEVERE ACUTE RESPIRATORY SYNDROME CORONAVIRUS 2 (SARS-COV-2) (CORONAVIRUS DISEASE [COVID-19]), AMPLIFIED PROBE TECHNIQUE, MAKING USE OF HIGH THROUGHPUT TECHNOLOGIES AS DESCRIBED BY CMS-2020-01-R: HCPCS

## 2022-08-10 PROCEDURE — U0005 INFEC AGEN DETEC AMPLI PROBE: HCPCS

## 2022-08-10 PROCEDURE — 85025 COMPLETE CBC W/AUTO DIFF WBC: CPT

## 2022-08-10 PROCEDURE — 71045 X-RAY EXAM CHEST 1 VIEW: CPT

## 2022-08-10 PROCEDURE — 80048 BASIC METABOLIC PNL TOTAL CA: CPT

## 2022-08-10 RX ADMIN — Medication SCH UNITS: at 21:42

## 2022-08-10 RX ADMIN — LACTOBACILLUS ACIDOPHILUS / LACTOBACILLUS BULGARICUS SCH GM: 100 MILLION CFU STRENGTH GRANULES at 21:45

## 2022-08-10 RX ADMIN — DOCUSATE SODIUM 50 MG AND SENNOSIDES 8.6 MG SCH TAB: 8.6; 5 TABLET, FILM COATED ORAL at 10:18

## 2022-08-10 RX ADMIN — Medication SCH MG: at 10:18

## 2022-08-11 LAB
ANION GAP SERPL CALC-SCNC: 13 MMOL/L (ref 10–20)
BASOPHILS # BLD AUTO: 0.1 THOU/UL (ref 0–0.2)
BASOPHILS NFR BLD AUTO: 1 % (ref 0–1)
BUN SERPL-MCNC: 10 MG/DL (ref 9.8–20.1)
CALCIUM SERPL-MCNC: 8.7 MG/DL (ref 7.8–10.44)
CHLORIDE SERPL-SCNC: 104 MMOL/L (ref 98–107)
CO2 SERPL-SCNC: 29 MMOL/L (ref 23–31)
CREAT CL PREDICTED SERPL C-G-VRATE: 121 ML/MIN (ref 70–130)
EOSINOPHIL # BLD AUTO: 0.2 THOU/UL (ref 0–0.7)
EOSINOPHIL NFR BLD AUTO: 2 % (ref 0–10)
GLUCOSE SERPL-MCNC: 118 MG/DL (ref 83–110)
HGB BLD-MCNC: 10.9 G/DL (ref 12–16)
LYMPHOCYTES # BLD AUTO: 1 THOU/UL (ref 1.2–3.4)
LYMPHOCYTES NFR BLD AUTO: 12.4 % (ref 21–51)
MCH RBC QN AUTO: 30.5 PG (ref 27–31)
MCV RBC AUTO: 98.5 FL (ref 78–98)
MONOCYTES # BLD AUTO: 0.5 THOU/UL (ref 0.11–0.59)
MONOCYTES NFR BLD AUTO: 6.1 % (ref 0–10)
NEUTROPHILS # BLD AUTO: 6.1 THOU/UL (ref 1.4–6.5)
NEUTROPHILS NFR BLD AUTO: 78.5 % (ref 42–75)
PLATELET # BLD AUTO: 270 THOU/UL (ref 130–400)
POTASSIUM SERPL-SCNC: 3.8 MMOL/L (ref 3.5–5.1)
RBC # BLD AUTO: 3.56 MILL/UL (ref 4.2–5.4)
SODIUM SERPL-SCNC: 142 MMOL/L (ref 136–145)
WBC # BLD AUTO: 7.8 THOU/UL (ref 4.8–10.8)

## 2022-08-11 RX ADMIN — Medication SCH MG: at 09:01

## 2022-08-11 RX ADMIN — Medication SCH UNITS: at 20:31

## 2022-08-11 RX ADMIN — LACTOBACILLUS ACIDOPHILUS / LACTOBACILLUS BULGARICUS SCH GM: 100 MILLION CFU STRENGTH GRANULES at 20:31

## 2022-08-12 RX ADMIN — LACTOBACILLUS ACIDOPHILUS / LACTOBACILLUS BULGARICUS SCH GM: 100 MILLION CFU STRENGTH GRANULES at 20:20

## 2022-08-12 RX ADMIN — Medication SCH MG: at 09:09

## 2022-08-12 RX ADMIN — Medication SCH UNITS: at 20:20

## 2022-08-13 RX ADMIN — Medication SCH MG: at 08:10

## 2022-08-13 RX ADMIN — Medication SCH UNITS: at 20:59

## 2022-08-13 RX ADMIN — LACTOBACILLUS ACIDOPHILUS / LACTOBACILLUS BULGARICUS SCH GM: 100 MILLION CFU STRENGTH GRANULES at 20:58

## 2022-08-14 LAB
ANION GAP SERPL CALC-SCNC: 13 MMOL/L (ref 10–20)
BASOPHILS # BLD AUTO: 0.1 THOU/UL (ref 0–0.2)
BASOPHILS NFR BLD AUTO: 1.2 % (ref 0–1)
BUN SERPL-MCNC: 11 MG/DL (ref 9.8–20.1)
CALCIUM SERPL-MCNC: 8.6 MG/DL (ref 7.8–10.44)
CHLORIDE SERPL-SCNC: 107 MMOL/L (ref 98–107)
CO2 SERPL-SCNC: 29 MMOL/L (ref 23–31)
CREAT CL PREDICTED SERPL C-G-VRATE: 119 ML/MIN (ref 70–130)
EOSINOPHIL # BLD AUTO: 0.1 THOU/UL (ref 0–0.7)
EOSINOPHIL NFR BLD AUTO: 1 % (ref 0–10)
GLUCOSE SERPL-MCNC: 95 MG/DL (ref 83–110)
HGB BLD-MCNC: 10.2 G/DL (ref 12–16)
LYMPHOCYTES # BLD AUTO: 1.6 THOU/UL (ref 1.2–3.4)
LYMPHOCYTES NFR BLD AUTO: 21 % (ref 21–51)
MCH RBC QN AUTO: 30.4 PG (ref 27–31)
MCV RBC AUTO: 98 FL (ref 78–98)
MONOCYTES # BLD AUTO: 0.7 THOU/UL (ref 0.11–0.59)
MONOCYTES NFR BLD AUTO: 9.6 % (ref 0–10)
NEUTROPHILS # BLD AUTO: 5.2 THOU/UL (ref 1.4–6.5)
NEUTROPHILS NFR BLD AUTO: 67.2 % (ref 42–75)
PLATELET # BLD AUTO: 319 THOU/UL (ref 130–400)
POTASSIUM SERPL-SCNC: 3.6 MMOL/L (ref 3.5–5.1)
RBC # BLD AUTO: 3.35 MILL/UL (ref 4.2–5.4)
SODIUM SERPL-SCNC: 145 MMOL/L (ref 136–145)
WBC # BLD AUTO: 7.7 THOU/UL (ref 4.8–10.8)

## 2022-08-14 RX ADMIN — LACTOBACILLUS ACIDOPHILUS / LACTOBACILLUS BULGARICUS SCH GM: 100 MILLION CFU STRENGTH GRANULES at 20:32

## 2022-08-14 RX ADMIN — Medication SCH UNITS: at 20:32

## 2022-08-14 RX ADMIN — Medication SCH MG: at 08:27

## 2022-08-14 RX ADMIN — Medication SCH EACH: at 09:31

## 2022-08-15 RX ADMIN — Medication SCH UNITS: at 21:35

## 2022-08-15 RX ADMIN — Medication SCH ML: at 07:49

## 2022-08-15 RX ADMIN — Medication SCH ML: at 21:40

## 2022-08-15 RX ADMIN — Medication SCH MG: at 07:49

## 2022-08-15 RX ADMIN — LACTOBACILLUS ACIDOPHILUS / LACTOBACILLUS BULGARICUS SCH GM: 100 MILLION CFU STRENGTH GRANULES at 21:36

## 2022-08-15 RX ADMIN — Medication SCH EACH: at 07:51

## 2022-08-16 RX ADMIN — Medication SCH MG: at 08:22

## 2022-08-16 RX ADMIN — LACTOBACILLUS ACIDOPHILUS / LACTOBACILLUS BULGARICUS SCH GM: 100 MILLION CFU STRENGTH GRANULES at 20:49

## 2022-08-16 RX ADMIN — Medication SCH: at 08:23

## 2022-08-16 RX ADMIN — Medication SCH EACH: at 08:25

## 2022-08-16 RX ADMIN — Medication SCH UNITS: at 20:49

## 2022-08-16 RX ADMIN — Medication SCH ML: at 20:52

## 2022-08-17 LAB
ANION GAP SERPL CALC-SCNC: 14 MMOL/L (ref 10–20)
BASOPHILS # BLD AUTO: 0.1 THOU/UL (ref 0–0.2)
BASOPHILS NFR BLD AUTO: 1.5 % (ref 0–1)
BUN SERPL-MCNC: 12 MG/DL (ref 9.8–20.1)
CALCIUM SERPL-MCNC: 8.6 MG/DL (ref 7.8–10.44)
CHLORIDE SERPL-SCNC: 106 MMOL/L (ref 98–107)
CO2 SERPL-SCNC: 28 MMOL/L (ref 23–31)
CREAT CL PREDICTED SERPL C-G-VRATE: 115 ML/MIN (ref 70–130)
EOSINOPHIL # BLD AUTO: 0.1 THOU/UL (ref 0–0.7)
EOSINOPHIL NFR BLD AUTO: 1.4 % (ref 0–10)
GLUCOSE SERPL-MCNC: 95 MG/DL (ref 83–110)
HGB BLD-MCNC: 10.2 G/DL (ref 12–16)
LYMPHOCYTES # BLD AUTO: 1.7 THOU/UL (ref 1.2–3.4)
LYMPHOCYTES NFR BLD AUTO: 24.5 % (ref 21–51)
MCH RBC QN AUTO: 30.6 PG (ref 27–31)
MCV RBC AUTO: 96.8 FL (ref 78–98)
MONOCYTES # BLD AUTO: 0.7 THOU/UL (ref 0.11–0.59)
MONOCYTES NFR BLD AUTO: 10 % (ref 0–10)
NEUTROPHILS # BLD AUTO: 4.4 THOU/UL (ref 1.4–6.5)
NEUTROPHILS NFR BLD AUTO: 62.6 % (ref 42–75)
PLATELET # BLD AUTO: 345 THOU/UL (ref 130–400)
POTASSIUM SERPL-SCNC: 4 MMOL/L (ref 3.5–5.1)
RBC # BLD AUTO: 3.35 MILL/UL (ref 4.2–5.4)
SODIUM SERPL-SCNC: 144 MMOL/L (ref 136–145)
SP GR UR STRIP: 1.02 (ref 1–1.03)
WBC # BLD AUTO: 7 THOU/UL (ref 4.8–10.8)

## 2022-08-17 RX ADMIN — LACTOBACILLUS ACIDOPHILUS / LACTOBACILLUS BULGARICUS SCH GM: 100 MILLION CFU STRENGTH GRANULES at 21:07

## 2022-08-17 RX ADMIN — Medication SCH ML: at 21:07

## 2022-08-17 RX ADMIN — Medication SCH EACH: at 08:28

## 2022-08-17 RX ADMIN — Medication SCH MG: at 07:36

## 2022-08-17 RX ADMIN — Medication SCH: at 07:38

## 2022-08-17 RX ADMIN — Medication SCH UNITS: at 21:07

## 2022-08-18 LAB
BASOPHILS # BLD AUTO: 0.1 THOU/UL (ref 0–0.2)
BASOPHILS NFR BLD AUTO: 0.9 % (ref 0–1)
EOSINOPHIL # BLD AUTO: 0.1 THOU/UL (ref 0–0.7)
EOSINOPHIL NFR BLD AUTO: 1.4 % (ref 0–10)
HGB BLD-MCNC: 12 G/DL (ref 12–16)
LYMPHOCYTES # BLD AUTO: 1.6 THOU/UL (ref 1.2–3.4)
LYMPHOCYTES NFR BLD AUTO: 20.2 % (ref 21–51)
MCH RBC QN AUTO: 31 PG (ref 27–31)
MCV RBC AUTO: 95.5 FL (ref 78–98)
MONOCYTES # BLD AUTO: 0.8 THOU/UL (ref 0.11–0.59)
MONOCYTES NFR BLD AUTO: 10 % (ref 0–10)
NEUTROPHILS # BLD AUTO: 5.3 THOU/UL (ref 1.4–6.5)
NEUTROPHILS NFR BLD AUTO: 67.6 % (ref 42–75)
PLATELET # BLD AUTO: 368 THOU/UL (ref 130–400)
RBC # BLD AUTO: 3.88 MILL/UL (ref 4.2–5.4)
WBC # BLD AUTO: 7.8 THOU/UL (ref 4.8–10.8)

## 2022-08-18 RX ADMIN — Medication SCH UNITS: at 21:12

## 2022-08-18 RX ADMIN — Medication SCH MG: at 08:25

## 2022-08-18 RX ADMIN — LACTOBACILLUS ACIDOPHILUS / LACTOBACILLUS BULGARICUS SCH GM: 100 MILLION CFU STRENGTH GRANULES at 21:11

## 2022-08-18 RX ADMIN — Medication SCH EACH: at 08:25

## 2022-08-18 RX ADMIN — Medication SCH ML: at 08:25

## 2022-08-18 RX ADMIN — Medication SCH ML: at 21:16

## 2022-08-19 RX ADMIN — DOCUSATE SODIUM 50 MG AND SENNOSIDES 8.6 MG PRN TAB: 8.6; 5 TABLET, FILM COATED ORAL at 21:48

## 2022-08-19 RX ADMIN — Medication SCH UNITS: at 21:48

## 2022-08-19 RX ADMIN — LACTOBACILLUS ACIDOPHILUS / LACTOBACILLUS BULGARICUS SCH GM: 100 MILLION CFU STRENGTH GRANULES at 21:48

## 2022-08-19 RX ADMIN — Medication SCH MG: at 07:50

## 2022-08-19 RX ADMIN — Medication SCH ML: at 07:50

## 2022-08-19 RX ADMIN — Medication SCH ML: at 21:47

## 2022-08-19 RX ADMIN — Medication SCH EACH: at 07:50

## 2022-08-20 LAB
ANION GAP SERPL CALC-SCNC: 14 MMOL/L (ref 10–20)
BASOPHILS # BLD AUTO: 0.1 THOU/UL (ref 0–0.2)
BASOPHILS NFR BLD AUTO: 0.8 % (ref 0–1)
BUN SERPL-MCNC: 14 MG/DL (ref 9.8–20.1)
CALCIUM SERPL-MCNC: 8.6 MG/DL (ref 7.8–10.44)
CHLORIDE SERPL-SCNC: 105 MMOL/L (ref 98–107)
CO2 SERPL-SCNC: 27 MMOL/L (ref 23–31)
CREAT CL PREDICTED SERPL C-G-VRATE: 119 ML/MIN (ref 70–130)
EOSINOPHIL # BLD AUTO: 0.1 THOU/UL (ref 0–0.7)
EOSINOPHIL NFR BLD AUTO: 1.4 % (ref 0–10)
GLUCOSE SERPL-MCNC: 102 MG/DL (ref 83–110)
HGB BLD-MCNC: 11.1 G/DL (ref 12–16)
LYMPHOCYTES # BLD AUTO: 1.3 THOU/UL (ref 1.2–3.4)
LYMPHOCYTES NFR BLD AUTO: 18.7 % (ref 21–51)
MCH RBC QN AUTO: 29.8 PG (ref 27–31)
MCV RBC AUTO: 97.3 FL (ref 78–98)
MONOCYTES # BLD AUTO: 0.7 THOU/UL (ref 0.11–0.59)
MONOCYTES NFR BLD AUTO: 10 % (ref 0–10)
NEUTROPHILS # BLD AUTO: 4.8 THOU/UL (ref 1.4–6.5)
NEUTROPHILS NFR BLD AUTO: 69.1 % (ref 42–75)
PLATELET # BLD AUTO: 405 THOU/UL (ref 130–400)
POTASSIUM SERPL-SCNC: 3.9 MMOL/L (ref 3.5–5.1)
RBC # BLD AUTO: 3.73 MILL/UL (ref 4.2–5.4)
SODIUM SERPL-SCNC: 142 MMOL/L (ref 136–145)
WBC # BLD AUTO: 7 THOU/UL (ref 4.8–10.8)

## 2022-08-20 RX ADMIN — LACTOBACILLUS ACIDOPHILUS / LACTOBACILLUS BULGARICUS SCH GM: 100 MILLION CFU STRENGTH GRANULES at 21:36

## 2022-08-20 RX ADMIN — Medication SCH MG: at 08:39

## 2022-08-20 RX ADMIN — Medication SCH ML: at 21:35

## 2022-08-20 RX ADMIN — Medication SCH UNITS: at 21:37

## 2022-08-20 RX ADMIN — Medication SCH EACH: at 08:39

## 2022-08-20 RX ADMIN — Medication SCH ML: at 08:39

## 2022-08-21 RX ADMIN — Medication SCH ML: at 21:17

## 2022-08-21 RX ADMIN — Medication SCH EACH: at 08:53

## 2022-08-21 RX ADMIN — LACTOBACILLUS ACIDOPHILUS / LACTOBACILLUS BULGARICUS SCH GM: 100 MILLION CFU STRENGTH GRANULES at 21:16

## 2022-08-21 RX ADMIN — Medication SCH ML: at 08:53

## 2022-08-21 RX ADMIN — Medication SCH UNITS: at 21:17

## 2022-08-21 RX ADMIN — Medication SCH MG: at 08:53

## 2022-08-22 RX ADMIN — Medication SCH UNITS: at 21:18

## 2022-08-22 RX ADMIN — Medication SCH ML: at 21:18

## 2022-08-22 RX ADMIN — Medication SCH ML: at 08:18

## 2022-08-22 RX ADMIN — Medication SCH EACH: at 08:14

## 2022-08-22 RX ADMIN — LACTOBACILLUS ACIDOPHILUS / LACTOBACILLUS BULGARICUS SCH GM: 100 MILLION CFU STRENGTH GRANULES at 21:18

## 2022-08-22 RX ADMIN — Medication SCH MG: at 08:13

## 2022-08-23 LAB
ANION GAP SERPL CALC-SCNC: 14 MMOL/L (ref 10–20)
BASOPHILS # BLD AUTO: 0.1 THOU/UL (ref 0–0.2)
BASOPHILS NFR BLD AUTO: 0.9 % (ref 0–1)
BUN SERPL-MCNC: 11 MG/DL (ref 9.8–20.1)
CALCIUM SERPL-MCNC: 8.8 MG/DL (ref 7.8–10.44)
CHLORIDE SERPL-SCNC: 103 MMOL/L (ref 98–107)
CO2 SERPL-SCNC: 26 MMOL/L (ref 23–31)
CREAT CL PREDICTED SERPL C-G-VRATE: 116 ML/MIN (ref 70–130)
EOSINOPHIL # BLD AUTO: 0.1 THOU/UL (ref 0–0.7)
EOSINOPHIL NFR BLD AUTO: 1 % (ref 0–10)
GLUCOSE SERPL-MCNC: 110 MG/DL (ref 83–110)
HGB BLD-MCNC: 11.5 G/DL (ref 12–16)
LYMPHOCYTES # BLD AUTO: 1.4 THOU/UL (ref 1.2–3.4)
LYMPHOCYTES NFR BLD AUTO: 18.1 % (ref 21–51)
MCH RBC QN AUTO: 30.6 PG (ref 27–31)
MCV RBC AUTO: 96.4 FL (ref 78–98)
MONOCYTES # BLD AUTO: 0.8 THOU/UL (ref 0.11–0.59)
MONOCYTES NFR BLD AUTO: 10.4 % (ref 0–10)
NEUTROPHILS # BLD AUTO: 5.2 THOU/UL (ref 1.4–6.5)
NEUTROPHILS NFR BLD AUTO: 69.6 % (ref 42–75)
PLATELET # BLD AUTO: 425 THOU/UL (ref 130–400)
POTASSIUM SERPL-SCNC: 3.9 MMOL/L (ref 3.5–5.1)
RBC # BLD AUTO: 3.75 MILL/UL (ref 4.2–5.4)
SODIUM SERPL-SCNC: 139 MMOL/L (ref 136–145)
WBC # BLD AUTO: 7.5 THOU/UL (ref 4.8–10.8)

## 2022-08-23 RX ADMIN — Medication SCH MG: at 08:18

## 2022-08-23 RX ADMIN — Medication SCH ML: at 08:19

## 2022-08-23 RX ADMIN — DOCUSATE SODIUM 50 MG AND SENNOSIDES 8.6 MG PRN TAB: 8.6; 5 TABLET, FILM COATED ORAL at 08:18

## 2022-08-23 RX ADMIN — LACTOBACILLUS ACIDOPHILUS / LACTOBACILLUS BULGARICUS SCH GM: 100 MILLION CFU STRENGTH GRANULES at 20:26

## 2022-08-23 RX ADMIN — Medication SCH UNITS: at 20:26

## 2022-08-23 RX ADMIN — Medication SCH ML: at 21:37

## 2022-08-23 RX ADMIN — Medication SCH EACH: at 08:19

## 2022-08-24 RX ADMIN — LACTOBACILLUS ACIDOPHILUS / LACTOBACILLUS BULGARICUS SCH GM: 100 MILLION CFU STRENGTH GRANULES at 20:28

## 2022-08-24 RX ADMIN — Medication SCH ML: at 20:45

## 2022-08-24 RX ADMIN — Medication SCH: at 09:19

## 2022-08-24 RX ADMIN — Medication SCH UNITS: at 20:43

## 2022-08-24 RX ADMIN — Medication SCH ML: at 09:30

## 2022-08-24 RX ADMIN — Medication SCH EACH: at 09:20

## 2022-08-25 RX ADMIN — LACTOBACILLUS ACIDOPHILUS / LACTOBACILLUS BULGARICUS SCH GM: 100 MILLION CFU STRENGTH GRANULES at 20:58

## 2022-08-25 RX ADMIN — Medication SCH UNITS: at 20:58

## 2022-08-25 RX ADMIN — NYSTATIN SCH APPLIC: 100000 POWDER TOPICAL at 20:58

## 2022-08-25 RX ADMIN — Medication SCH MG: at 09:09

## 2022-08-25 RX ADMIN — Medication SCH ML: at 20:59

## 2022-08-25 RX ADMIN — Medication SCH EACH: at 08:25

## 2022-08-25 RX ADMIN — Medication SCH ML: at 09:09

## 2022-08-26 LAB
ANION GAP SERPL CALC-SCNC: 15 MMOL/L (ref 10–20)
BASOPHILS # BLD AUTO: 0.1 THOU/UL (ref 0–0.2)
BASOPHILS NFR BLD AUTO: 1.1 % (ref 0–1)
BUN SERPL-MCNC: 10 MG/DL (ref 9.8–20.1)
CALCIUM SERPL-MCNC: 8.7 MG/DL (ref 7.8–10.44)
CHLORIDE SERPL-SCNC: 105 MMOL/L (ref 98–107)
CO2 SERPL-SCNC: 26 MMOL/L (ref 23–31)
CREAT CL PREDICTED SERPL C-G-VRATE: 109 ML/MIN (ref 70–130)
EOSINOPHIL # BLD AUTO: 0.1 THOU/UL (ref 0–0.7)
EOSINOPHIL NFR BLD AUTO: 1.6 % (ref 0–10)
GLUCOSE SERPL-MCNC: 96 MG/DL (ref 83–110)
HGB BLD-MCNC: 11.4 G/DL (ref 12–16)
LYMPHOCYTES # BLD AUTO: 1.4 THOU/UL (ref 1.2–3.4)
LYMPHOCYTES NFR BLD AUTO: 20.9 % (ref 21–51)
MCH RBC QN AUTO: 30.5 PG (ref 27–31)
MCV RBC AUTO: 97 FL (ref 78–98)
MONOCYTES # BLD AUTO: 0.6 THOU/UL (ref 0.11–0.59)
MONOCYTES NFR BLD AUTO: 9.6 % (ref 0–10)
NEUTROPHILS # BLD AUTO: 4.5 THOU/UL (ref 1.4–6.5)
NEUTROPHILS NFR BLD AUTO: 66.8 % (ref 42–75)
PLATELET # BLD AUTO: 337 THOU/UL (ref 130–400)
POTASSIUM SERPL-SCNC: 3.8 MMOL/L (ref 3.5–5.1)
RBC # BLD AUTO: 3.75 MILL/UL (ref 4.2–5.4)
SODIUM SERPL-SCNC: 142 MMOL/L (ref 136–145)
WBC # BLD AUTO: 6.7 THOU/UL (ref 4.8–10.8)

## 2022-08-26 RX ADMIN — Medication SCH ML: at 09:16

## 2022-08-26 RX ADMIN — Medication SCH ML: at 21:00

## 2022-08-26 RX ADMIN — LACTOBACILLUS ACIDOPHILUS / LACTOBACILLUS BULGARICUS SCH GM: 100 MILLION CFU STRENGTH GRANULES at 20:42

## 2022-08-26 RX ADMIN — Medication SCH UNITS: at 20:42

## 2022-08-26 RX ADMIN — NYSTATIN SCH APPLIC: 100000 POWDER TOPICAL at 21:00

## 2022-08-26 RX ADMIN — NYSTATIN SCH APPLIC: 100000 POWDER TOPICAL at 07:57

## 2022-08-26 RX ADMIN — Medication SCH MG: at 07:57

## 2022-08-26 RX ADMIN — Medication SCH EACH: at 07:57

## 2022-08-27 RX ADMIN — Medication SCH UNITS: at 20:48

## 2022-08-27 RX ADMIN — Medication SCH MG: at 08:17

## 2022-08-27 RX ADMIN — Medication SCH ML: at 08:17

## 2022-08-27 RX ADMIN — LACTOBACILLUS ACIDOPHILUS / LACTOBACILLUS BULGARICUS SCH GM: 100 MILLION CFU STRENGTH GRANULES at 20:48

## 2022-08-27 RX ADMIN — NYSTATIN SCH APPLIC: 100000 POWDER TOPICAL at 20:59

## 2022-08-27 RX ADMIN — Medication SCH EACH: at 08:18

## 2022-08-27 RX ADMIN — Medication SCH ML: at 20:59

## 2022-08-27 RX ADMIN — NYSTATIN SCH APPLIC: 100000 POWDER TOPICAL at 08:17

## 2022-08-28 RX ADMIN — Medication SCH ML: at 20:37

## 2022-08-28 RX ADMIN — Medication SCH EACH: at 08:09

## 2022-08-28 RX ADMIN — Medication SCH MG: at 08:09

## 2022-08-28 RX ADMIN — DOCUSATE SODIUM 50 MG AND SENNOSIDES 8.6 MG PRN TAB: 8.6; 5 TABLET, FILM COATED ORAL at 14:40

## 2022-08-28 RX ADMIN — NYSTATIN SCH APPLIC: 100000 POWDER TOPICAL at 20:36

## 2022-08-28 RX ADMIN — LACTOBACILLUS ACIDOPHILUS / LACTOBACILLUS BULGARICUS SCH GM: 100 MILLION CFU STRENGTH GRANULES at 20:35

## 2022-08-28 RX ADMIN — Medication SCH ML: at 08:09

## 2022-08-28 RX ADMIN — NYSTATIN SCH APPLIC: 100000 POWDER TOPICAL at 08:09

## 2022-08-28 RX ADMIN — Medication SCH UNITS: at 20:36

## 2022-08-29 LAB
ANION GAP SERPL CALC-SCNC: 14 MMOL/L (ref 10–20)
BASOPHILS # BLD AUTO: 0.1 THOU/UL (ref 0–0.2)
BASOPHILS NFR BLD AUTO: 1 % (ref 0–1)
BUN SERPL-MCNC: 10 MG/DL (ref 9.8–20.1)
CALCIUM SERPL-MCNC: 8.9 MG/DL (ref 7.8–10.44)
CHLORIDE SERPL-SCNC: 103 MMOL/L (ref 98–107)
CO2 SERPL-SCNC: 26 MMOL/L (ref 23–31)
CREAT CL PREDICTED SERPL C-G-VRATE: 114 ML/MIN (ref 70–130)
EOSINOPHIL # BLD AUTO: 0.1 THOU/UL (ref 0–0.7)
EOSINOPHIL NFR BLD AUTO: 1.3 % (ref 0–10)
GLUCOSE SERPL-MCNC: 112 MG/DL (ref 83–110)
HGB BLD-MCNC: 11.9 G/DL (ref 12–16)
LYMPHOCYTES # BLD AUTO: 1.1 THOU/UL (ref 1.2–3.4)
LYMPHOCYTES NFR BLD AUTO: 18.4 % (ref 21–51)
MCH RBC QN AUTO: 30.3 PG (ref 27–31)
MCV RBC AUTO: 97.6 FL (ref 78–98)
MONOCYTES # BLD AUTO: 0.5 THOU/UL (ref 0.11–0.59)
MONOCYTES NFR BLD AUTO: 9.1 % (ref 0–10)
NEUTROPHILS # BLD AUTO: 4.2 THOU/UL (ref 1.4–6.5)
NEUTROPHILS NFR BLD AUTO: 70.4 % (ref 42–75)
PLATELET # BLD AUTO: 312 THOU/UL (ref 130–400)
POTASSIUM SERPL-SCNC: 3.7 MMOL/L (ref 3.5–5.1)
RBC # BLD AUTO: 3.92 MILL/UL (ref 4.2–5.4)
SODIUM SERPL-SCNC: 139 MMOL/L (ref 136–145)
WBC # BLD AUTO: 6 THOU/UL (ref 4.8–10.8)

## 2022-08-29 RX ADMIN — DOCUSATE SODIUM 50 MG AND SENNOSIDES 8.6 MG PRN TAB: 8.6; 5 TABLET, FILM COATED ORAL at 08:10

## 2022-08-29 RX ADMIN — Medication SCH MG: at 08:10

## 2022-08-29 RX ADMIN — LACTOBACILLUS ACIDOPHILUS / LACTOBACILLUS BULGARICUS SCH GM: 100 MILLION CFU STRENGTH GRANULES at 20:54

## 2022-08-29 RX ADMIN — NYSTATIN SCH APPLIC: 100000 POWDER TOPICAL at 20:54

## 2022-08-29 RX ADMIN — Medication SCH UNITS: at 20:54

## 2022-08-29 RX ADMIN — NYSTATIN SCH APPLIC: 100000 POWDER TOPICAL at 08:15

## 2022-08-29 RX ADMIN — Medication SCH EACH: at 08:15

## 2022-08-29 RX ADMIN — Medication SCH ML: at 20:55

## 2022-08-29 RX ADMIN — Medication SCH ML: at 08:15

## 2022-08-30 RX ADMIN — Medication SCH MG: at 09:15

## 2022-08-30 RX ADMIN — LACTOBACILLUS ACIDOPHILUS / LACTOBACILLUS BULGARICUS SCH GM: 100 MILLION CFU STRENGTH GRANULES at 21:17

## 2022-08-30 RX ADMIN — Medication SCH: at 09:25

## 2022-08-30 RX ADMIN — NYSTATIN SCH APPLIC: 100000 POWDER TOPICAL at 21:17

## 2022-08-30 RX ADMIN — Medication SCH ML: at 09:16

## 2022-08-30 RX ADMIN — NYSTATIN SCH APPLIC: 100000 POWDER TOPICAL at 09:16

## 2022-08-30 RX ADMIN — Medication SCH UNITS: at 21:16

## 2022-08-30 RX ADMIN — Medication SCH ML: at 21:17

## 2022-08-30 RX ADMIN — Medication SCH EACH: at 09:16

## 2022-08-31 LAB
ANION GAP SERPL CALC-SCNC: 14 MMOL/L (ref 10–20)
BUN SERPL-MCNC: 15 MG/DL (ref 9.8–20.1)
CALCIUM SERPL-MCNC: 8.9 MG/DL (ref 7.8–10.44)
CHLORIDE SERPL-SCNC: 104 MMOL/L (ref 98–107)
CO2 SERPL-SCNC: 24 MMOL/L (ref 23–31)
CREAT CL PREDICTED SERPL C-G-VRATE: 114 ML/MIN (ref 70–130)
GLUCOSE SERPL-MCNC: 140 MG/DL (ref 83–110)
POTASSIUM SERPL-SCNC: 3.6 MMOL/L (ref 3.5–5.1)
SODIUM SERPL-SCNC: 138 MMOL/L (ref 136–145)

## 2022-08-31 RX ADMIN — Medication SCH ML: at 20:51

## 2022-08-31 RX ADMIN — LACTOBACILLUS ACIDOPHILUS / LACTOBACILLUS BULGARICUS SCH GM: 100 MILLION CFU STRENGTH GRANULES at 20:50

## 2022-08-31 RX ADMIN — NYSTATIN SCH APPLIC: 100000 POWDER TOPICAL at 08:44

## 2022-08-31 RX ADMIN — Medication SCH: at 08:43

## 2022-08-31 RX ADMIN — Medication SCH UNITS: at 20:51

## 2022-08-31 RX ADMIN — Medication SCH MG: at 08:44

## 2022-08-31 RX ADMIN — Medication SCH: at 08:44

## 2022-08-31 RX ADMIN — NYSTATIN SCH APPLIC: 100000 POWDER TOPICAL at 20:50

## 2022-09-01 LAB
ANION GAP SERPL CALC-SCNC: 12 MMOL/L (ref 10–20)
BASOPHILS # BLD AUTO: 0.1 THOU/UL (ref 0–0.2)
BASOPHILS NFR BLD AUTO: 1.1 % (ref 0–1)
BUN SERPL-MCNC: 17 MG/DL (ref 9.8–20.1)
CALCIUM SERPL-MCNC: 8.7 MG/DL (ref 7.8–10.44)
CHLORIDE SERPL-SCNC: 104 MMOL/L (ref 98–107)
CO2 SERPL-SCNC: 28 MMOL/L (ref 23–31)
CREAT CL PREDICTED SERPL C-G-VRATE: 111 ML/MIN (ref 70–130)
EOSINOPHIL # BLD AUTO: 0.1 THOU/UL (ref 0–0.7)
EOSINOPHIL NFR BLD AUTO: 1.7 % (ref 0–10)
GLUCOSE SERPL-MCNC: 95 MG/DL (ref 83–110)
HGB BLD-MCNC: 11 G/DL (ref 12–16)
LYMPHOCYTES # BLD AUTO: 1.7 THOU/UL (ref 1.2–3.4)
LYMPHOCYTES NFR BLD AUTO: 22.7 % (ref 21–51)
MCH RBC QN AUTO: 30.2 PG (ref 27–31)
MCV RBC AUTO: 96.5 FL (ref 78–98)
MONOCYTES # BLD AUTO: 0.7 THOU/UL (ref 0.11–0.59)
MONOCYTES NFR BLD AUTO: 8.7 % (ref 0–10)
NEUTROPHILS # BLD AUTO: 5 THOU/UL (ref 1.4–6.5)
NEUTROPHILS NFR BLD AUTO: 65.8 % (ref 42–75)
PLATELET # BLD AUTO: 285 THOU/UL (ref 130–400)
POTASSIUM SERPL-SCNC: 3.9 MMOL/L (ref 3.5–5.1)
RBC # BLD AUTO: 3.63 MILL/UL (ref 4.2–5.4)
SODIUM SERPL-SCNC: 140 MMOL/L (ref 136–145)
WBC # BLD AUTO: 7.6 THOU/UL (ref 4.8–10.8)

## 2022-09-01 RX ADMIN — NYSTATIN SCH APPLIC: 100000 POWDER TOPICAL at 08:14

## 2022-09-01 RX ADMIN — LACTOBACILLUS ACIDOPHILUS / LACTOBACILLUS BULGARICUS SCH GM: 100 MILLION CFU STRENGTH GRANULES at 20:36

## 2022-09-01 RX ADMIN — Medication SCH MG: at 08:13

## 2022-09-01 RX ADMIN — NYSTATIN SCH APPLIC: 100000 POWDER TOPICAL at 20:34

## 2022-09-01 RX ADMIN — Medication SCH: at 08:18

## 2022-09-01 RX ADMIN — Medication SCH UNITS: at 20:36

## 2022-09-01 RX ADMIN — Medication SCH ML: at 20:37

## 2022-09-01 RX ADMIN — Medication SCH: at 08:16

## 2022-09-02 RX ADMIN — NYSTATIN SCH APPLIC: 100000 POWDER TOPICAL at 08:23

## 2022-09-02 RX ADMIN — Medication SCH ML: at 21:04

## 2022-09-02 RX ADMIN — Medication SCH UNITS: at 21:04

## 2022-09-02 RX ADMIN — Medication SCH MG: at 08:23

## 2022-09-02 RX ADMIN — LACTOBACILLUS ACIDOPHILUS / LACTOBACILLUS BULGARICUS SCH GM: 100 MILLION CFU STRENGTH GRANULES at 21:04

## 2022-09-02 RX ADMIN — Medication SCH ML: at 08:25

## 2022-09-02 RX ADMIN — Medication SCH: at 08:26

## 2022-09-03 RX ADMIN — LACTOBACILLUS ACIDOPHILUS / LACTOBACILLUS BULGARICUS SCH GM: 100 MILLION CFU STRENGTH GRANULES at 21:09

## 2022-09-03 RX ADMIN — Medication SCH UNITS: at 21:09

## 2022-09-03 RX ADMIN — Medication SCH: at 08:34

## 2022-09-03 RX ADMIN — NYSTATIN SCH APPLIC: 100000 POWDER TOPICAL at 20:54

## 2022-09-03 RX ADMIN — NYSTATIN SCH: 100000 POWDER TOPICAL at 05:21

## 2022-09-03 RX ADMIN — Medication SCH ML: at 20:38

## 2022-09-03 RX ADMIN — NYSTATIN SCH APPLIC: 100000 POWDER TOPICAL at 08:34

## 2022-09-03 RX ADMIN — Medication SCH EACH: at 08:35

## 2022-09-03 RX ADMIN — Medication SCH MG: at 08:33

## 2022-09-04 LAB
ANION GAP SERPL CALC-SCNC: 13 MMOL/L (ref 10–20)
BASOPHILS # BLD AUTO: 0.1 THOU/UL (ref 0–0.2)
BASOPHILS NFR BLD AUTO: 0.9 % (ref 0–1)
BUN SERPL-MCNC: 16 MG/DL (ref 9.8–20.1)
CALCIUM SERPL-MCNC: 8.7 MG/DL (ref 7.8–10.44)
CHLORIDE SERPL-SCNC: 103 MMOL/L (ref 98–107)
CO2 SERPL-SCNC: 27 MMOL/L (ref 23–31)
CREAT CL PREDICTED SERPL C-G-VRATE: 118 ML/MIN (ref 70–130)
EOSINOPHIL # BLD AUTO: 0.1 THOU/UL (ref 0–0.7)
EOSINOPHIL NFR BLD AUTO: 1.1 % (ref 0–10)
GLUCOSE SERPL-MCNC: 109 MG/DL (ref 83–110)
HGB BLD-MCNC: 11.3 G/DL (ref 12–16)
LYMPHOCYTES # BLD AUTO: 1.2 THOU/UL (ref 1.2–3.4)
LYMPHOCYTES NFR BLD AUTO: 19.5 % (ref 21–51)
MCH RBC QN AUTO: 30.4 PG (ref 27–31)
MCV RBC AUTO: 96.4 FL (ref 78–98)
MONOCYTES # BLD AUTO: 0.5 THOU/UL (ref 0.11–0.59)
MONOCYTES NFR BLD AUTO: 8.8 % (ref 0–10)
NEUTROPHILS # BLD AUTO: 4.2 THOU/UL (ref 1.4–6.5)
NEUTROPHILS NFR BLD AUTO: 69.6 % (ref 42–75)
PLATELET # BLD AUTO: 264 THOU/UL (ref 130–400)
POTASSIUM SERPL-SCNC: 3.9 MMOL/L (ref 3.5–5.1)
RBC # BLD AUTO: 3.71 MILL/UL (ref 4.2–5.4)
SODIUM SERPL-SCNC: 139 MMOL/L (ref 136–145)
WBC # BLD AUTO: 6 THOU/UL (ref 4.8–10.8)

## 2022-09-04 RX ADMIN — Medication SCH UNITS: at 20:42

## 2022-09-04 RX ADMIN — NYSTATIN SCH APPLIC: 100000 POWDER TOPICAL at 20:42

## 2022-09-04 RX ADMIN — Medication SCH MG: at 09:31

## 2022-09-04 RX ADMIN — NYSTATIN SCH APPLIC: 100000 POWDER TOPICAL at 09:31

## 2022-09-04 RX ADMIN — LACTOBACILLUS ACIDOPHILUS / LACTOBACILLUS BULGARICUS SCH GM: 100 MILLION CFU STRENGTH GRANULES at 20:42

## 2022-09-04 RX ADMIN — Medication SCH ML: at 09:31

## 2022-09-04 RX ADMIN — Medication SCH ML: at 20:42

## 2022-09-04 RX ADMIN — Medication SCH EACH: at 09:33

## 2022-09-05 RX ADMIN — NYSTATIN SCH APPLIC: 100000 POWDER TOPICAL at 08:13

## 2022-09-05 RX ADMIN — NYSTATIN SCH APPLIC: 100000 POWDER TOPICAL at 21:08

## 2022-09-05 RX ADMIN — Medication SCH ML: at 21:09

## 2022-09-05 RX ADMIN — Medication SCH UNITS: at 21:09

## 2022-09-05 RX ADMIN — Medication SCH MG: at 08:13

## 2022-09-05 RX ADMIN — LACTOBACILLUS ACIDOPHILUS / LACTOBACILLUS BULGARICUS SCH GM: 100 MILLION CFU STRENGTH GRANULES at 21:09

## 2022-09-05 RX ADMIN — Medication SCH: at 08:13

## 2022-09-05 RX ADMIN — Medication SCH EACH: at 08:13

## 2022-09-06 VITALS — DIASTOLIC BLOOD PRESSURE: 60 MMHG | SYSTOLIC BLOOD PRESSURE: 124 MMHG | TEMPERATURE: 97.1 F

## 2022-09-06 RX ADMIN — Medication SCH: at 08:12

## 2022-09-06 RX ADMIN — NYSTATIN SCH APPLIC: 100000 POWDER TOPICAL at 08:11

## 2022-09-06 RX ADMIN — Medication SCH MG: at 08:11

## 2022-09-06 RX ADMIN — Medication SCH ML: at 08:12

## 2022-11-04 NOTE — PRG
DATE OF SERVICE:  01/09/2021



SUBJECTIVE:  The patient feels well, lying in bed.  No fever or chills.  She has

been eating well.  No nausea, vomiting or cough. 



OBJECTIVE:  VITAL SIGNS:  Temperature is 97.7, pulse 87, respirations 20, O2 sats

82% on room air, blood pressure 105/54. 

LUNGS:  Clear. 

CARDIAC:  Regular rhythm. 

ABDOMEN:  Obese and nontender. 

NEUROLOGIC:  Stable right hemiplegia.



ASSESSMENT:  

1. Resolving Proteus bacteremia, on IV meropenem until January 14.

2. 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

3. Stable hypertension.



PLAN:  Continue IV meropenem until January 14.  Continue to monitor for aspiration.

Continue stress ulcer and DVT prophylaxis.  Check labs in the morning. 







Job ID:  [id number] Reconstitution Date (Optional): 11/04/2022

## 2023-08-14 ENCOUNTER — HOSPITAL ENCOUNTER (OUTPATIENT)
Dept: HOSPITAL 92 - SDC | Age: 78
Discharge: HOME HEALTH | End: 2023-08-14
Attending: UROLOGY
Payer: MEDICARE

## 2023-08-14 VITALS — BODY MASS INDEX: 24.5 KG/M2

## 2023-08-14 DIAGNOSIS — N20.2: Primary | ICD-10-CM

## 2023-08-14 PROCEDURE — 74420 UROGRAPHY RTRGR +-KUB: CPT

## 2023-08-14 PROCEDURE — C2617 STENT, NON-COR, TEM W/O DEL: HCPCS

## 2023-08-14 PROCEDURE — C1747: HCPCS

## 2023-08-14 PROCEDURE — 0TC08ZZ EXTIRPATION OF MATTER FROM RIGHT KIDNEY, VIA NATURAL OR ARTIFICIAL OPENING ENDOSCOPIC: ICD-10-PCS | Performed by: UROLOGY

## 2024-04-17 NOTE — PRG
Continued stay Observation     Concurrent stay review; Secondary Review Determination         Under the authority of the Utilization Management Committee, the utilization review process indicated a secondary review on the above patient.  The review outcome is based on review of the medical records, discussions with staff, and applying clinical experience noted on the date of the review.          (x) Observation Status Appropriate - Concurrent stay review    RATIONALE FOR DETERMINATION     64 year old female who is s/p robot-assisted laparoscopic cholecystectomy with primary repair of umbilical hernia on 4/14/2024 with subsequent MRCP same day without signs of choledocholithiasis. Patient underwent laparoscopic cholecystectomy 4/14/2024.  Due to Uptrending LFTs and bilirubin  Patient underwent ERCP 4/16/2024 which showed choledocholithiasis.  Had complete removal of stone with balloon extraction and biliary sphincterotomy.  1 temporary stent was placed in the common bile duct.  LFTs has improved and Surgery clear pt for discharge today    Patient is clinically improving and there is no clear indication to change patient's status to inpatient. The severity of illness, intensity of service provided, expected LOS and risk for adverse outcome make the care appropriate for observation.    The information on this document is developed by the utilization review team in order for the business office to ensure compliance.  This only denotes the appropriateness of proper admission status and does not reflect the quality of care rendered.         The definitions of Inpatient Status and Observation Status used in making the determination above are those provided in the CMS Coverage Manual, Chapter 1 and Chapter 6, section 70.4.      Sincerely,   Ry Felipe MD    Utilization Review  Physician Advisor  [facility].      DATE OF SERVICE:  04/15/2019



SUBJECTIVE:  Yael Matute will awaken.  She is very weak.  Her daughter tells me that

she is in bed all the time and has been from the last year. 



OBJECTIVE:  VITAL SIGNS:  She is afebrile.  Heart rate 92, blood pressure 145/81,

and respiratory rate __________.  I have turned her ventilator down to 5 with

pressure support 5 of PEEP and a rate of 4. 

LUNGS:  Clear anteriorly. 

HEART:  Regular rhythm. 

ABDOMEN:  Soft.



LABORATORY DATA:  White count 12.3, hemoglobin 9.9, platelets 66,000.  Sodium 147,

potassium 3, chloride 119, bicarb 23, BUN 10, and creatinine 0.5.  Chest x-ray shows

bilateral effusions. 



The resident's plan to diurese her and I would agree with this.  Intake and output

was positive 568 today.  She is volume overloaded after volume resuscitation for her

sepsis. 



Hopefully, she will be a candidate for weaning, but the difficulty will be answering

the question of whether or not she will survive with her degree of deconditioning.

I have explained all this to the daughter. 



She does have a little bit of a hyperchloremic acidosis.  Hopefully, this does not

get worse with diuresis, but __________. 



I have recommended weaning her off propofol and placing her on Precedex if she needs

sedation. 



CRITICAL CARE TIME:  30 minutes.







Job ID:  [id number]

## 2024-11-02 ENCOUNTER — HOSPITAL ENCOUNTER (INPATIENT)
Dept: HOSPITAL 92 - ERS | Age: 79
LOS: 4 days | DRG: 853 | End: 2024-11-06
Attending: FAMILY MEDICINE | Admitting: FAMILY MEDICINE
Payer: MEDICARE

## 2024-11-02 VITALS — BODY MASS INDEX: 29 KG/M2 | BODY MASS INDEX: 25.6 KG/M2

## 2024-11-02 DIAGNOSIS — I25.2: ICD-10-CM

## 2024-11-02 DIAGNOSIS — Z96.0: ICD-10-CM

## 2024-11-02 DIAGNOSIS — I47.20: ICD-10-CM

## 2024-11-02 DIAGNOSIS — Z16.12: ICD-10-CM

## 2024-11-02 DIAGNOSIS — R65.21: ICD-10-CM

## 2024-11-02 DIAGNOSIS — D64.9: ICD-10-CM

## 2024-11-02 DIAGNOSIS — Z51.5: ICD-10-CM

## 2024-11-02 DIAGNOSIS — E87.20: ICD-10-CM

## 2024-11-02 DIAGNOSIS — J43.9: ICD-10-CM

## 2024-11-02 DIAGNOSIS — Z90.710: ICD-10-CM

## 2024-11-02 DIAGNOSIS — Z66: ICD-10-CM

## 2024-11-02 DIAGNOSIS — D69.6: ICD-10-CM

## 2024-11-02 DIAGNOSIS — Z95.818: ICD-10-CM

## 2024-11-02 DIAGNOSIS — E87.6: ICD-10-CM

## 2024-11-02 DIAGNOSIS — Z79.899: ICD-10-CM

## 2024-11-02 DIAGNOSIS — I69.352: ICD-10-CM

## 2024-11-02 DIAGNOSIS — G93.41: ICD-10-CM

## 2024-11-02 DIAGNOSIS — J96.01: ICD-10-CM

## 2024-11-02 DIAGNOSIS — Z98.890: ICD-10-CM

## 2024-11-02 DIAGNOSIS — F03.93: ICD-10-CM

## 2024-11-02 DIAGNOSIS — N13.6: ICD-10-CM

## 2024-11-02 DIAGNOSIS — K59.09: ICD-10-CM

## 2024-11-02 DIAGNOSIS — A41.89: ICD-10-CM

## 2024-11-02 DIAGNOSIS — Z74.01: ICD-10-CM

## 2024-11-02 DIAGNOSIS — K21.9: ICD-10-CM

## 2024-11-02 DIAGNOSIS — I25.10: ICD-10-CM

## 2024-11-02 DIAGNOSIS — A41.51: Primary | ICD-10-CM

## 2024-11-02 DIAGNOSIS — N17.9: ICD-10-CM

## 2024-11-02 DIAGNOSIS — E87.0: ICD-10-CM

## 2024-11-02 DIAGNOSIS — I21.3: ICD-10-CM

## 2024-11-02 DIAGNOSIS — E78.5: ICD-10-CM

## 2024-11-02 DIAGNOSIS — K72.00: ICD-10-CM

## 2024-11-02 DIAGNOSIS — Z53.8: ICD-10-CM

## 2024-11-02 DIAGNOSIS — I48.91: ICD-10-CM

## 2024-11-02 DIAGNOSIS — Z87.820: ICD-10-CM

## 2024-11-02 DIAGNOSIS — K52.89: ICD-10-CM

## 2024-11-02 DIAGNOSIS — I47.10: ICD-10-CM

## 2024-11-02 DIAGNOSIS — Z86.14: ICD-10-CM

## 2024-11-02 LAB
ALBUMIN SERPL BCG-MCNC: 2.5 G/DL (ref 3.4–4.8)
ALP SERPL-CCNC: 101 U/L (ref 40–110)
ALT SERPL W P-5'-P-CCNC: 15 U/L (ref 8–55)
ANALYZER IN CARDIO: (no result)
ANION GAP SERPL CALC-SCNC: 19 MMOL/L (ref 10–20)
AST SERPL-CCNC: 24 U/L (ref 5–34)
BASE EXCESS STD BLDA CALC-SCNC: -11.3 MEQ/L
BASOPHILS # BLD AUTO: 0.05 10X3/UL (ref 0–0.2)
BASOPHILS NFR BLD AUTO: 0.2 % (ref 0–1)
BILIRUB SERPL-MCNC: 0.7 MG/DL (ref 0.2–1.2)
BUN SERPL-MCNC: 16 MG/DL (ref 9.8–20.1)
CA-I BLDA-SCNC: 1.11 MMOL/L (ref 1.12–1.3)
CALCIUM SERPL-MCNC: 7.7 MG/DL (ref 7.8–10.44)
CAUTI INDICATIONS FOR CULTURE: (no result)
CHLORIDE SERPL-SCNC: 109 MMOL/L (ref 98–107)
CO2 SERPL-SCNC: 18 MMOL/L (ref 23–31)
CREAT CL PREDICTED SERPL C-G-VRATE: 0 ML/MIN (ref 70–130)
EOSINOPHIL # BLD AUTO: 0.1 10X3/UL (ref 0–0.7)
EOSINOPHIL NFR BLD AUTO: 0.4 % (ref 0–10)
GLOBULIN SER CALC-MCNC: 3.1 G/DL (ref 2.4–3.5)
GLUCOSE SERPL-MCNC: 151 MG/DL (ref 83–110)
HCO3 BLDA-SCNC: 13.7 MEQ/L (ref 22–28)
HCT VFR BLD CALC: 39.1 % (ref 36–47)
HCT VFR BLDA CALC: 36 % (ref 36–47)
HGB BLD-MCNC: 12 G/DL (ref 12–16)
HGB BLDA-MCNC: 12.4 G/DL (ref 12–16)
LEUKOCYTE ESTERASE UR QL STRIP.AUTO: 500 LEU/UL
LYMPHOCYTES NFR BLD AUTO: 1.5 % (ref 21–51)
MCH RBC QN AUTO: 31.7 PG (ref 27–31)
MCV RBC AUTO: 103.2 FL (ref 78–98)
MONOCYTES # BLD AUTO: 0.1 10X3/UL (ref 0.11–0.59)
MONOCYTES NFR BLD AUTO: 0.6 % (ref 0–10)
NEUTROPHILS # BLD AUTO: 21.1 10X3/UL (ref 1.4–6.5)
NEUTROPHILS NFR BLD AUTO: 95.3 % (ref 42–75)
PCO2 BLDA: 28.6 MMHG (ref 35–45)
PH BLDA: 7.3 [PH] (ref 7.35–7.45)
PLATELET # BLD AUTO: 242 10X3/UL (ref 130–400)
PO2 BLDA: 331.4 MMHG (ref 60–?)
POTASSIUM BLD-SCNC: 3.02 MMOL/L (ref 3.7–5.3)
POTASSIUM SERPL-SCNC: 3 MMOL/L (ref 3.5–5.1)
PROT UR STRIP.AUTO-MCNC: 50 MG/DL
RBC # BLD AUTO: 3.79 MILL/UL (ref 4.2–5.4)
SODIUM SERPL-SCNC: 143 MMOL/L (ref 136–145)
SP GR UR STRIP: 1.01 (ref 1–1.04)
TROPONIN I SERPL DL<=0.01 NG/ML-MCNC: 0.07 NG/ML (ref ?–0.03)
WBC # BLD AUTO: 22.1 10X3/UL (ref 4.8–10.8)

## 2024-11-02 PROCEDURE — B548ZZA ULTRASONOGRAPHY OF SUPERIOR VENA CAVA, GUIDANCE: ICD-10-PCS | Performed by: FAMILY MEDICINE

## 2024-11-02 PROCEDURE — 3E033XZ INTRODUCTION OF VASOPRESSOR INTO PERIPHERAL VEIN, PERCUTANEOUS APPROACH: ICD-10-PCS | Performed by: FAMILY MEDICINE

## 2024-11-02 PROCEDURE — 87086 URINE CULTURE/COLONY COUNT: CPT

## 2024-11-02 PROCEDURE — 50432 PLMT NEPHROSTOMY CATHETER: CPT

## 2024-11-02 PROCEDURE — P9045 ALBUMIN (HUMAN), 5%, 250 ML: HCPCS

## 2024-11-02 PROCEDURE — 30233J1 TRANSFUSION OF NONAUTOLOGOUS SERUM ALBUMIN INTO PERIPHERAL VEIN, PERCUTANEOUS APPROACH: ICD-10-PCS | Performed by: FAMILY MEDICINE

## 2024-11-02 PROCEDURE — P9047 ALBUMIN (HUMAN), 25%, 50ML: HCPCS

## 2024-11-02 PROCEDURE — 3E03329 INTRODUCTION OF OTHER ANTI-INFECTIVE INTO PERIPHERAL VEIN, PERCUTANEOUS APPROACH: ICD-10-PCS | Performed by: FAMILY MEDICINE

## 2024-11-02 PROCEDURE — 94760 N-INVAS EAR/PLS OXIMETRY 1: CPT

## 2024-11-02 PROCEDURE — 87077 CULTURE AEROBIC IDENTIFY: CPT

## 2024-11-02 PROCEDURE — 87186 SC STD MICRODIL/AGAR DIL: CPT

## 2024-11-02 PROCEDURE — 36415 COLL VENOUS BLD VENIPUNCTURE: CPT

## 2024-11-02 PROCEDURE — 83735 ASSAY OF MAGNESIUM: CPT

## 2024-11-02 PROCEDURE — 83880 ASSAY OF NATRIURETIC PEPTIDE: CPT

## 2024-11-02 PROCEDURE — C2617 STENT, NON-COR, TEM W/O DEL: HCPCS

## 2024-11-02 PROCEDURE — 93010 ELECTROCARDIOGRAM REPORT: CPT

## 2024-11-02 PROCEDURE — 81001 URINALYSIS AUTO W/SCOPE: CPT

## 2024-11-02 PROCEDURE — 83605 ASSAY OF LACTIC ACID: CPT

## 2024-11-02 PROCEDURE — 30243J1 TRANSFUSION OF NONAUTOLOGOUS SERUM ALBUMIN INTO CENTRAL VEIN, PERCUTANEOUS APPROACH: ICD-10-PCS | Performed by: UROLOGY

## 2024-11-02 PROCEDURE — 3E043XZ INTRODUCTION OF VASOPRESSOR INTO CENTRAL VEIN, PERCUTANEOUS APPROACH: ICD-10-PCS | Performed by: UROLOGY

## 2024-11-02 PROCEDURE — 84484 ASSAY OF TROPONIN QUANT: CPT

## 2024-11-02 PROCEDURE — 0T9B70Z DRAINAGE OF BLADDER WITH DRAINAGE DEVICE, VIA NATURAL OR ARTIFICIAL OPENING: ICD-10-PCS | Performed by: FAMILY MEDICINE

## 2024-11-02 PROCEDURE — 87149 DNA/RNA DIRECT PROBE: CPT

## 2024-11-02 PROCEDURE — 3E04329 INTRODUCTION OF OTHER ANTI-INFECTIVE INTO CENTRAL VEIN, PERCUTANEOUS APPROACH: ICD-10-PCS | Performed by: UROLOGY

## 2024-11-02 PROCEDURE — 80053 COMPREHEN METABOLIC PANEL: CPT

## 2024-11-02 PROCEDURE — 74420 UROGRAPHY RTRGR +-KUB: CPT

## 2024-11-02 PROCEDURE — 36416 COLLJ CAPILLARY BLOOD SPEC: CPT

## 2024-11-02 PROCEDURE — 4A133R1 MONITORING OF ARTERIAL SATURATION, PERIPHERAL, PERCUTANEOUS APPROACH: ICD-10-PCS | Performed by: FAMILY MEDICINE

## 2024-11-02 PROCEDURE — 5A2204Z RESTORATION OF CARDIAC RHYTHM, SINGLE: ICD-10-PCS | Performed by: FAMILY MEDICINE

## 2024-11-02 PROCEDURE — 85025 COMPLETE CBC W/AUTO DIFF WBC: CPT

## 2024-11-02 PROCEDURE — 5A1945Z RESPIRATORY VENTILATION, 24-96 CONSECUTIVE HOURS: ICD-10-PCS | Performed by: FAMILY MEDICINE

## 2024-11-02 PROCEDURE — 0BH17EZ INSERTION OF ENDOTRACHEAL AIRWAY INTO TRACHEA, VIA NATURAL OR ARTIFICIAL OPENING: ICD-10-PCS | Performed by: FAMILY MEDICINE

## 2024-11-02 PROCEDURE — 02HV33Z INSERTION OF INFUSION DEVICE INTO SUPERIOR VENA CAVA, PERCUTANEOUS APPROACH: ICD-10-PCS | Performed by: FAMILY MEDICINE

## 2024-11-02 PROCEDURE — 87040 BLOOD CULTURE FOR BACTERIA: CPT

## 2024-11-02 PROCEDURE — C1729 CATH, DRAINAGE: HCPCS

## 2024-11-02 PROCEDURE — 87428 SARSCOV & INF VIR A&B AG IA: CPT

## 2024-11-02 PROCEDURE — 74177 CT ABD & PELVIS W/CONTRAST: CPT

## 2024-11-02 PROCEDURE — C1769 GUIDE WIRE: HCPCS

## 2024-11-02 PROCEDURE — 76705 ECHO EXAM OF ABDOMEN: CPT

## 2024-11-02 PROCEDURE — 94002 VENT MGMT INPAT INIT DAY: CPT

## 2024-11-02 PROCEDURE — 80202 ASSAY OF VANCOMYCIN: CPT

## 2024-11-02 PROCEDURE — 82805 BLOOD GASES W/O2 SATURATION: CPT

## 2024-11-02 PROCEDURE — 0D9670Z DRAINAGE OF STOMACH WITH DRAINAGE DEVICE, VIA NATURAL OR ARTIFICIAL OPENING: ICD-10-PCS | Performed by: FAMILY MEDICINE

## 2024-11-02 PROCEDURE — 71045 X-RAY EXAM CHEST 1 VIEW: CPT

## 2024-11-02 PROCEDURE — 84145 PROCALCITONIN (PCT): CPT

## 2024-11-02 PROCEDURE — 93005 ELECTROCARDIOGRAM TRACING: CPT

## 2024-11-02 PROCEDURE — 36600 WITHDRAWAL OF ARTERIAL BLOOD: CPT

## 2024-11-02 PROCEDURE — 94003 VENT MGMT INPAT SUBQ DAY: CPT

## 2024-11-03 LAB
ALBUMIN SERPL BCG-MCNC: 2.3 G/DL (ref 3.4–4.8)
ALBUMIN SERPL BCG-MCNC: 2.5 G/DL (ref 3.4–4.8)
ALBUMIN SERPL BCG-MCNC: 2.6 G/DL (ref 3.4–4.8)
ALP SERPL-CCNC: 71 U/L (ref 40–110)
ALP SERPL-CCNC: 79 U/L (ref 40–110)
ALP SERPL-CCNC: 87 U/L (ref 40–110)
ALT SERPL W P-5'-P-CCNC: 22 U/L (ref 8–55)
ALT SERPL W P-5'-P-CCNC: 37 U/L (ref 8–55)
ALT SERPL W P-5'-P-CCNC: 37 U/L (ref 8–55)
ANALYZER IN CARDIO: (no result)
ANION GAP SERPL CALC-SCNC: 18 MMOL/L (ref 10–20)
ANION GAP SERPL CALC-SCNC: 18 MMOL/L (ref 10–20)
ANION GAP SERPL CALC-SCNC: 19 MMOL/L (ref 10–20)
ANION GAP SERPL CALC-SCNC: 22 MMOL/L (ref 10–20)
AST SERPL-CCNC: 33 U/L (ref 5–34)
AST SERPL-CCNC: 58 U/L (ref 5–34)
AST SERPL-CCNC: 58 U/L (ref 5–34)
BASE EXCESS STD BLDA CALC-SCNC: -1.6 MEQ/L
BILIRUB SERPL-MCNC: 1 MG/DL (ref 0.2–1.2)
BILIRUB SERPL-MCNC: 1.2 MG/DL (ref 0.2–1.2)
BILIRUB SERPL-MCNC: 1.5 MG/DL (ref 0.2–1.2)
BUN SERPL-MCNC: 15 MG/DL (ref 9.8–20.1)
BUN SERPL-MCNC: 16 MG/DL (ref 9.8–20.1)
BUN SERPL-MCNC: 17 MG/DL (ref 9.8–20.1)
BUN SERPL-MCNC: 17 MG/DL (ref 9.8–20.1)
BURR CELLS BLD QL SMEAR: (no result) HPF (ref 0–1)
BURR CELLS BLD QL SMEAR: (no result) HPF (ref 0–1)
CA-I BLDA-SCNC: 1.07 MMOL/L (ref 1.12–1.3)
CALCIUM SERPL-MCNC: 7.1 MG/DL (ref 7.8–10.44)
CALCIUM SERPL-MCNC: 7.2 MG/DL (ref 7.8–10.44)
CALCIUM SERPL-MCNC: 7.6 MG/DL (ref 7.8–10.44)
CALCIUM SERPL-MCNC: 7.7 MG/DL (ref 7.8–10.44)
CHLORIDE SERPL-SCNC: 113 MMOL/L (ref 98–107)
CHLORIDE SERPL-SCNC: 114 MMOL/L (ref 98–107)
CHLORIDE SERPL-SCNC: 114 MMOL/L (ref 98–107)
CHLORIDE SERPL-SCNC: 115 MMOL/L (ref 98–107)
CO2 SERPL-SCNC: 15 MMOL/L (ref 23–31)
CO2 SERPL-SCNC: 18 MMOL/L (ref 23–31)
CO2 SERPL-SCNC: 19 MMOL/L (ref 23–31)
CO2 SERPL-SCNC: 20 MMOL/L (ref 23–31)
CREAT CL PREDICTED SERPL C-G-VRATE: 68 ML/MIN (ref 70–130)
CREAT CL PREDICTED SERPL C-G-VRATE: 69 ML/MIN (ref 70–130)
CREAT CL PREDICTED SERPL C-G-VRATE: 71 ML/MIN (ref 70–130)
CREAT CL PREDICTED SERPL C-G-VRATE: 72 ML/MIN (ref 70–130)
GLOBULIN SER CALC-MCNC: 1.8 G/DL (ref 2.4–3.5)
GLOBULIN SER CALC-MCNC: 1.9 G/DL (ref 2.4–3.5)
GLOBULIN SER CALC-MCNC: 2.1 G/DL (ref 2.4–3.5)
GLUCOSE SERPL-MCNC: 166 MG/DL (ref 83–110)
GLUCOSE SERPL-MCNC: 172 MG/DL (ref 83–110)
GLUCOSE SERPL-MCNC: 183 MG/DL (ref 83–110)
GLUCOSE SERPL-MCNC: 246 MG/DL (ref 83–110)
HCO3 BLDA-SCNC: 20.6 MEQ/L (ref 22–28)
HCT VFR BLD CALC: 30.6 % (ref 36–47)
HCT VFR BLD CALC: 31.5 % (ref 36–47)
HCT VFR BLDA CALC: 31 % (ref 36–47)
HGB BLD-MCNC: 10 G/DL (ref 12–16)
HGB BLD-MCNC: 9.8 G/DL (ref 12–16)
HGB BLDA-MCNC: 10.5 G/DL (ref 12–16)
MAGNESIUM SERPL-MCNC: 2.1 MG/DL (ref 1.6–2.6)
MCH RBC QN AUTO: 31.8 PG (ref 27–31)
MCH RBC QN AUTO: 32 PG (ref 27–31)
MCV RBC AUTO: 100 FL (ref 78–98)
MCV RBC AUTO: 100.3 FL (ref 78–98)
O2 A-A PPRESDIFF RESPIRATORY: 162.4 MMHG (ref 0–20)
PCO2 BLDA: 27.2 MMHG (ref 35–45)
PH BLDA: 7.5 [PH] (ref 7.35–7.45)
PLATELET # BLD AUTO: 165 10X3/UL (ref 130–400)
PLATELET # BLD AUTO: 214 10X3/UL (ref 130–400)
PO2 BLDA: 88.8 MMHG (ref 60–?)
POIKILOCYTOSIS BLD QL SMEAR: (no result) HPF (ref 0–5)
POLYCHROMASIA BLD QL SMEAR: (no result) HPF (ref 0–2)
POLYCHROMASIA BLD QL SMEAR: (no result) HPF (ref 0–2)
POTASSIUM BLD-SCNC: 2.96 MMOL/L (ref 3.7–5.3)
POTASSIUM SERPL-SCNC: 2.7 MMOL/L (ref 3.5–5.1)
POTASSIUM SERPL-SCNC: 3.2 MMOL/L (ref 3.5–5.1)
POTASSIUM SERPL-SCNC: 3.3 MMOL/L (ref 3.5–5.1)
POTASSIUM SERPL-SCNC: 3.5 MMOL/L (ref 3.5–5.1)
RBC # BLD AUTO: 3.06 MILL/UL (ref 4.2–5.4)
RBC # BLD AUTO: 3.14 MILL/UL (ref 4.2–5.4)
SMUDGE CELLS BLD QL SMEAR: 0.9 %
SMUDGE CELLS BLD QL SMEAR: 0.9 %
SODIUM SERPL-SCNC: 145 MMOL/L (ref 136–145)
SODIUM SERPL-SCNC: 147 MMOL/L (ref 136–145)
SODIUM SERPL-SCNC: 148 MMOL/L (ref 136–145)
SODIUM SERPL-SCNC: 152 MMOL/L (ref 136–145)
SPECIMEN DRAWN FROM PATIENT: (no result)
WBC # BLD AUTO: 22.5 10X3/UL (ref 4.8–10.8)
WBC # BLD AUTO: 37.4 10X3/UL (ref 4.8–10.8)

## 2024-11-03 PROCEDURE — 4A133J1 MONITORING OF ARTERIAL PULSE, PERIPHERAL, PERCUTANEOUS APPROACH: ICD-10-PCS | Performed by: UROLOGY

## 2024-11-03 PROCEDURE — 0T9430Z DRAINAGE OF LEFT KIDNEY PELVIS WITH DRAINAGE DEVICE, PERCUTANEOUS APPROACH: ICD-10-PCS | Performed by: RADIOLOGY

## 2024-11-03 PROCEDURE — BT1FZZZ FLUOROSCOPY OF LEFT KIDNEY, URETER AND BLADDER: ICD-10-PCS | Performed by: UROLOGY

## 2024-11-03 PROCEDURE — 03HY32Z INSERTION OF MONITORING DEVICE INTO UPPER ARTERY, PERCUTANEOUS APPROACH: ICD-10-PCS | Performed by: UROLOGY

## 2024-11-03 PROCEDURE — 4A133B1 MONITORING OF ARTERIAL PRESSURE, PERIPHERAL, PERCUTANEOUS APPROACH: ICD-10-PCS | Performed by: UROLOGY

## 2024-11-03 PROCEDURE — 0T768DZ DILATION OF RIGHT URETER WITH INTRALUMINAL DEVICE, VIA NATURAL OR ARTIFICIAL OPENING ENDOSCOPIC: ICD-10-PCS | Performed by: UROLOGY

## 2024-11-03 RX ADMIN — ESMOLOL HYDROCHLORIDE SCH MLS: 10 INJECTION INTRAVENOUS at 00:47

## 2024-11-03 RX ADMIN — HYDROCORTISONE SODIUM SUCCINATE SCH MG: 100 INJECTION, POWDER, FOR SOLUTION INTRAMUSCULAR; INTRAVENOUS at 06:20

## 2024-11-03 RX ADMIN — DOCUSATE SODIUM 50 MG AND SENNOSIDES 8.6 MG SCH TAB: 8.6; 5 TABLET, FILM COATED ORAL at 08:25

## 2024-11-03 RX ADMIN — ALBUMIN HUMAN SCH GM: 250 SOLUTION INTRAVENOUS at 01:18

## 2024-11-03 RX ADMIN — HYDROCORTISONE SODIUM SUCCINATE SCH MG: 100 INJECTION, POWDER, FOR SOLUTION INTRAMUSCULAR; INTRAVENOUS at 01:18

## 2024-11-03 RX ADMIN — NOREPINEPHRINE BITARTRATE PRN MLS: 8 INJECTION, SOLUTION INTRAVENOUS at 00:47

## 2024-11-03 RX ADMIN — Medication SCH MLS: at 11:35

## 2024-11-03 RX ADMIN — CALCIUM GLUCONATE SCH MLS: 20 INJECTION, SOLUTION INTRAVENOUS at 06:20

## 2024-11-03 RX ADMIN — METOROPROLOL TARTRATE ONE MG: 5 INJECTION, SOLUTION INTRAVENOUS at 02:38

## 2024-11-03 RX ADMIN — SODIUM BICARBONATE SCH MEQ: 84 INJECTION, SOLUTION INTRAVENOUS at 06:20

## 2024-11-03 RX ADMIN — ENOXAPARIN SODIUM SCH MG: 100 INJECTION SUBCUTANEOUS at 11:41

## 2024-11-03 RX ADMIN — Medication SCH MLS: at 01:17

## 2024-11-03 RX ADMIN — VANCOMYCIN HYDROCHLORIDE SCH MLS: 750 INJECTION, POWDER, LYOPHILIZED, FOR SOLUTION INTRAVENOUS at 03:45

## 2024-11-03 RX ADMIN — METOROPROLOL TARTRATE SCH MG: 5 INJECTION, SOLUTION INTRAVENOUS at 02:38

## 2024-11-03 RX ADMIN — SODIUM BICARBONATE SCH MEQ: 84 INJECTION, SOLUTION INTRAVENOUS at 01:18

## 2024-11-03 RX ADMIN — VANCOMYCIN HYDROCHLORIDE SCH: 10 INJECTION, POWDER, LYOPHILIZED, FOR SOLUTION INTRAVENOUS at 02:38

## 2024-11-03 RX ADMIN — POTASSIUM CHLORIDE SCH MLS: 29.8 INJECTION, SOLUTION INTRAVENOUS at 01:17

## 2024-11-03 RX ADMIN — VASOPRESSIN IN 0.9% SODIUM CHLORIDE PRN MLS: 40 INJECTION INTRAVENOUS at 10:12

## 2024-11-04 LAB
ALBUMIN SERPL BCG-MCNC: 2 G/DL (ref 3.4–4.8)
ALP SERPL-CCNC: 94 U/L (ref 40–110)
ALT SERPL W P-5'-P-CCNC: 41 U/L (ref 8–55)
ANALYZER IN CARDIO: (no result)
ANION GAP SERPL CALC-SCNC: 14 MMOL/L (ref 10–20)
AST SERPL-CCNC: 48 U/L (ref 5–34)
BASE EXCESS STD BLDA CALC-SCNC: -2.3 MEQ/L
BILIRUB SERPL-MCNC: 0.6 MG/DL (ref 0.2–1.2)
BUN SERPL-MCNC: 18 MG/DL (ref 9.8–20.1)
BURR CELLS BLD QL SMEAR: (no result) HPF (ref 0–1)
CA-I BLDA-SCNC: 1.04 MMOL/L (ref 1.12–1.3)
CALCIUM SERPL-MCNC: 7 MG/DL (ref 7.8–10.44)
CHLORIDE SERPL-SCNC: 111 MMOL/L (ref 98–107)
CO2 SERPL-SCNC: 21 MMOL/L (ref 23–31)
CREAT CL PREDICTED SERPL C-G-VRATE: 86 ML/MIN (ref 70–130)
GLOBULIN SER CALC-MCNC: 2.1 G/DL (ref 2.4–3.5)
GLUCOSE SERPL-MCNC: 165 MG/DL (ref 83–110)
HCO3 BLDA-SCNC: 22.8 MEQ/L (ref 22–28)
HCT VFR BLD CALC: 27.6 % (ref 36–47)
HCT VFR BLDA CALC: 29 % (ref 36–47)
HGB BLD-MCNC: 8.8 G/DL (ref 12–16)
HGB BLDA-MCNC: 10 G/DL (ref 12–16)
MACROCYTES BLD QL SMEAR: (no result) HPF (ref 0–5)
MCH RBC QN AUTO: 31.5 PG (ref 27–31)
MCV RBC AUTO: 98.9 FL (ref 78–98)
O2 A-A PPRESDIFF RESPIRATORY: 172.07 MMHG (ref 0–20)
PCO2 BLDA: 41.3 MMHG (ref 35–45)
PH BLDA: 7.37 [PH] (ref 7.35–7.45)
PLATELET # BLD AUTO: 83 10X3/UL (ref 130–400)
PO2 BLDA: 61.5 MMHG (ref 60–?)
POIKILOCYTOSIS BLD QL SMEAR: (no result) HPF (ref 0–5)
POLYCHROMASIA BLD QL SMEAR: (no result) HPF (ref 0–2)
POTASSIUM BLD-SCNC: 3.62 MMOL/L (ref 3.7–5.3)
POTASSIUM SERPL-SCNC: 3.6 MMOL/L (ref 3.5–5.1)
RBC # BLD AUTO: 2.79 MILL/UL (ref 4.2–5.4)
SMUDGE CELLS BLD QL SMEAR: 4.6 %
SODIUM SERPL-SCNC: 142 MMOL/L (ref 136–145)
TROPONIN I SERPL DL<=0.01 NG/ML-MCNC: 1.21 NG/ML (ref ?–0.03)
VANCOMYCIN SERPL-MCNC: 18.6 UG/ML
WBC # BLD AUTO: 39.3 10X3/UL (ref 4.8–10.8)

## 2024-11-04 RX ADMIN — VANCOMYCIN HYDROCHLORIDE SCH MLS: 1 INJECTION, SOLUTION INTRAVENOUS at 13:04

## 2024-11-05 LAB
ALBUMIN SERPL BCG-MCNC: 2.1 G/DL (ref 3.4–4.8)
ALP SERPL-CCNC: 146 U/L (ref 40–110)
ALT SERPL W P-5'-P-CCNC: 39 U/L (ref 8–55)
ANION GAP SERPL CALC-SCNC: 11 MMOL/L (ref 10–20)
AST SERPL-CCNC: 36 U/L (ref 5–34)
BILIRUB SERPL-MCNC: 0.5 MG/DL (ref 0.2–1.2)
BUN SERPL-MCNC: 16 MG/DL (ref 9.8–20.1)
BURR CELLS BLD QL SMEAR: (no result) HPF (ref 0–1)
CALCIUM SERPL-MCNC: 7.3 MG/DL (ref 7.8–10.44)
CHLORIDE SERPL-SCNC: 104 MMOL/L (ref 98–107)
CO2 SERPL-SCNC: 24 MMOL/L (ref 23–31)
CREAT CL PREDICTED SERPL C-G-VRATE: 107 ML/MIN (ref 70–130)
GLOBULIN SER CALC-MCNC: 2.5 G/DL (ref 2.4–3.5)
GLUCOSE SERPL-MCNC: 134 MG/DL (ref 83–110)
HCT VFR BLD CALC: 29.6 % (ref 36–47)
HGB BLD-MCNC: 9.8 G/DL (ref 12–16)
MCH RBC QN AUTO: 31.8 PG (ref 27–31)
MCV RBC AUTO: 96.1 FL (ref 78–98)
PLATELET # BLD AUTO: 76 10X3/UL (ref 130–400)
POIKILOCYTOSIS BLD QL SMEAR: (no result) HPF (ref 0–5)
POLYCHROMASIA BLD QL SMEAR: (no result) HPF (ref 0–2)
POTASSIUM SERPL-SCNC: 3.3 MMOL/L (ref 3.5–5.1)
RBC # BLD AUTO: 3.08 MILL/UL (ref 4.2–5.4)
SMUDGE CELLS BLD QL SMEAR: 3.9 %
SODIUM SERPL-SCNC: 136 MMOL/L (ref 136–145)
VANCOMYCIN SERPL-MCNC: 30.8 UG/ML
WBC # BLD AUTO: 57 10X3/UL (ref 4.8–10.8)

## 2024-11-05 RX ADMIN — Medication SCH MLS: at 23:22

## 2024-11-05 RX ADMIN — ALBUMIN HUMAN SCH GM: 250 SOLUTION INTRAVENOUS at 12:48

## 2024-11-06 VITALS — TEMPERATURE: 97.7 F

## 2024-11-06 VITALS — DIASTOLIC BLOOD PRESSURE: 75 MMHG | SYSTOLIC BLOOD PRESSURE: 86 MMHG

## 2024-11-06 LAB
ALBUMIN SERPL BCG-MCNC: 3.2 G/DL (ref 3.4–4.8)
ALP SERPL-CCNC: 174 U/L (ref 40–110)
ALT SERPL W P-5'-P-CCNC: 511 U/L (ref 8–55)
ANION GAP SERPL CALC-SCNC: 17 MMOL/L (ref 10–20)
AST SERPL-CCNC: 683 U/L (ref 5–34)
BILIRUB SERPL-MCNC: 1.7 MG/DL (ref 0.2–1.2)
BUN SERPL-MCNC: 18 MG/DL (ref 9.8–20.1)
BURR CELLS BLD QL SMEAR: (no result) HPF (ref 0–1)
CALCIUM SERPL-MCNC: 7.3 MG/DL (ref 7.8–10.44)
CHLORIDE SERPL-SCNC: 104 MMOL/L (ref 98–107)
CO2 SERPL-SCNC: 18 MMOL/L (ref 23–31)
CREAT CL PREDICTED SERPL C-G-VRATE: 96 ML/MIN (ref 70–130)
GLOBULIN SER CALC-MCNC: 1.9 G/DL (ref 2.4–3.5)
GLUCOSE SERPL-MCNC: 222 MG/DL (ref 83–110)
HCT VFR BLD CALC: 31.4 % (ref 36–47)
HGB BLD-MCNC: 10.1 G/DL (ref 12–16)
MACROCYTES BLD QL SMEAR: (no result) HPF (ref 0–5)
MCH RBC QN AUTO: 31.5 PG (ref 27–31)
MCV RBC AUTO: 97.8 FL (ref 78–98)
PLATELET # BLD AUTO: 35 10X3/UL (ref 130–400)
POIKILOCYTOSIS BLD QL SMEAR: (no result) HPF (ref 0–5)
POLYCHROMASIA BLD QL SMEAR: (no result) HPF (ref 0–2)
POTASSIUM SERPL-SCNC: 3.4 MMOL/L (ref 3.5–5.1)
RBC # BLD AUTO: 3.21 MILL/UL (ref 4.2–5.4)
SODIUM SERPL-SCNC: 136 MMOL/L (ref 136–145)
VANCOMYCIN SERPL-MCNC: 36.8 UG/ML
WBC # BLD AUTO: 42.2 10X3/UL (ref 4.8–10.8)

## 2024-11-06 RX ADMIN — Medication SCH MLS: at 11:30

## 2024-11-06 RX ADMIN — VANCOMYCIN HYDROCHLORIDE SCH MLS: 10 INJECTION, POWDER, LYOPHILIZED, FOR SOLUTION INTRAVENOUS at 00:16

## 2024-11-06 RX ADMIN — ALBUMIN HUMAN SCH GM: 250 SOLUTION INTRAVENOUS at 11:28

## 2024-11-06 RX ADMIN — ALBUMIN HUMAN SCH GM: 250 SOLUTION INTRAVENOUS at 11:07
